# Patient Record
Sex: MALE | Race: WHITE | Employment: OTHER | URBAN - METROPOLITAN AREA
[De-identification: names, ages, dates, MRNs, and addresses within clinical notes are randomized per-mention and may not be internally consistent; named-entity substitution may affect disease eponyms.]

---

## 2017-05-16 ENCOUNTER — OFFICE VISIT (OUTPATIENT)
Dept: FAMILY MEDICINE | Facility: CLINIC | Age: 72
End: 2017-05-16
Payer: COMMERCIAL

## 2017-05-16 ENCOUNTER — RADIANT APPOINTMENT (OUTPATIENT)
Dept: GENERAL RADIOLOGY | Facility: CLINIC | Age: 72
End: 2017-05-16
Attending: INTERNAL MEDICINE
Payer: COMMERCIAL

## 2017-05-16 VITALS
DIASTOLIC BLOOD PRESSURE: 74 MMHG | OXYGEN SATURATION: 97 % | TEMPERATURE: 98.9 F | SYSTOLIC BLOOD PRESSURE: 122 MMHG | WEIGHT: 179 LBS | BODY MASS INDEX: 26.51 KG/M2 | HEIGHT: 69 IN | HEART RATE: 103 BPM

## 2017-05-16 DIAGNOSIS — J98.4 CALCIFIED GRANULOMA OF LUNG: ICD-10-CM

## 2017-05-16 DIAGNOSIS — Z23 NEED FOR PROPHYLACTIC VACCINATION AGAINST STREPTOCOCCUS PNEUMONIAE (PNEUMOCOCCUS): ICD-10-CM

## 2017-05-16 DIAGNOSIS — J32.0 LEFT MAXILLARY SINUSITIS: ICD-10-CM

## 2017-05-16 DIAGNOSIS — E78.5 HYPERLIPIDEMIA LDL GOAL <100: ICD-10-CM

## 2017-05-16 DIAGNOSIS — R91.8 PULMONARY NODULES: ICD-10-CM

## 2017-05-16 DIAGNOSIS — R05.3 PERSISTENT COUGH: ICD-10-CM

## 2017-05-16 DIAGNOSIS — J01.10 ACUTE FRONTAL SINUSITIS, UNSPECIFIED: Primary | ICD-10-CM

## 2017-05-16 PROBLEM — G47.33 OBSTRUCTIVE SLEEP APNEA: Status: ACTIVE | Noted: 2017-05-16

## 2017-05-16 PROBLEM — E11.9 TYPE 2 DIABETES MELLITUS WITHOUT COMPLICATION (H): Status: ACTIVE | Noted: 2017-05-16

## 2017-05-16 PROBLEM — E66.3 OVERWEIGHT (BMI 25.0-29.9): Status: ACTIVE | Noted: 2017-05-16

## 2017-05-16 LAB
ALBUMIN SERPL-MCNC: 3.9 G/DL (ref 3.4–5)
ALP SERPL-CCNC: 97 U/L (ref 40–150)
ALT SERPL W P-5'-P-CCNC: 33 U/L (ref 0–70)
ANION GAP SERPL CALCULATED.3IONS-SCNC: 12 MMOL/L (ref 3–14)
AST SERPL W P-5'-P-CCNC: 25 U/L (ref 0–45)
BILIRUB SERPL-MCNC: 0.6 MG/DL (ref 0.2–1.3)
BUN SERPL-MCNC: 17 MG/DL (ref 7–30)
CALCIUM SERPL-MCNC: 9.3 MG/DL (ref 8.5–10.1)
CHLORIDE SERPL-SCNC: 97 MMOL/L (ref 94–109)
CO2 SERPL-SCNC: 26 MMOL/L (ref 20–32)
CREAT SERPL-MCNC: 0.95 MG/DL (ref 0.66–1.25)
CREAT UR-MCNC: 162 MG/DL
DIFFERENTIAL METHOD BLD: NORMAL
ERYTHROCYTE [DISTWIDTH] IN BLOOD BY AUTOMATED COUNT: 12.3 % (ref 10–15)
GFR SERPL CREATININE-BSD FRML MDRD: 79 ML/MIN/1.7M2
GLUCOSE SERPL-MCNC: 325 MG/DL (ref 70–99)
HBA1C MFR BLD: 13.4 % (ref 4.3–6)
HCT VFR BLD AUTO: 48.2 % (ref 40–53)
HGB BLD-MCNC: 16.6 G/DL (ref 13.3–17.7)
LYMPHOCYTES # BLD AUTO: 1.5 10E9/L (ref 0.8–5.3)
LYMPHOCYTES NFR BLD AUTO: 16 %
MCH RBC QN AUTO: 31.3 PG (ref 26.5–33)
MCHC RBC AUTO-ENTMCNC: 34.4 G/DL (ref 31.5–36.5)
MCV RBC AUTO: 91 FL (ref 78–100)
MICROALBUMIN UR-MCNC: 1730 MG/L
MICROALBUMIN/CREAT UR: 1067.9 MG/G CR (ref 0–17)
MONOCYTES # BLD AUTO: 1.2 10E9/L (ref 0–1.3)
MONOCYTES NFR BLD AUTO: 13 %
NEUTROPHILS # BLD AUTO: 6.9 10E9/L (ref 1.6–8.3)
NEUTROPHILS NFR BLD AUTO: 71 %
PLATELET # BLD AUTO: 178 10E9/L (ref 150–450)
POTASSIUM SERPL-SCNC: 4.2 MMOL/L (ref 3.4–5.3)
PROT SERPL-MCNC: 8 G/DL (ref 6.8–8.8)
RBC # BLD AUTO: 5.31 10E12/L (ref 4.4–5.9)
SODIUM SERPL-SCNC: 135 MMOL/L (ref 133–144)
VARIANT LYMPHS BLD QL SMEAR: PRESENT
WBC # BLD AUTO: 9.6 10E9/L (ref 4–11)

## 2017-05-16 PROCEDURE — G0009 ADMIN PNEUMOCOCCAL VACCINE: HCPCS | Performed by: INTERNAL MEDICINE

## 2017-05-16 PROCEDURE — 82043 UR ALBUMIN QUANTITATIVE: CPT | Performed by: INTERNAL MEDICINE

## 2017-05-16 PROCEDURE — 84681 ASSAY OF C-PEPTIDE: CPT | Performed by: INTERNAL MEDICINE

## 2017-05-16 PROCEDURE — 85025 COMPLETE CBC W/AUTO DIFF WBC: CPT | Performed by: INTERNAL MEDICINE

## 2017-05-16 PROCEDURE — 70220 X-RAY EXAM OF SINUSES: CPT

## 2017-05-16 PROCEDURE — 80061 LIPID PANEL: CPT | Performed by: INTERNAL MEDICINE

## 2017-05-16 PROCEDURE — 90670 PCV13 VACCINE IM: CPT | Performed by: INTERNAL MEDICINE

## 2017-05-16 PROCEDURE — 83036 HEMOGLOBIN GLYCOSYLATED A1C: CPT | Performed by: INTERNAL MEDICINE

## 2017-05-16 PROCEDURE — 71020 XR CHEST 2 VW: CPT

## 2017-05-16 PROCEDURE — 86480 TB TEST CELL IMMUN MEASURE: CPT | Performed by: INTERNAL MEDICINE

## 2017-05-16 PROCEDURE — 99204 OFFICE O/P NEW MOD 45 MIN: CPT | Mod: 25 | Performed by: INTERNAL MEDICINE

## 2017-05-16 PROCEDURE — 36415 COLL VENOUS BLD VENIPUNCTURE: CPT | Performed by: INTERNAL MEDICINE

## 2017-05-16 PROCEDURE — 80053 COMPREHEN METABOLIC PANEL: CPT | Performed by: INTERNAL MEDICINE

## 2017-05-16 RX ORDER — METHYLPREDNISOLONE 4 MG
TABLET, DOSE PACK ORAL
Qty: 21 TABLET | Refills: 1 | Status: SHIPPED | OUTPATIENT
Start: 2017-05-16 | End: 2018-05-16

## 2017-05-16 RX ORDER — AZITHROMYCIN 250 MG/1
TABLET, FILM COATED ORAL
Qty: 6 TABLET | Refills: 1 | Status: SHIPPED | OUTPATIENT
Start: 2017-05-16 | End: 2017-06-19

## 2017-05-16 RX ORDER — GLIMEPIRIDE 4 MG/1
4 TABLET ORAL
Qty: 90 TABLET | Refills: 1 | Status: SHIPPED | OUTPATIENT
Start: 2017-05-16 | End: 2017-05-17

## 2017-05-16 NOTE — MR AVS SNAPSHOT
After Visit Summary   5/16/2017    John Ayala    MRN: [id number]           Patient Information     Date Of Birth          1945        Visit Information        Provider Department      5/16/2017 3:00 PM Vineet Franco MD [facility]        Today's Diagnoses     Acute bronchitis, unspecified organism    -  1    Controlled type 2 diabetes mellitus without complication, without long-term current use of insulin (H)        Calcified granuloma of lung (H)        Need for prophylactic vaccination against Streptococcus pneumoniae (pneumococcus)        Acute frontal sinusitis, unspecified        Left maxillary sinusitis          Care Instructions    This summary includes the important diagnoses, test, medications and other important parts of your medical history.  Below are a few good we sites you can use to learn more about these.     Www.[employer].Nexalogy : Up to date and easily searchable information on multiple topics.  Www.[employer].Nexalogy/Pharmacy/c_539084.asp : [facility] $4.99 medications  Www.Rkylin.gov : medication info, interactive tutorials, watch real surgeries online  Www.familydoctor.org : good info from the Academy of Family Physicians  Www.mayoPavlov Mediainic.com : good info from the [facility]  Www.cdc.gov : public health info, travel advisories, epidemics (H1N1)  Www.aap.org : children's health info, normal development, vaccinations  Www.health.[facility].[place].us : [place] dept of heatl, public health issues in [place], N1N1    Based on your medical history and these are the current health maintenance or preventive care services that you are due for (some may have been done at this visit:)  Health Maintenance Due   Topic Date Due     TETANUS IMMUNIZATION (SYSTEM ASSIGNED)  01/26/1965     ADVANCE DIRECTIVE PLANNING Q5 YRS (NO INBASKET)  01/26/1965     HEPATITIS C SCREENING  01/26/1965     LIPID SCREEN Q5 YR MALE (SYSTEM ASSIGNED)  01/26/1982     COLON CANCER SCREEN (SYSTEM  ASSIGNED)  01/26/1997     FALL RISK ASSESSMENT  01/26/2012     PNEUMOCOCCAL (1 of 2 - PCV13) 01/26/2012     AORTIC ANEURYSM SCREENING (SYSTEM ASSIGNED)  01/26/2012     =================================================================================  Normal Values   Blood pressure  <140/90 for most adults    <130/80 for some chronic diseases (ask your care team about yours)    BMI (body mass index)  18.5-25 kg/m2 (based on height and weight)     Thank you for visiting [facility]    Normal or non-critical lab and imaging results will be communicated to you by MyChart, letter or phone within 7 days.  If you do not hear from us within 10 days, please call the clinic. If you have a critical or abnormal lab result, we will notify you by phone as soon as possible.     If you have any questions regarding your visit please contact:     Team Comfort:   Clinic Hours Telephone Number   Dr. Vijay Lyod   [facility]hawa   7am-5pm  Monday - Friday [phone]  [facility]WS   Pharmacy 8am-8pm Monday-Thursday      8am-6pm Friday  9am-5pm Saturday-Sunday [phone]   Urgent Care 11am-8pm Monday-Friday        9am-5pm Saturday-Sunday [phone]     After hours, weekend or if you need to make an appointment with your primary provider please call [phone].   After Hours nurse advise: call [place] [facility]: [phone]    Medication Refills:  Call your pharmacy and they will forward the refill to us. Please allow 3 business days for your refills to be completed.    Use NewVoiceMedia (secure email communication and access to your chart) to send your primary care provider a message or make an appointment. Ask someone on your Team how to sign up for NewVoiceMedia. To log on to Kanga or for more information in SportsCstr please visit the website at [email or web address].  As of October 8, 2013, all password changes, disabled accounts, or ID changes in  "Pathgathert/MyHealth will be done by our Access Services Department.   If you need help with your account or password, call: [phone]. Clinic staff no longer has the ability to change passwords.     PATIENT INSTRUCTIONS:    1) To schedule CT of chest at [facility], please call [phone]        Follow-ups after your visit        Future tests that were ordered for you today     Open Future Orders        Priority Expected Expires Ordered    CT Chest w Contrast Routine  2018            Who to contact     If you have questions or need follow up information about today's clinic visit or your schedule please contact [facility] directly at [phone].  Normal or non-critical lab and imaging results will be communicated to you by MyChart, letter or phone within 4 business days after the clinic has received the results. If you do not hear from us within 7 days, please contact the clinic through MyChart or phone. If you have a critical or abnormal lab result, we will notify you by phone as soon as possible.  Submit refill requests through Differential or call your pharmacy and they will forward the refill request to us. Please allow 3 business days for your refill to be completed.          Additional Information About Your Visit        Zura!hart Information     Differential lets you send messages to your doctor, view your test results, renew your prescriptions, schedule appointments and more. To sign up, go to [email or web address] . Click on \"Log in\" on the left side of the screen, which will take you to the Welcome page. Then click on \"Sign up Now\" on the right side of the page.     You will be asked to enter the access code listed below, as well as some personal information. Please follow the directions to create your username and password.     Your access code is: [email or web address]  Expires: 2017  4:24 PM     Your access code will  in 90 days. If you need help or a new code, " "please call your [facility] clinic or [phone].        Care EveryWhere ID     This is your Care EveryWhere ID. This could be used by other organizations to access your [facility] medical records  [id number]        Your Vitals Were     Pulse Temperature Height Pulse Oximetry BMI (Body Mass Index)       103 98.9  F (37.2  C) (Oral) 5' 9\" (1.753 m) 97% 26.43 kg/m2        Blood Pressure from Last 3 Encounters:   05/16/17 122/74    Weight from Last 3 Encounters:   05/16/17 179 lb (81.2 kg)              We Performed the Following     Albumin Random Urine Quantitative     CBC with platelets and differential     Comprehensive metabolic panel (BMP + Alb, Alk Phos, ALT, AST, Total. Bili, TP)     Hemoglobin A1c     M Tuberculosis by Quantiferon     PNEUMOCOCCAL CONJ VACCINE 13 VALENT IM     XR Chest 2 Views     XR Sinus Complete G/E 3 Views          Today's Medication Changes          These changes are accurate as of: 5/16/17  4:24 PM.  If you have any questions, ask your nurse or doctor.               Start taking these medicines.        Dose/Directions    azithromycin 250 MG tablet   Commonly known as:  ZITHROMAX   Used for:  Acute bronchitis, unspecified organism, Acute frontal sinusitis, unspecified   Started by:  Vineet Franco MD        Two tablets first day, then one tablet daily for six days.   Quantity:  6 tablet   Refills:  1       methylPREDNISolone 4 MG tablet   Commonly known as:  MEDROL DOSEPAK   Used for:  Acute bronchitis, unspecified organism, Acute frontal sinusitis, unspecified   Started by:  Vineet Franco MD        Follow package instructions   Quantity:  21 tablet   Refills:  1            Where to get your medicines      [place] medications [place] [facility] [place] [address]  [address], [facility] [address]     Phone:  [phone]     azithromycin 250 MG tablet    methylPREDNISolone 4 MG tablet                Primary Care Provider    " None Specified       No primary provider on file.        Thank you!     Thank you for choosing [facility]  for your care. Our goal is always to provide you with excellent care. Hearing back from our patients is one way we can continue to improve our services. Please take a few minutes to complete the written survey that you may receive in the mail after your visit with us. Thank you!             Your Updated Medication List - Protect others around you: Learn how to safely use, store and throw away your medicines at www.disposemymeds.org.          This list is accurate as of: 5/16/17  4:24 PM.  Always use your most recent med list.                   Brand Name Dispense Instructions for use    aspirin 81 MG tablet      Take by mouth daily       azithromycin 250 MG tablet    ZITHROMAX    6 tablet    Two tablets first day, then one tablet daily for six days.       methylPREDNISolone 4 MG tablet    MEDROL DOSEPAK    21 tablet    Follow package instructions

## 2017-05-16 NOTE — PATIENT INSTRUCTIONS
This summary includes the important diagnoses, test, medications and other important parts of your medical history.  Below are a few good we sites you can use to learn more about these.     Www.[employer].org : Up to date and easily searchable information on multiple topics.  Www.[employer].org/Pharmacy/c_539084.asp : [facility] $4.99 medications  Www.medlineplus.gov : medication info, interactive tutorials, watch real surgeries online  Www.familydoctor.org : good info from the Academy of Family Physicians  Www.Everlaterinic.com : good info from the [facility]  Www.cdc.gov : public health info, travel advisories, epidemics (H1N1)  Www.aap.org : children's health info, normal development, vaccinations  Www.health.[facility].[place].us : [place] dept of heat, public health issues in [place], N1N1    Based on your medical history and these are the current health maintenance or preventive care services that you are due for (some may have been done at this visit:)  Health Maintenance Due   Topic Date Due     TETANUS IMMUNIZATION (SYSTEM ASSIGNED)  01/26/1965     ADVANCE DIRECTIVE PLANNING Q5 YRS (NO INBASKET)  01/26/1965     HEPATITIS C SCREENING  01/26/1965     LIPID SCREEN Q5 YR MALE (SYSTEM ASSIGNED)  01/26/1982     COLON CANCER SCREEN (SYSTEM ASSIGNED)  01/26/1997     FALL RISK ASSESSMENT  01/26/2012     PNEUMOCOCCAL (1 of 2 - PCV13) 01/26/2012     AORTIC ANEURYSM SCREENING (SYSTEM ASSIGNED)  01/26/2012     =================================================================================  Normal Values   Blood pressure  <140/90 for most adults    <130/80 for some chronic diseases (ask your care team about yours)    BMI (body mass index)  18.5-25 kg/m2 (based on height and weight)     Thank you for visiting [facility]    Normal or non-critical lab and imaging results will be communicated to you by MyChart, letter or phone within 7 days.  If you do not hear from us within 10 days, please call the clinic. If you  have a critical or abnormal lab result, we will notify you by phone as soon as possible.     If you have any questions regarding your visit please contact:     Team Comfort:   Clinic Hours Telephone Number   Dr. Vijay Clement  [facility]   7am-5pm  Monday - Friday [phone]  [facility]WS   Pharmacy 8am-8pm Monday-Thursday      8am-6pm Friday  9am-5pm Saturday-Sunday [phone]   Urgent Care 11am-8pm Monday-Friday        9am-5pm Saturday-Sunday [phone]     After hours, weekend or if you need to make an appointment with your primary provider please call [phone].   After Hours nurse advise: call [place] [facility]: [phone]    Medication Refills:  Call your pharmacy and they will forward the refill to us. Please allow 3 business days for your refills to be completed.    Use Massive Damage (secure email communication and access to your chart) to send your primary care provider a message or make an appointment. Ask someone on your Team how to sign up for Massive Damage. To log on to Yabbedoo or for more information in Bioscan please visit the website at [email or web address].  As of October 8, 2013, all password changes, disabled accounts, or ID changes in Massive Damage/MyHealth will be done by our Access Services Department.   If you need help with your account or password, call: [phone]. Clinic staff no longer has the ability to change passwords.     PATIENT INSTRUCTIONS:    1) To schedule CT of chest at [facility], please call [phone]

## 2017-05-16 NOTE — NURSING NOTE
"Chief Complaint   Patient presents with     other     lung concern     Cough       Initial /74 (BP Location: Left arm, Patient Position: Chair, Cuff Size: Adult Regular)  Pulse 103  Temp 98.9  F (37.2  C) (Oral)  Ht 5' 9\" (1.753 m)  Wt 179 lb (81.2 kg)  SpO2 97%  BMI 26.43 kg/m2 Estimated body mass index is 26.43 kg/(m^2) as calculated from the following:    Height as of this encounter: 5' 9\" (1.753 m).    Weight as of this encounter: 179 lb (81.2 kg).  Medication Reconciliation: complete     Torito Brown MA      "

## 2017-05-16 NOTE — PROGRESS NOTES
SUBJECTIVE:                                                    John Ayala is a 72 year old male who presents to clinic today as a new patient with the following issues    Acute Illness   Acute illness concerns: cough and lung concern  Onset: 1 week    Fever: no    Chills/Sweats: no    Headache (location?): no    Sinus Pressure:no    Conjunctivitis:  no    Ear Pain: no    Rhinorrhea: YES    Congestion: YES    Sore Throat: no     Cough: YES, yellow-tinged, but now clear.    Wheeze: YES    Decreased Appetite: YES    Nausea: no    Vomiting: no    Diarrhea:  YES    Dysuria/Freq.: no    Fatigue/Achiness: YES    Sick/Strep Exposure: no     Therapies Tried and outcome: none. Triggered by inhalation of dusts due to a defective CPAP filter. The patient is mainly worried by possible undiagnosed  lung condition since his CPAP filter became defective.      Diabetes Follow-up      Patient is checking blood sugars: not at all    Diabetic concerns: weight loss     Symptoms of hypoglycemia (low blood sugar): none     Paresthesias (numbness or burning in feet) or sores: No     Date of last diabetic eye exam: Due       Problem list and histories reviewed & adjusted, as indicated.  Additional history: as documented    BP Readings from Last 3 Encounters:   05/16/17 122/74    Wt Readings from Last 3 Encounters:   05/16/17 179 lb (81.2 kg)                      ROS:  C: NEGATIVE for fever, chills, change in weight  I: NEGATIVE for worrisome rashes, moles or lesions  E: NEGATIVE for vision changes or irritation  ENT/MOUTH: As above.  RESP:NEGATIVE for hemoptysis, pleurisy and wheezing  CV: NEGATIVE for chest pain, palpitations or peripheral edema  GI: NEGATIVE for nausea, abdominal pain, heartburn, or change in bowel habits  : NEGATIVE for frequency, dysuria, or hematuria  M: NEGATIVE for significant arthralgias or myalgia  N: NEGATIVE for weakness, dizziness or paresthesias  E: NEGATIVE for temperature intolerance, skin/hair  "changes  H: NEGATIVE for bleeding problems  P: NEGATIVE for changes in mood or affect    OBJECTIVE:                                                    /74 (BP Location: Left arm, Patient Position: Chair, Cuff Size: Adult Regular)  Pulse 103  Temp 98.9  F (37.2  C) (Oral)  Ht 5' 9\" (1.753 m)  Wt 179 lb (81.2 kg)  SpO2 97%  BMI 26.43 kg/m2  Body mass index is 26.43 kg/(m^2).  GENERAL: healthy, alert and no distress  EYES: Eyes grossly normal to inspection  HENT: normal cephalic/atraumatic, nasal mucosa edematous  and sinuses: not tender  NECK: no adenopathy  RESP: Harsh breath sounds bilaterally.  CV: regular rate and rhythm, normal S1 S2, no S3 or S4, no murmur, click or rub, no peripheral edema and peripheral pulses strong  MS: no gross musculoskeletal defects noted, no edema  SKIN: no suspicious lesions or rashes  NEURO: Normal strength and tone, mentation intact and speech normal  PSYCH: mentation appears normal, affect normal/bright    Diagnostic Test Results:  Results for orders placed or performed in visit on 05/16/17 (from the past 24 hour(s))   CBC with platelets and differential   Result Value Ref Range    WBC 9.6 4.0 - 11.0 10e9/L    RBC Count 5.31 4.4 - 5.9 10e12/L    Hemoglobin 16.6 13.3 - 17.7 g/dL    Hematocrit 48.2 40.0 - 53.0 %    MCV 91 78 - 100 fl    MCH 31.3 26.5 - 33.0 pg    MCHC 34.4 31.5 - 36.5 g/dL    RDW 12.3 10.0 - 15.0 %    Platelet Count 178 150 - 450 10e9/L    % Neutrophils 71.0 %    % Lymphocytes 16.0 %    % Monocytes 13.0 %    Absolute Neutrophil 6.9 1.6 - 8.3 10e9/L    Absolute Lymphocytes 1.5 0.8 - 5.3 10e9/L    Absolute Monocytes 1.2 0.0 - 1.3 10e9/L    Reactive Lymphs Present     Diff Method Automated Method    XR Chest 2 Views    Narrative    XR CHEST 2 VW   5/16/2017 3:06 PM     HISTORY: Unspecified acute lower respiratory infection    COMPARISON: None.      Impression    IMPRESSION: There are calcified right hilar lymph nodes. There is a  calcified granuloma in the " right upper lobe. There is a 11 mm nodule  in the lingula. This could represent a granuloma. Suggest chest CT for  further evaluation.    Lungs are otherwise clear without evidence for acute pneumonia.    YANDEL LUDWIG MD   Hemoglobin A1c   Result Value Ref Range    Hemoglobin A1C 13.4 (H) 4.3 - 6.0 %   Comprehensive metabolic panel (BMP + Alb, Alk Phos, ALT, AST, Total. Bili, TP)   Result Value Ref Range    Sodium 135 133 - 144 mmol/L    Potassium 4.2 3.4 - 5.3 mmol/L    Chloride 97 94 - 109 mmol/L    Carbon Dioxide 26 20 - 32 mmol/L    Anion Gap 12 3 - 14 mmol/L    Glucose 325 (H) 70 - 99 mg/dL    Urea Nitrogen 17 7 - 30 mg/dL    Creatinine 0.95 0.66 - 1.25 mg/dL    GFR Estimate 79 >60 mL/min/1.7m2    GFR Estimate If Black >90   GFR Calc   >60 mL/min/1.7m2    Calcium 9.3 8.5 - 10.1 mg/dL    Bilirubin Total 0.6 0.2 - 1.3 mg/dL    Albumin 3.9 3.4 - 5.0 g/dL    Protein Total 8.0 6.8 - 8.8 g/dL    Alkaline Phosphatase 97 40 - 150 U/L    ALT 33 0 - 70 U/L    AST 25 0 - 45 U/L          ASSESSMENT/PLAN:                                                        ICD-10-CM    1. Acute frontal sinusitis, unspecified J01.10 XR Sinus Complete G/E 3 Views     azithromycin (ZITHROMAX) 250 MG tablet     methylPREDNISolone (MEDROL DOSEPAK) 4 MG tablet   2. Left maxillary sinusitis J32.0 azithromycin (ZITHROMAX) 250 MG tablet     methylPREDNISolone (MEDROL DOSEPAK) 4 MG tablet   3. Persistent cough R05 XR Chest 2 Views     CBC with platelets and differential   4. Uncontrolled type 2 diabetes mellitus without complication, without long-term current use of insulin (H) E11.65 aspirin 81 MG tablet     Hemoglobin A1c     Comprehensive metabolic panel (BMP + Alb, Alk Phos, ALT, AST, Total. Bili, TP)     Albumin Random Urine Quantitative     DIABETES EDUCATOR REFERRAL     metFORMIN (GLUCOPHAGE) 1000 MG tablet     glimepiride (AMARYL) 4 MG tablet     ACE/ARB NOT PRESCRIBED, INTENTIONAL,     STATIN NOT PRESCRIBED,  INTENTIONAL,     C-peptide     insulin glargine (LANTUS) 100 UNIT/ML injection     insulin pen needle 31G X 6 MM     blood glucose monitoring (NO BRAND SPECIFIED) test strip     blood glucose (NO BRAND SPECIFIED) lancets standard     blood glucose (NO BRAND SPECIFIED) lancets standard   5. Calcified granuloma of lung (H) J84.10 XR Chest 2 Views     M Tuberculosis by Quantiferon     CT Chest w Contrast   6. Need for prophylactic vaccination against Streptococcus pneumoniae (pneumococcus) Z23 PNEUMOCOCCAL CONJ VACCINE 13 VALENT IM     CANCELED: PNEUMOCOCCAL VACCINE,ADULT,SQ OR IM   7. Hyperlipidemia LDL goal <100 E78.5 Lipid Profile with reflex to direct LDL       Follow up with Provider - 3 months for repeat HbA1c. Follow-up as needed if upper respiratory infections do not improve.     Vineet Franco MD  [facility]

## 2017-05-16 NOTE — NURSING NOTE
Screening Questionnaire for Adult Immunization    Are you sick today?   No   Do you have allergies to medications, food, a vaccine component or latex?   No   Have you ever had a serious reaction after receiving a vaccination?   No   Do you have a long-term health problem with heart disease, lung disease, asthma, kidney disease, metabolic disease (e.g. diabetes), anemia, or other blood disorder?   No   Do you have cancer, leukemia, HIV/AIDS, or any other immune system problem?   No   In the past 3 months, have you taken medications that affect  your immune system, such as prednisone, other steroids, or anticancer drugs; drugs for the treatment of rheumatoid arthritis, Crohn s disease, or psoriasis; or have you had radiation treatments?   No   Have you had a seizure, or a brain or other nervous system problem?   No   During the past year, have you received a transfusion of blood or blood     products, or been given immune (gamma) globulin or antiviral drug?   No   For women: Are you pregnant or is there a chance you could become        pregnant during the next month?   No   Have you received any vaccinations in the past 4 weeks?   No     Immunization questionnaire answers were all negative.      MNVFC doesn't apply on this patient     Screening performed by Torito Brown on 5/16/2017 at 4:39 PM.

## 2017-05-17 ENCOUNTER — TELEPHONE (OUTPATIENT)
Dept: FAMILY MEDICINE | Facility: CLINIC | Age: 72
End: 2017-05-17

## 2017-05-17 LAB
CHOLEST SERPL-MCNC: 202 MG/DL
HDLC SERPL-MCNC: 48 MG/DL
LDLC SERPL CALC-MCNC: 133 MG/DL
NONHDLC SERPL-MCNC: 154 MG/DL
TRIGL SERPL-MCNC: 106 MG/DL

## 2017-05-17 NOTE — TELEPHONE ENCOUNTER
Returned call to patient - he wanted to clarify the names and doses of medications ordered so he can check with pricing and his insurance company for coverage.    He will call back with pharmacy selection - see below note in red.

## 2017-05-17 NOTE — TELEPHONE ENCOUNTER
Notes Recorded by Vineet Franco MD on 5/16/2017 at 7:08 PM  Patient has uncontrolled diabetes, with hemoglobin A1c is abnormal at 13.4%. Goal is less than 8%.   Recommend starting the following medications:  1) Metformin 1000 mg twice a day.  2) Glimepiride 4 mg every morning.  3) Lantus 15 units every morning.  Start taking both oral medications for diabetes first. Recommend appointment with diabetic educator prior to starting Lantus.  Repeat hemoglobin A1c in 3 months. Rx for Metformin and Glimepiride sent to [facility] pharmacy. Do not use Lantus yet until he sees a diabetic educator.    (Call patient)

## 2017-05-17 NOTE — TELEPHONE ENCOUNTER
This writer attempted to contact John on 05/17/17    Reason for call results and left message to return call.    When patient calls back, please contact clinic RN team. If no one available, document that pt called and route to care team. routine priority.        Rolando Coronado, RN

## 2017-05-17 NOTE — TELEPHONE ENCOUNTER
Patient was informed of test results and recommendations from provider.   Patient verbalized understanding of all information given.    He would like the Rx's resent to another pharmacy but is not sure yet which ones as he is researching a few.  Pt transferred to diabetic ed scheduling line.    When patient calls back, please document which pharmacy he would like Metformin, Lantus and Glimepiride resent to. Route to Team Comfort.        Rolando Coronado RN

## 2017-05-18 LAB
C PEPTIDE SERPL-MCNC: 1.6 NG/ML (ref 0.9–6.9)
M TB TUBERC IFN-G BLD QL: NEGATIVE
M TB TUBERC IFN-G/MITOGEN IGNF BLD: 0.05 IU/ML

## 2017-05-18 NOTE — TELEPHONE ENCOUNTER
Reason for Call:  Other returning call    Detailed comments: Please call patient regarding medications. He does not know what pharmacy he is using, wants someone to call and clarify the correct spelling and dosage of all medications so he can check the price.     Phone Number Patient can be reached at: Home number on file [phone] (home)    Best Time: Any    Can we leave a detailed message on this number? YES    Call taken on 5/18/2017 at 1:45 PM by Magaly Joel

## 2017-05-19 NOTE — TELEPHONE ENCOUNTER
Returned call to patient to get status update - patient states she needs to get some final prices from local pharmacies and is planning to call us back this afternoon to advise where to resend the medications listed below.    When patient calls back, please document which pharmacy he would like Metformin, Lantus and Glimepiride resent to. Route to Team Comfort.     Rolando Coronado RN

## 2017-05-22 RX ORDER — GLIMEPIRIDE 4 MG/1
4 TABLET ORAL
Qty: 90 TABLET | Refills: 1 | Status: SHIPPED | OUTPATIENT
Start: 2017-05-22 | End: 2017-06-21

## 2017-05-24 ENCOUNTER — ALLIED HEALTH/NURSE VISIT (OUTPATIENT)
Dept: EDUCATION SERVICES | Facility: CLINIC | Age: 72
End: 2017-05-24
Payer: COMMERCIAL

## 2017-05-24 VITALS — WEIGHT: 174.2 LBS | BODY MASS INDEX: 25.72 KG/M2

## 2017-05-24 PROCEDURE — G0108 DIAB MANAGE TRN  PER INDIV: HCPCS

## 2017-05-24 PROCEDURE — 99207 ZZC NO CHARGE LOS: CPT

## 2017-05-24 NOTE — PROGRESS NOTES
Diabetes Self Management Training: Insulin Start    John Ayala presents today for initiation of insulin related to Type 2 diabetes.  He is accompanied by self.  Patient's diabetes management related comments/concerns:  To review blood sugars and medications. Patient's emotional response to diabetes: expresses readiness to learn. Patient would like this visit to be focused around the following diabetes-related behaviors and goals: to learn how to use Lantus.     ASSESSMENT:  Patient Problem List and Family Medical History reviewed for relevant medical history, current medical status, and diabetes risk factors.    Current Diabetes Management per Patient:  Taking diabetes medications?   yes:     Diabetes Medication(s)     Biguanides Sig    metFORMIN (GLUCOPHAGE) 1000 MG tablet Take 1 tablet (1,000 mg) by mouth 2 times daily (with meals)    Insulin Sig    insulin glargine (LANTUS SOLOSTAR) 100 UNIT/ML injection Inject 15 Units Subcutaneous At Bedtime    insulin glargine (LANTUS) 100 UNIT/ML injection Inject 15 Units Subcutaneous At Bedtime    Sulfonylureas Sig    glimepiride (AMARYL) 4 MG tablet Take 1 tablet (4 mg) by mouth every morning (before breakfast)        Ran out of oral medications for 9-10 months ago and was taking extras from his  sister, but then these ran out.  He reports previously being on both Metformin and amaryl.     *Abbreviated insulin dose documentation key: Insulin Trade Name (ojsvqybrx-[id number]-bedtime) - i.e. Humalog 5-5-5-0 (Humalog 5 units at breakfast, 5 units at lunch, and 5 units at dinner).    Past Diabetes Education: Yes    Patient glucose self monitoring as follows: rarely.   BG meter: Freestyle Lite meter  (says it is not covered & encouraged to check with pharmacy which brands are covered).  BG results:  Checked three times on  195-300+, but with  strips.   Was not checking when he was not taking medications.     BG values are: Not in goal  Patient's most  "recent   Lab Results   Component Value Date    A1C 13.4 05/16/2017    is not meeting goal of <8.0    Nutrition:  Patient currently eats 2 meals per day    Not hungry until 2pm  Lunch 2-3pm   Dinner at  7pm     Physical Activity:    Not assessed this visit    Diabetes Risk Factors:  age over 45 years    Diabetes Complications:  Acute Complications: At risk for hypoglycemia? yes  Symptoms of low blood sugar? shaking  Frequency of hypoglycemia: not anytime recently.  Has happened in his lifetime.   Patient carries a carbohydrate source with them regularly: No, but will start now.   Type of carbohydrate: glucose tablets    Vitals:  Wt 174 lb 3.2 oz (79 kg)  BMI 25.72 kg/m2  Estimated body mass index is 25.72 kg/(m^2) as calculated from the following:    Height as of 5/16/17: 5' 9\" (1.753 m).    Weight as of this encounter: 174 lb 3.2 oz (79 kg).   Last 3 BP:   BP Readings from Last 3 Encounters:   05/16/17 122/74     History   Smoking Status     Former Smoker     Types: Cigarettes     Start date: 1/1/1997   Smokeless Tobacco     Not on file     Labs:  Lab Results   Component Value Date    A1C 13.4 05/16/2017     Lab Results   Component Value Date     05/16/2017     Lab Results   Component Value Date     05/16/2017     HDL Cholesterol   Date Value Ref Range Status   05/16/2017 48 >39 mg/dL Final   ]  GFR Estimate   Date Value Ref Range Status   05/16/2017 79 >60 mL/min/1.7m2 Final     Comment:     Non  GFR Calc     GFR Estimate If Black   Date Value Ref Range Status   05/16/2017 >90   GFR Calc   >60 mL/min/1.7m2 Final     Lab Results   Component Value Date    CR 0.95 05/16/2017     No results found for: MICROALBUMIN    Socio/Economic History:    Support system: not assessed    Health Beliefs and Attitudes:   Patient Activation Measure Survey Score:  CAROLINA Score (Last Two) 5/16/2017   CAROLINA Raw Score 30   Activation Score 56   CAROLINA Level 3     Stage of Change: PREPARATION (Decided " to change - considering how)    Diabetes knowledge and skills assessment:   Patient is knowledgeable in diabetes management concepts related to: Monitoring, Taking Medication and Problem Solving  Patient needs further education on the following diabetes management concepts: Healthy Eating, Being Active, Reducing Risks and Healthy Coping  Barriers to Learning Assessment: No Barriers identified  Based on learning assessment above, most appropriate setting for further diabetes education would be: Individual setting.    INTERVENTION:  Patient desires to start over and get his blood sugars under control.  He will  the insulin and oral medications this week.  Called pharmacy, but they did not have any of patient's insurance information so were unable to tell if Lantus is the preferred brand.  Plan is for patient to bring in his insurance information and  the oral medications and begin these (if Lantus is preferred he will pick this up too and begin).  If another basal insulin is preferred or cheaper, patient is to call in to the diabetes ed triage line so that a new order can be requested from the MD for a different basal insulin.     Reviewed hypoglycemia in detail and how to treat it.  Discussed BG being 100mg/dL or higher when driving and before bed.  Patient is going to buy glucose tabs and keep these with at all times.  He will also make himself eat a breakfast so that he does not have a 19  Hour gap in between dinner and breakfast.  Encouraged patient to also eat a snack at night to minimize risk for hypoglycemia.   If numbers come into target and patient feels like he is eating too much, discussed how this means we can likely decrease the insulin.      Patient is to call in immediately if he has any low blood sugars.  Gave information for the [facility] so that he can get a hold of a triage nurse or urgent care as well as the DM ed triage line.   Otherwise instructed to send in a picture of  his BG log on Tuesday 5/30 and again on Friday 6/2.  Plan is to make a follow up appointment in a few weeks to review additional AADE 7 topics.   Instructed to check BGs 3-4 times daily.      Insulin administration technique taught using a Pen for Lantus - 15 units at bedtime per MD orders  (0.2units/kg). Patient verbalized understanding and was able to perform an accurate return demonstration of administration technique.     Education provided today on:  AADE Self-Care Behaviors:  Healthy Eating: consistency in amount, composition, and timing of food intake  Monitoring: log and interpret results, individual blood glucose targets and frequency of monitoring  Taking Medication: action of prescribed medication, drawing up, administering and storing injectable diabetes medications, proper site selection and rotation for injections, side effects of prescribed medications and when to take medications  Problem Solving: low blood glucose - causes, signs/symptoms, treatment and prevention and carrying a carbohydrate source at all times    Opportunities for ongoing education and support in diabetes-self management were discussed.  Pt verbalized understanding of concepts discussed and recommendations provided today.       Education Materials Provided:  BG Log Sheet and how to treat a low blood sugar   and Vehicle Services: Just the Facts - Medical Conditions and your 's License  [place] Department [employer] Public [employer]: Insulin-Treated Diabetes Mellitus Report    PLAN:  See Patient Instructions for co-developed, patient-stated behavior change goals.  AVS printed and provided to patient today.  Patient to inform the [place] Department [employer] Public [employer] of insulin use.    FOLLOW-UP:  Follow-up appointment via e-mail on 5/30  (E-mail consent form signed: [email or web address])  Education topics to cover at the next diabetes education visit(s): review carb counting, activity, risk reducation  Chart routed to referring  provider.    Ongoing plan for education and support: Follow-up with primary care provider    Mignon Ron RD, JONI   Diabetes Education    Time Spent: 90 minutes  Encounter Type: Individual    Any diabetes medication dose changes were made via the CDE Protocol and Collaborative Practice Agreement with the patient's primary care provider. A copy of this encounter was shared with the provider.

## 2017-05-24 NOTE — PATIENT INSTRUCTIONS
"  1.  Ask your pharmacy what glucometer is preferred   2.  The pharmacy doesn't have your insurance information so they aren't able to see how much is covered.   Ask if  Basaglar (or any other basal insulins) are less expensive than Lantus or what is preferred.     3.  Add in breakfast that has carbohydrate in it.  When you eat at 2pm & 7pm only you go 19 hours without any food, which could increase your risk for lows.   4.  glucose tabs at [facility] to keep with you at all times     Taking Insulin:    1. Take Lantus - 15 units at bedtime.     - Do a 2 unit \"prime\" before each injection, be sure a stream of insulin comes out of the needle before you give your injection. After you inject, hold the needle under the skin to the count of 10 to be sure all of the insulin goes in.     - Rotate injection sites, keeping at least 1 inch apart from last injection site and 2 inches away from belly button or surgical scars.    2. Pen - Use a new pen needle for each injection. Always remove pen needle from the insulin pen after use and do not store insulin pens with the needle on the pen.     3. Store insulin you are not using in the refrigerator (do not freeze). Take new insulin out of the refrigerator a few hours prior to use to bring to room temperature.     4. Once opened Lantus can be kept at room temperature for 28 days after opened.. Do not use the opened insulin after this time has passed, even if there is still medicine inside.     5. Always carry your blood sugar meter and a sugar source like glucose tablets with you in case of a low blood sugar. Treat a low blood sugar (less than 70) with 15 grams of carbohydrate (1 carb choice). Wait 15 minutes, recheck blood sugar. If blood sugar is still below 70, repeat the treatment.    6. Wear Medical ID or carry a wallet card stating you have Diabetes.    7. Call your doctor or diabetes educator if you begin having low blood sugars or if you have questions or concerns. "     8. Complete and mail in the form to inform the [place] Department [employer] Public [employer] that you have started taking insulin.    9. E-mail in an update on 17.  If you have a low blood sugar update right away.  Take a picture of your blood sugar log and attach to the email.   Put your full name and  on the e-mail so we can find you in the computer.       [place] [facility] for the [facility]:  For Your Diabetes Education or Nutrition Appointments Call:  [phone]   For Nutrition RelateQuestions Call:   Phone: [phone]  E-mail: [email or web address]  Fax: [phone]   If you need a medication refill please contact your pharmacy. Please allow 3 business days for your refills to be completed.

## 2017-05-24 NOTE — Clinical Note
Hi Dr. Franco,   (just an FYI) John came in for his DM ed appointment today.  He will begin the medications when he picks them up today.   Plan is for him to e-mail a picture of his BG log week and again in the weeks after to see how it is going.  We will set up more appointments for additional ed.  Thank you for the referral!  Albertina Ron RD, LD  Diabetes Education

## 2017-05-24 NOTE — MR AVS SNAPSHOT
"              After Visit Summary   5/24/2017    John Ayala    MRN: [id number]           Patient Information     Date Of Birth          1945        Visit Information        Provider Department      5/24/2017 11:30 AM  DIABETIC ED RESOURCE [facility] Instructions      1.  Ask your pharmacy what glucometer is preferred   2.  The pharmacy doesn't have your insurance information so they aren't able to see how much is covered.   Ask if  Basaglar (or any other basal insulins) are less expensive than Lantus or what is preferred.     3.  Add in breakfast that has carbohydrate in it.  When you eat at 2pm & 7pm only you go 19 hours without any food, which could increase your risk for lows.   4.  glucose tabs at [facility] to keep with you at all times     Taking Insulin:    1. Take Lantus - 15 units at bedtime.     - Do a 2 unit \"prime\" before each injection, be sure a stream of insulin comes out of the needle before you give your injection. After you inject, hold the needle under the skin to the count of 10 to be sure all of the insulin goes in.     - Rotate injection sites, keeping at least 1 inch apart from last injection site and 2 inches away from belly button or surgical scars.    2. Pen - Use a new pen needle for each injection. Always remove pen needle from the insulin pen after use and do not store insulin pens with the needle on the pen.     3. Store insulin you are not using in the refrigerator (do not freeze). Take new insulin out of the refrigerator a few hours prior to use to bring to room temperature.     4. Once opened Lantus can be kept at room temperature for 28 days after opened.. Do not use the opened insulin after this time has passed, even if there is still medicine inside.     5. Always carry your blood sugar meter and a sugar source like glucose tablets with you in case of a low blood sugar. Treat a low blood sugar (less than 70) with 15 grams of " carbohydrate (1 carb choice). Wait 15 minutes, recheck blood sugar. If blood sugar is still below 70, repeat the treatment.    6. Wear Medical ID or carry a wallet card stating you have Diabetes.    7. Call your doctor or diabetes educator if you begin having low blood sugars or if you have questions or concerns.     8. Complete and mail in the form to inform the [place] Department [employer] Public [place] that you have started taking insulin.    9. E-mail in an update on 17.  If you have a low blood sugar update right away.  Take a picture of your blood sugar log and attach to the email.   Put your full name and  on the e-mail so we can find you in the computer.       [place] [facility] for the [facility]:  For Your Diabetes Education or Nutrition Appointments Call:  [phone]   For Nutrition RelateQuestions Call:   Phone: [phone]  E-mail: [email or web address]  Fax: [phone]   If you need a medication refill please contact your pharmacy. Please allow 3 business days for your refills to be completed.                     Follow-ups after your visit        Your next 10 appointments already scheduled     May 25, 2017  1:15 PM CDT   CT CHEST W CONTRAST with MGCT1   [facility] ([facility])    [id number] [place] [id number] [phone]           Please bring any scans or X-rays taken at other hospitals, if similar tests were done. Also bring a list of your medicines, including vitamins, minerals and over-the-counter drugs. It is safest to leave personal items at home.  Be sure to tell your doctor:   If you have any allergies.   If there s any chance you are pregnant.   If you are breastfeeding.   If you have any special needs.  You will have contrast for this exam. To prepare:   Do not eat or drink for 2 hours before your exam. If you need to take medicine, you may take it with small sips of water. (We  may ask you to take liquid medicine as well.)   The day before your exam, drink extra fluids at least six 8-ounce glasses (unless your doctor tells you to restrict your fluids).  Patients over 70 or patients with diabetes or kidney problems:   If you haven t had a blood test (creatinine test) within the last 30 days, go to your clinic or Diagnostic Imaging Department for this test.  If you have diabetes:   If your kidney function is normal, continue taking your metformin (Avandamet, Glucophage, Glucovance, Metaglip) on the day of your exam.   If your kidney function is abnormal, wait 48 hours before restarting this medicine.  Please wear loose clothing, such as a sweat suit or jogging clothes. Avoid snaps, zippers and other metal. We may ask you to undress and put on a hospital gown.  If you have any questions, please call the Imaging Department where you will have your exam.            Jun 05, 2017  2:00 PM CDT   Diabetic Education with [facility] DIABETIC ED RESOURCE   [facility] ([facility])    [address] [place] [id number] [phone]              Who to contact     If you have questions or need follow up information about today's clinic visit or your schedule please contact [facility] directly at [phone].  Normal or non-critical lab and imaging results will be communicated to you by [employer], letter or phone within 4 business days after the clinic has received the results. If you do not hear from us within 7 days, please contact the clinic through [employer] or phone. If you have a critical or abnormal lab result, we will notify you by phone as soon as possible.  Submit refill requests through [employer] or call your pharmacy and they will forward the refill request to us. Please allow 3 business days for your refill to be completed.          Additional Information About Your Visit        [employer] Information     [employer] lets you send  "messages to your doctor, view your test results, renew your prescriptions, schedule appointments and more. To sign up, go to [email or web address] . Click on \"Log in\" on the left side of the screen, which will take you to the Welcome page. Then click on \"Sign up Now\" on the right side of the page.     You will be asked to enter the access code listed below, as well as some personal information. Please follow the directions to create your username and password.     Your access code is: [email or web address]  Expires: 2017  4:24 PM     Your access code will  in 90 days. If you need help or a new code, please call your [facility] clinic or [phone].        Care EveryWhere ID     This is your Care EveryWhere ID. This could be used by other organizations to access your [facility] medical records  [id number]        Your Vitals Were     BMI (Body Mass Index)                   25.72 kg/m2            Blood Pressure from Last 3 Encounters:   17 122/74    Weight from Last 3 Encounters:   17 174 lb 3.2 oz (79 kg)   17 179 lb (81.2 kg)              Today, you had the following     No orders found for display       Primary Care Provider    None Specified       No primary provider on file.        Thank you!     Thank you for choosing [facility]  for your care. Our goal is always to provide you with excellent care. Hearing back from our patients is one way we can continue to improve our services. Please take a few minutes to complete the written survey that you may receive in the mail after your visit with us. Thank you!             Your Updated Medication List - Protect others around you: Learn how to safely use, store and throw away your medicines at www.disposemymeds.org.          This list is accurate as of: 17 12:40 PM.  Always use your most recent med list.                   Brand Name Dispense Instructions for use    ACE/ARB NOT PRESCRIBED (INTENTIONAL)      Please choose " reason not prescribed, below       aspirin 81 MG tablet      Take by mouth daily       azithromycin 250 MG tablet    ZITHROMAX    6 tablet    Two tablets first day, then one tablet daily for six days.       * blood glucose lancets standard    no brand specified    1 Box    Use to test blood sugar two times daily or as directed.       * blood glucose lancets standard    no brand specified    1 Box    Use to test blood sugar two times daily or as directed.       blood glucose monitoring test strip    no brand specified    100 strip    Use to test blood sugars two times daily or as directed       glimepiride 4 MG tablet    AMARYL    90 tablet    Take 1 tablet (4 mg) by mouth every morning (before breakfast)       * insulin glargine 100 UNIT/ML injection    LANTUS    6 mL    Inject 15 Units Subcutaneous At Bedtime       * insulin glargine 100 UNIT/ML injection    LANTUS SOLOSTAR    6 mL    Inject 15 Units Subcutaneous At Bedtime       insulin pen needle 31G X 6 MM     100 each    Use once daily with Lantus.       metFORMIN 1000 MG tablet    GLUCOPHAGE    180 tablet    Take 1 tablet (1,000 mg) by mouth 2 times daily (with meals)       methylPREDNISolone 4 MG tablet    MEDROL DOSEPAK    21 tablet    Follow package instructions       STATIN NOT PRESCRIBED (INTENTIONAL)      Please choose reason not prescribed, below       * Notice:  This list has 4 medication(s) that are the same as other medications prescribed for you. Read the directions carefully, and ask your doctor or other care provider to review them with you.

## 2017-05-25 ENCOUNTER — RADIANT APPOINTMENT (OUTPATIENT)
Dept: CT IMAGING | Facility: CLINIC | Age: 72
End: 2017-05-25
Attending: INTERNAL MEDICINE
Payer: COMMERCIAL

## 2017-05-25 ENCOUNTER — TELEPHONE (OUTPATIENT)
Dept: FAMILY MEDICINE | Facility: CLINIC | Age: 72
End: 2017-05-25

## 2017-05-25 DIAGNOSIS — J98.4 CALCIFIED GRANULOMA OF LUNG: ICD-10-CM

## 2017-05-25 LAB — RADIOLOGIST FLAGS: NORMAL

## 2017-05-25 PROCEDURE — 71260 CT THORAX DX C+: CPT | Performed by: RADIOLOGY

## 2017-05-25 RX ORDER — INSULIN GLARGINE 100 [IU]/ML
15 INJECTION, SOLUTION SUBCUTANEOUS AT BEDTIME
Qty: 9 ML | Refills: 5 | Status: SHIPPED | OUTPATIENT
Start: 2017-05-25 | End: 2017-08-04

## 2017-05-25 RX ORDER — IOPAMIDOL 755 MG/ML
85 INJECTION, SOLUTION INTRAVASCULAR ONCE
Status: COMPLETED | OUTPATIENT
Start: 2017-05-25 | End: 2017-05-25

## 2017-05-25 RX ADMIN — IOPAMIDOL 85 ML: 755 INJECTION, SOLUTION INTRAVASCULAR at 13:45

## 2017-05-25 NOTE — TELEPHONE ENCOUNTER
Faxed message from [employer]:    Patient would like to get a cheaper alternative than insulin glargine (LANTUS SOLOSTAR) 100 UNIT/ML injection.    Pharmacy is stating that BASAGLAR is a little cheaper.    Please send new Rx if appropriate.      [facility]

## 2017-05-29 PROBLEM — R91.8 PULMONARY NODULES: Status: ACTIVE | Noted: 2017-05-29

## 2017-05-30 ENCOUNTER — TELEPHONE (OUTPATIENT)
Dept: FAMILY MEDICINE | Facility: CLINIC | Age: 72
End: 2017-05-30

## 2017-05-30 NOTE — TELEPHONE ENCOUNTER
1) His weight loss is from his uncontrolled diabetes mellitus.   2) In his case, improving his diabetes mellitus will also improve his fatty liver.   3) Since his liver enzymes remain normal, no risk of liver cirrhosis (fatty liver is second most common indication of liver transplantation if liver enzymes, ALT and AST levels, are 8-10x above normal range).   4) Recommend monitoring his liver enzymes every 3 months.  5) Information about fatty liver will be mailed to the patient.  6) Consult hepatology only if his liver enzymes are abnormal.

## 2017-05-30 NOTE — TELEPHONE ENCOUNTER
Notes Recorded by Vineet Franco MD on 5/29/2017 at 3:39 PM  CT of chest shows the following findings:  1) Multiple lung nodules, largest measuring 1.5 x 1.0 cm at left side of lung. Repeat CT of chest in 3 months to monitor nodules. If left unmonitored, nodules may lead to malignancy.  2) Infection at both lower lobes, consistent with his persistent cough (if not resolved, use another round of Azithromycin).  3) Fatty liver (indicidentally found). Fatty liver is due to uncontrolled diabetes and obesity (improving both will lead to resolution of fatty liver). Despite fatty liver, no liver damage yet since hepatic panel during visit is normal.    (Call patient)      This writer attempted to contact John on 05/30/17    Reason for call Results and left message to return call.    When patient calls back, please contact clinic RN team. If no one available, document that pt called and route to care team. routine priority.        Yahaira Márquez RN

## 2017-05-30 NOTE — TELEPHONE ENCOUNTER
Patient notified of the message below.   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  Patient states that there might have been exposure to asbestos.    Routing to provider.   Yahaira Márquez RN

## 2017-05-30 NOTE — TELEPHONE ENCOUNTER
Noted. But remind patient that his CT of chest didn't show mesothelioma, which is associated with asbestos exposure.

## 2017-05-30 NOTE — TELEPHONE ENCOUNTER
Patient called back and the results below were discussed. The patient wants provider to know that the cough is clearing up. It is no longer continuous. He would like more explanation on the fatty liver since he has lost 30 lbs in the last 8-10 months do to decreased appetite.     Cyndie Gomez RN, [facility]

## 2017-06-01 ENCOUNTER — TELEPHONE (OUTPATIENT)
Dept: EDUCATION SERVICES | Facility: CLINIC | Age: 72
End: 2017-06-01

## 2017-06-01 NOTE — TELEPHONE ENCOUNTER
Called out to John Ayala 10:10 AM 6/1/2017 to review blood sugars. Voicemail left requesting return phone call to update diabetes education, [phone] or requested an email update as patient signed the consent to communicate this way as well.    Mignon Ron RD, LD  Diabetes Education

## 2017-06-01 NOTE — TELEPHONE ENCOUNTER
Sea called back and states he is doing well.  Blood sugar is down.  Activity is increased.  He has added breakfast to his regimen.  Marika Magaña RN,CDE

## 2017-06-12 ENCOUNTER — TRANSFERRED RECORDS (OUTPATIENT)
Dept: HEALTH INFORMATION MANAGEMENT | Facility: CLINIC | Age: 72
End: 2017-06-12

## 2017-06-12 LAB — HEMOCCULT STL QL IA: NEGATIVE

## 2017-06-15 ENCOUNTER — VIRTUAL VISIT (OUTPATIENT)
Dept: EDUCATION SERVICES | Facility: CLINIC | Age: 72
End: 2017-06-15

## 2017-06-15 ENCOUNTER — TELEPHONE (OUTPATIENT)
Dept: EDUCATION SERVICES | Facility: CLINIC | Age: 72
End: 2017-06-15

## 2017-06-15 NOTE — TELEPHONE ENCOUNTER
Diabetes Follow-up    Subjective/Objective:    John Ayala sent in blood glucose log for review. Last date of communication was: Called out to John Ayala 11:12 AM 6/15/2017 to review blood sugars.  Is checking BG 3 /day on average.  FBG in the morning have been around 115-130mg/dL.  He will take a picture of his BG log and e-mail in a picture next week.  Is working more exercise into his routine at the [facility].  No questions or concerns.  He reports everything is going very well.     Diabetes is being managed with Lifestyle (diet/activity)  Taking diabetes medications?   yes:     Diabetes Medication(s)     Biguanides Sig    metFORMIN (GLUCOPHAGE) 1000 MG tablet Take 1 tablet (1,000 mg) by mouth 2 times daily (with meals)    Insulin Sig    insulin glargine (BASAGLAR KWIKPEN) 100 UNIT/ML injection Inject 15 Units Subcutaneous At Bedtime    insulin glargine (LANTUS) 100 UNIT/ML injection Inject 15 Units Subcutaneous At Bedtime    Sulfonylureas Sig    glimepiride (AMARYL) 4 MG tablet Take 1 tablet (4 mg) by mouth every morning (before breakfast)        Plan/Response:  Patient to take a picture of his BG log and e-mail it in next week.     Mignon Ron RD, LD   Diabetes Education

## 2017-06-15 NOTE — PROGRESS NOTES
Please see telephone encounter dated 06/15/2017 for details.    Mignon Ron RD, LD   Diabetes Education

## 2017-06-15 NOTE — MR AVS SNAPSHOT
"              After Visit Summary   6/15/2017    John Ayala    MRN: [id number]           Patient Information     Date Of Birth          1945        Visit Information        Provider Department      6/15/2017 9:30 AM  DIABETIC ED RESOURCE [facility]        Today's Diagnoses     Uncontrolled type 2 diabetes mellitus without complication, without long-term current use of insulin (H)    -  1       Follow-ups after your visit        Who to contact     If you have questions or need follow up information about today's clinic visit or your schedule please contact [facility] directly at [phone].  Normal or non-critical lab and imaging results will be communicated to you by MashMe[employer]TVhart, letter or phone within 4 business days after the clinic has received the results. If you do not hear from us within 7 days, please contact the clinic through MashMe[employer]TVhart or phone. If you have a critical or abnormal lab result, we will notify you by phone as soon as possible.  Submit refill requests through [employer] or call your pharmacy and they will forward the refill request to us. Please allow 3 business days for your refill to be completed.          Additional Information About Your Visit        MyChart Information     [employer] lets you send messages to your doctor, view your test results, renew your prescriptions, schedule appointments and more. To sign up, go to [email or web address] . Click on \"Log in\" on the left side of the screen, which will take you to the Welcome page. Then click on \"Sign up Now\" on the right side of the page.     You will be asked to enter the access code listed below, as well as some personal information. Please follow the directions to create your username and password.     Your access code is: [email or web address]  Expires: 2017  4:24 PM     Your access code will  in 90 days. If you need help or a new code, please call your [facility] or [phone].      "   Care EveryWhere ID     This is your Care EveryWhere ID. This could be used by other organizations to access your [place] medical records  [id number]         Blood Pressure from Last 3 Encounters:   05/16/17 122/74    Weight from Last 3 Encounters:   05/24/17 174 lb 3.2 oz (79 kg)   05/16/17 179 lb (81.2 kg)              Today, you had the following     No orders found for display       Primary Care Provider Office Phone #    [facility] [phone]       No address on file        Thank you!     Thank you for choosing [facility]  for your care. Our goal is always to provide you with excellent care. Hearing back from our patients is one way we can continue to improve our services. Please take a few minutes to complete the written survey that you may receive in the mail after your visit with us. Thank you!             Your Updated Medication List - Protect others around you: Learn how to safely use, store and throw away your medicines at www.disposemymeds.org.          This list is accurate as of: 6/15/17 11:20 AM.  Always use your most recent med list.                   Brand Name Dispense Instructions for use    ACE/ARB NOT PRESCRIBED (INTENTIONAL)      Please choose reason not prescribed, below       aspirin 81 MG tablet      Take by mouth daily       azithromycin 250 MG tablet    ZITHROMAX    6 tablet    Two tablets first day, then one tablet daily for six days.       * blood glucose lancets standard    no brand specified    1 Box    Use to test blood sugar two times daily or as directed.       * blood glucose lancets standard    no brand specified    1 Box    Use to test blood sugar two times daily or as directed.       blood glucose monitoring test strip    no brand specified    100 strip    Use to test blood sugars two times daily or as directed       glimepiride 4 MG tablet    AMARYL    90 tablet    Take 1 tablet (4 mg) by mouth every morning (before breakfast)       *  insulin glargine 100 UNIT/ML injection    LANTUS    6 mL    Inject 15 Units Subcutaneous At Bedtime       * insulin glargine 100 UNIT/ML injection     9 mL    Inject 15 Units Subcutaneous At Bedtime       insulin pen needle 31G X 6 MM     100 each    Use once daily with Lantus.       metFORMIN 1000 MG tablet    GLUCOPHAGE    180 tablet    Take 1 tablet (1,000 mg) by mouth 2 times daily (with meals)       methylPREDNISolone 4 MG tablet    MEDROL DOSEPAK    21 tablet    Follow package instructions       STATIN NOT PRESCRIBED (INTENTIONAL)      Please choose reason not prescribed, below       * Notice:  This list has 4 medication(s) that are the same as other medications prescribed for you. Read the directions carefully, and ask your doctor or other care provider to review them with you.

## 2017-06-19 ENCOUNTER — TELEPHONE (OUTPATIENT)
Dept: FAMILY MEDICINE | Facility: CLINIC | Age: 72
End: 2017-06-19

## 2017-06-19 ENCOUNTER — TELEPHONE (OUTPATIENT)
Dept: EDUCATION SERVICES | Facility: CLINIC | Age: 72
End: 2017-06-19

## 2017-06-19 ENCOUNTER — VIRTUAL VISIT (OUTPATIENT)
Dept: EDUCATION SERVICES | Facility: CLINIC | Age: 72
End: 2017-06-19

## 2017-06-19 DIAGNOSIS — J01.10 ACUTE FRONTAL SINUSITIS, UNSPECIFIED: ICD-10-CM

## 2017-06-19 DIAGNOSIS — J32.0 LEFT MAXILLARY SINUSITIS: ICD-10-CM

## 2017-06-19 RX ORDER — AZITHROMYCIN 250 MG/1
TABLET, FILM COATED ORAL
Qty: 11 TABLET | Refills: 1 | Status: SHIPPED | OUTPATIENT
Start: 2017-06-19 | End: 2017-06-28

## 2017-06-19 NOTE — TELEPHONE ENCOUNTER
Diabetes Follow-up    Subjective/Objective:    John MAO Ayala sent in blood glucose log for review. Last date of communication was: 06/15/2017.    Diabetes is being managed with Lifestyle (diet/activity)  Taking diabetes medications?   yes:     Diabetes Medication(s)     Biguanides Sig    metFORMIN (GLUCOPHAGE) 1000 MG tablet Take 1 tablet (1,000 mg) by mouth 2 times daily (with meals)    Insulin Sig    insulin glargine (BASAGLAR KWIKPEN) 100 UNIT/ML injection Inject 15 Units Subcutaneous At Bedtime    insulin glargine (LANTUS) 100 UNIT/ML injection Inject 15 Units Subcutaneous At Bedtime    Sulfonylureas Sig    glimepiride (AMARYL) 4 MG tablet Take 1 tablet (4 mg) by mouth every morning (before breakfast)          BG/Food Log:       Assessment (2 weeks - since 6/3)    Fasting blood glucose: 93% in target (13/14).  After dinner glucose: 91% in target (10/11)  Bedtime glucose: 100% in target.    Plan/Response:  Blood sugars significantly improved; will route to MD.  Will request that patient send in an update again in 3-4 weeks to see if there are any changes.      E-mail to patient:   Alonso Lopez,   Thanks for the updated blood sugars and keeping such a detailed log.  It is very helpful.  Your numbers look excellent! The majority of your numbers are back in target to bring your A1c back down to an 8 or lower.   Keep up the great work.  I will send your log on to Dr. Franco as well.   Which insulin ended up being the preferred brand (Lantus or Basaglar?).    Do you think you could e-mail in numbers again in 3-4 weeks?       Please call or e-mail at anytime if you have questions.  If you ever have a blood sugar below 70mg/dL, please update us right away.   Thanks,   Albertina     Any diabetes medication dose changes were made via the CDE Protocol and Collaborative Practice Agreement with the patient's primary care provider. A copy of this encounter was shared with the provider.

## 2017-06-19 NOTE — PROGRESS NOTES
Please see telephone encounter dated 06/19/2017 for details.    Mignon Ron RD, LD   Diabetes Education

## 2017-06-19 NOTE — MR AVS SNAPSHOT
"              After Visit Summary   2017    John Ayala    MRN: [id number]           Patient Information     Date Of Birth          1945        Visit Information        Provider Department      2017 9:30 AM [place] DIABETES ED RESOURCE [facility]        Today's Diagnoses     Uncontrolled type 2 diabetes mellitus without complication, without long-term current use of insulin (H)    -  1       Follow-ups after your visit        Who to contact     If you have questions or need follow up information about today's clinic visit or your schedule please contact [facility] directly at [phone].  Normal or non-critical lab and imaging results will be communicated to you by CoworkingONhart, letter or phone within 4 business days after the clinic has received the results. If you do not hear from us within 7 days, please contact the clinic through CoworkingONhart or phone. If you have a critical or abnormal lab result, we will notify you by phone as soon as possible.  Submit refill requests through [employer] or call your pharmacy and they will forward the refill request to us. Please allow 3 business days for your refill to be completed.          Additional Information About Your Visit        MyChart Information     [employer] lets you send messages to your doctor, view your test results, renew your prescriptions, schedule appointments and more. To sign up, go to [email or web address] . Click on \"Log in\" on the left side of the screen, which will take you to the Welcome page. Then click on \"Sign up Now\" on the right side of the page.     You will be asked to enter the access code listed below, as well as some personal information. Please follow the directions to create your username and password.     Your access code is: [email or web address]  Expires: 2017  4:24 PM     Your access code will  in 90 days. If you need help or a new code, please call your [facility] or [phone].        Care " EveryWhere ID     This is your Care EveryWhere ID. This could be used by other organizations to access your [place] medical records  [id number]         Blood Pressure from Last 3 Encounters:   05/16/17 122/74    Weight from Last 3 Encounters:   05/24/17 79 kg (174 lb 3.2 oz)   05/16/17 81.2 kg (179 lb)              Today, you had the following     No orders found for display       Primary Care Provider Office Phone #    [facility] [phone]       No address on file        Thank you!     Thank you for choosing [facility]  for your care. Our goal is always to provide you with excellent care. Hearing back from our patients is one way we can continue to improve our services. Please take a few minutes to complete the written survey that you may receive in the mail after your visit with us. Thank you!             Your Updated Medication List - Protect others around you: Learn how to safely use, store and throw away your medicines at www.disposemymeds.org.          This list is accurate as of: 6/19/17 12:05 PM.  Always use your most recent med list.                   Brand Name Dispense Instructions for use    ACE/ARB NOT PRESCRIBED (INTENTIONAL)      Please choose reason not prescribed, below       aspirin 81 MG tablet      Take by mouth daily       azithromycin 250 MG tablet    ZITHROMAX    6 tablet    Two tablets first day, then one tablet daily for six days.       * blood glucose lancets standard    no brand specified    1 Box    Use to test blood sugar two times daily or as directed.       * blood glucose lancets standard    no brand specified    1 Box    Use to test blood sugar two times daily or as directed.       blood glucose monitoring test strip    no brand specified    100 strip    Use to test blood sugars two times daily or as directed       glimepiride 4 MG tablet    AMARYL    90 tablet    Take 1 tablet (4 mg) by mouth every morning (before breakfast)       * insulin  glargine 100 UNIT/ML injection    LANTUS    6 mL    Inject 15 Units Subcutaneous At Bedtime       * insulin glargine 100 UNIT/ML injection     9 mL    Inject 15 Units Subcutaneous At Bedtime       insulin pen needle 31G X 6 MM     100 each    Use once daily with Lantus.       metFORMIN 1000 MG tablet    GLUCOPHAGE    180 tablet    Take 1 tablet (1,000 mg) by mouth 2 times daily (with meals)       methylPREDNISolone 4 MG tablet    MEDROL DOSEPAK    21 tablet    Follow package instructions       STATIN NOT PRESCRIBED (INTENTIONAL)      Please choose reason not prescribed, below       * Notice:  This list has 4 medication(s) that are the same as other medications prescribed for you. Read the directions carefully, and ask your doctor or other care provider to review them with you.

## 2017-06-19 NOTE — TELEPHONE ENCOUNTER
E-mail from patient:   Albertina, I ended up with the Basaglar, least expensive. Thanks for the encouragement. I does help. Will send in numbers when I fill the next log. Have a good week.  Sea Ayala    Educator will remove lantus from prescription list.      Mignon Ron RD, LD   Diabetes Education

## 2017-06-19 NOTE — TELEPHONE ENCOUNTER
Reason for Call:  Medication or medication refill:    Do you use a [facility]?  Name of the pharmacy and phone number for the current request:  [facility] [address] [place] [address]    Name of the medication requested: Antibiotic    Other request: patient was taking antibiotic that thinks he did not take it long enough because the cough is back and thinks he still has the infection. He is also asking to take it for a longer time than the last time.    Can we leave a detailed message on this number? YES    Phone number patient can be reached at: Home number on file [phone] (home)    Best Time: any    Call taken on 6/19/2017 at 9:44 AM by Elke oHang

## 2017-06-19 NOTE — TELEPHONE ENCOUNTER
The patient was prescribed the following medication for sinus infection    azithromycin (ZITHROMAX) 250 MG tablet 6 tablet 1 5/16/2017  --   Sig: Two tablets first day, then one tablet daily for six days.     methylPREDNISolone (MEDROL DOSEPAK) 4 MG tablet 21 tablet 1 5/16/2017  --   Sig: Follow package instructions     Patient states that he has been coughing up a lot of phlegm.  Patient states that symptoms started to improve on antibiotics.  Patient thinks that if he just took an antibiotic longer he would have recovered.  After completing the antibiotics symptoms started to return.  Patient denies any sinus pressure, runny nose, or fever.      Patient was unaware of refills on medication.  RN called pharmacy and refills are still available.  Patient is wondering if he needs a longer antibiotic.    Routing to provider. Please advise if patient should  refills or if a new antibiotic is recommended.    Yahaira Márquez RN

## 2017-06-20 NOTE — TELEPHONE ENCOUNTER
Patient contacted and informed of the below per provider documentation. Patient verbalizes understanding.     Khadijah Jimenez RN

## 2017-06-21 ENCOUNTER — TELEPHONE (OUTPATIENT)
Dept: EDUCATION SERVICES | Facility: CLINIC | Age: 72
End: 2017-06-21

## 2017-06-21 ENCOUNTER — VIRTUAL VISIT (OUTPATIENT)
Dept: EDUCATION SERVICES | Facility: CLINIC | Age: 72
End: 2017-06-21

## 2017-06-21 RX ORDER — GLIMEPIRIDE 2 MG/1
2 TABLET ORAL
Qty: 90 TABLET | Refills: 1 | Status: SHIPPED | OUTPATIENT
Start: 2017-06-21 | End: 2017-06-28

## 2017-06-21 NOTE — TELEPHONE ENCOUNTER
Alonso Franco    Please note if you approve of this plan or indicate an alternate plan.   Most of John' blood sugars are in target now and he started running low last evening (65mg/dL)     Decrease basaglar from 15 untis to 13 units  And/or would you prefer to decrease or discontinue the Glimepiride?  (he is on 4mg daily).        Thanks!  Albertina Ron RD, LD   Diabetes Education

## 2017-06-21 NOTE — TELEPHONE ENCOUNTER
Diabetes Follow-up    Subjective/Objective:    John Ayala sent in blood glucose log for review. Last date of communication was: 06/19/2017.    E-mail from patient:   Albertina,  Last night I got a reading of 68. I took a second reading to confirm and got a reading of 65. A took a snack (normally don't) and got into range at 107 before going to sleep. You asked me to report if I got below 70. Thanks,     Sea Ayala    Plan/Response:  See BG results from telephone encounter 6/19/17.  Patient is taking 15 units of Basaglar once daily.   Is also on Glimeperide 4mg once daily.  Recommend decreasing insulin by 2 units (13%) due to hypoglycemia tonight for safety.   Consider decreasing or discontinuing Glimepiride and will route to provider for approval.    Patient to adjust Basaglar tonight for safety.     E-mail back to patient:   Alonso Osei,   Thanks you for updating right away.   Do you remember anything different about yesterday?  Did you have a lighter lunch or were you more active during the day?     For now, to reduce the chance of low blood sugars please decrease your Basaglar by 2 units (from 15 down to 13).    I have a call out to Dr. Franco, but I probably won t hear back today.   I will update you if he wants any other changes.      Please update again if you have any further low blood sugars.    Please send in another picture of your blood sugar log next week as well if you can.   Have a great night!   Thanks,   Albertina Ron RD, LD   Diabetes Education        Any diabetes medication dose changes were made via the CDE Protocol and Collaborative Practice Agreement with the patient's primary care provider. A copy of this encounter was shared with the provider.

## 2017-06-21 NOTE — PROGRESS NOTES
Please see telephone encounter dated 06/21/2017 for details.    Mignon Ron RD, LD   Diabetes Education

## 2017-06-21 NOTE — MR AVS SNAPSHOT
"              After Visit Summary   2017    John Ayala    MRN: [id number]           Patient Information     Date Of Birth          1945        Visit Information        Provider Department      2017 8:30 AM BK DIABETIC ED RESOURCE [facility]        Today's Diagnoses     Uncontrolled type 2 diabetes mellitus without complication, without long-term current use of insulin (H)    -  1       Follow-ups after your visit        Who to contact     If you have questions or need follow up information about today's clinic visit or your schedule please contact [facility] directly at [phone].  Normal or non-critical lab and imaging results will be communicated to you by [employer], letter or phone within 4 business days after the clinic has received the results. If you do not hear from us within 7 days, please contact the clinic through [employer] or phone. If you have a critical or abnormal lab result, we will notify you by phone as soon as possible.  Submit refill requests through [employer] or call your pharmacy and they will forward the refill request to us. Please allow 3 business days for your refill to be completed.          Additional Information About Your Visit        MyChart Information     [employer] lets you send messages to your doctor, view your test results, renew your prescriptions, schedule appointments and more. To sign up, go to [email or web address] . Click on \"Log in\" on the left side of the screen, which will take you to the Welcome page. Then click on \"Sign up Now\" on the right side of the page.     You will be asked to enter the access code listed below, as well as some personal information. Please follow the directions to create your username and password.     Your access code is: [email or web address]  Expires: 2017  4:24 PM     Your access code will  in 90 days. If you need help or a new code, please call your [facility] or " [phone].        Care EveryWhere ID     This is your Care EveryWhere ID. This could be used by other organizations to access your [facility] medical records  [id number]         Blood Pressure from Last 3 Encounters:   05/16/17 122/74    Weight from Last 3 Encounters:   05/24/17 174 lb 3.2 oz (79 kg)   05/16/17 179 lb (81.2 kg)              Today, you had the following     No orders found for display       Primary Care Provider Office Phone #    [facility] [phone]       No address on file        Equal Access to Services     [facility]: Hadii aad ku hadasho Soomaali, waaxda luqadaha, qaybta kaalmada adeegyada, fernando woodall . So [facility] [phone].    ATENCIÓN: Si habla español, tiene a choi disposición servicios gratuitos de asistencia lingüística. Llame al [phone].    We comply with applicable federal civil rights laws and [place] laws. We do not discriminate on the basis of race, color, national origin, age, disability sex, sexual orientation or gender identity.            Thank you!     Thank you for choosing [facility]  for your care. Our goal is always to provide you with excellent care. Hearing back from our patients is one way we can continue to improve our services. Please take a few minutes to complete the written survey that you may receive in the mail after your visit with us. Thank you!             Your Updated Medication List - Protect others around you: Learn how to safely use, store and throw away your medicines at www.disposemymeds.org.          This list is accurate as of: 6/21/17 12:04 PM.  Always use your most recent med list.                   Brand Name Dispense Instructions for use Diagnosis    ACE/ARB NOT PRESCRIBED (INTENTIONAL)      Please choose reason not prescribed, below    Uncontrolled type 2 diabetes mellitus without complication, without long-term current use of insulin (H)       aspirin 81 MG tablet       Take by mouth daily    Uncontrolled type 2 diabetes mellitus without complication, without long-term current use of insulin (H)       azithromycin 250 MG tablet    ZITHROMAX    11 tablet    Two tablets first day, then one tablet daily for nine days.    Acute frontal sinusitis, unspecified, Left maxillary sinusitis       * blood glucose lancets standard    no brand specified    1 Box    Use to test blood sugar two times daily or as directed.    Uncontrolled type 2 diabetes mellitus without complication, without long-term current use of insulin (H)       * blood glucose lancets standard    no brand specified    1 Box    Use to test blood sugar two times daily or as directed.    Uncontrolled type 2 diabetes mellitus without complication, without long-term current use of insulin (H)       blood glucose monitoring test strip    no brand specified    100 strip    Use to test blood sugars two times daily or as directed    Uncontrolled type 2 diabetes mellitus without complication, without long-term current use of insulin (H)       glimepiride 4 MG tablet    AMARYL    90 tablet    Take 1 tablet (4 mg) by mouth every morning (before breakfast)    Uncontrolled type 2 diabetes mellitus without complication, without long-term current use of insulin (H)       insulin glargine 100 UNIT/ML injection     9 mL    Inject 15 Units Subcutaneous At Bedtime    Uncontrolled type 2 diabetes mellitus without complication, without long-term current use of insulin (H)       insulin pen needle 31G X 6 MM     100 each    Use once daily with Lantus.    Uncontrolled type 2 diabetes mellitus without complication, without long-term current use of insulin (H)       metFORMIN 1000 MG tablet    GLUCOPHAGE    180 tablet    Take 1 tablet (1,000 mg) by mouth 2 times daily (with meals)    Uncontrolled type 2 diabetes mellitus without complication, without long-term current use of insulin (H)       methylPREDNISolone 4 MG tablet    MEDROL DOSEPAK    21  tablet    Follow package instructions    Acute frontal sinusitis, unspecified, Left maxillary sinusitis       STATIN NOT PRESCRIBED (INTENTIONAL)      Please choose reason not prescribed, below    Uncontrolled type 2 diabetes mellitus without complication, without long-term current use of insulin (H)       * Notice:  This list has 2 medication(s) that are the same as other medications prescribed for you. Read the directions carefully, and ask your doctor or other care provider to review them with you.

## 2017-06-21 NOTE — TELEPHONE ENCOUNTER
E-mail to patient:   Alonso Osei,   You can disregard my first email.   Dr. Franco replied - he said to keep the Basaglar at 15 units and reduce the Glimepiride to 2mg once daily (down from 4mg once daily).   This medication can also cause low blood sugars and we may be able to eventually get rid of it.   You can cut the pill in half to use up the rest of the supply and I will update your next prescription to the smaller dose.    Please update again in a week with a picture of your blood sugars or at any time with questions!   Thank you,   Albertina

## 2017-06-28 ENCOUNTER — VIRTUAL VISIT (OUTPATIENT)
Dept: EDUCATION SERVICES | Facility: CLINIC | Age: 72
End: 2017-06-28

## 2017-06-28 ENCOUNTER — TELEPHONE (OUTPATIENT)
Dept: EDUCATION SERVICES | Facility: CLINIC | Age: 72
End: 2017-06-28

## 2017-06-28 ENCOUNTER — TELEPHONE (OUTPATIENT)
Dept: FAMILY MEDICINE | Facility: CLINIC | Age: 72
End: 2017-06-28

## 2017-06-28 DIAGNOSIS — J01.11 ACUTE RECURRENT FRONTAL SINUSITIS: Primary | ICD-10-CM

## 2017-06-28 DIAGNOSIS — J32.1 FRONTAL SINUSITIS, UNSPECIFIED CHRONICITY: Primary | ICD-10-CM

## 2017-06-28 RX ORDER — AZITHROMYCIN 500 MG/1
500 TABLET, FILM COATED ORAL DAILY
Qty: 3 TABLET | Refills: 5 | Status: SHIPPED | OUTPATIENT
Start: 2017-06-28 | End: 2018-05-16

## 2017-06-28 NOTE — TELEPHONE ENCOUNTER
Patient prescribed the following medication for a sinus infection:  azithromycin (ZITHROMAX) 250 MG tablet 11 tablet 1 6/19/2017  No   Sig: Two tablets first day, then one tablet daily for nine days.         RN called patient and informed him that there was a refill attached to the Zithromax.  Patient states that symptoms are improving.  Cough is starting to reduce.  Patient doesn't want to have to pay for 11 tablets.  Patient is hoping the doctor could send a new script for 3 days.    Routing to provider. Please advise.  Yahaira Márquez RN

## 2017-06-28 NOTE — MR AVS SNAPSHOT
"              After Visit Summary   2017    John Ayala    MRN: [id number]           Patient Information     Date Of Birth          1945        Visit Information        Provider Department      2017 12:30 PM BK DIABETIC ED RESOURCE [facility]        Today's Diagnoses     Uncontrolled type 2 diabetes mellitus without complication, without long-term current use of insulin (H)    -  1       Follow-ups after your visit        Who to contact     If you have questions or need follow up information about today's clinic visit or your schedule please contact [facility] directly at [phone].  Normal or non-critical lab and imaging results will be communicated to you by [employer], letter or phone within 4 business days after the clinic has received the results. If you do not hear from us within 7 days, please contact the clinic through [employer] or phone. If you have a critical or abnormal lab result, we will notify you by phone as soon as possible.  Submit refill requests through [employer] or call your pharmacy and they will forward the refill request to us. Please allow 3 business days for your refill to be completed.          Additional Information About Your Visit        MyChart Information     [employer] lets you send messages to your doctor, view your test results, renew your prescriptions, schedule appointments and more. To sign up, go to [email or web address] . Click on \"Log in\" on the left side of the screen, which will take you to the Welcome page. Then click on \"Sign up Now\" on the right side of the page.     You will be asked to enter the access code listed below, as well as some personal information. Please follow the directions to create your username and password.     Your access code is: [email or web address]  Expires: 2017  4:24 PM     Your access code will  in 90 days. If you need help or a new code, please call your [facility] or " [phone].        Care EveryWhere ID     This is your Care EveryWhere ID. This could be used by other organizations to access your [facility] medical records  [id number]         Blood Pressure from Last 3 Encounters:   05/16/17 122/74    Weight from Last 3 Encounters:   05/24/17 174 lb 3.2 oz (79 kg)   05/16/17 179 lb (81.2 kg)              Today, you had the following     No orders found for display         Today's Medication Changes          These changes are accurate as of: 6/28/17  1:46 PM.  If you have any questions, ask your nurse or doctor.               These medicines have changed or have updated prescriptions.        Dose/Directions    azithromycin 500 MG tablet   Commonly known as:  ZITHROMAX   This may have changed:    - medication strength  - how much to take  - how to take this  - when to take this  - additional instructions   Used for:  Acute recurrent frontal sinusitis   Changed by:  Vineet Franco MD        Dose:  500 mg   Take 1 tablet (500 mg) by mouth daily   Quantity:  3 tablet   Refills:  5            Where to get your medicines      These medications were sent to [facility] [address] [place] [id number]     Phone:  [phone]     azithromycin 500 MG tablet                Primary Care Provider Office Phone #    [facility] [phone]       No address on file        Equal Access to Services     [facility]: Abi Pratt, waaxda luqadaha, qaybta kaalmada adeegatiya, fernando diehl. So [facility] [phone].    ATENCIÓN: Si habla español, tiene a choi disposición servicios gratuitos de asistencia lingüística. Llame al [phone].    We comply with applicable federal civil rights laws and [place] laws. We do not discriminate on the basis of race, color, national origin, age, disability sex, sexual orientation or gender identity.            Thank you!      Thank you for choosing [facility]  for your care. Our goal is always to provide you with excellent care. Hearing back from our patients is one way we can continue to improve our services. Please take a few minutes to complete the written survey that you may receive in the mail after your visit with us. Thank you!             Your Updated Medication List - Protect others around you: Learn how to safely use, store and throw away your medicines at www.disposemymeds.org.          This list is accurate as of: 6/28/17  1:46 PM.  Always use your most recent med list.                   Brand Name Dispense Instructions for use Diagnosis    ACE/ARB NOT PRESCRIBED (INTENTIONAL)      Please choose reason not prescribed, below    Uncontrolled type 2 diabetes mellitus without complication, without long-term current use of insulin (H)       aspirin 81 MG tablet      Take by mouth daily    Uncontrolled type 2 diabetes mellitus without complication, without long-term current use of insulin (H)       azithromycin 500 MG tablet    ZITHROMAX    3 tablet    Take 1 tablet (500 mg) by mouth daily    Acute recurrent frontal sinusitis       * blood glucose lancets standard    no brand specified    1 Box    Use to test blood sugar two times daily or as directed.    Uncontrolled type 2 diabetes mellitus without complication, without long-term current use of insulin (H)       * blood glucose lancets standard    no brand specified    1 Box    Use to test blood sugar two times daily or as directed.    Uncontrolled type 2 diabetes mellitus without complication, without long-term current use of insulin (H)       blood glucose monitoring test strip    no brand specified    100 strip    Use to test blood sugars two times daily or as directed    Uncontrolled type 2 diabetes mellitus without complication, without long-term current use of insulin (H)       glimepiride 2 MG tablet    AMARYL    90 tablet    Take 1 tablet (2 mg) by  mouth every morning (before breakfast)    Uncontrolled type 2 diabetes mellitus without complication, without long-term current use of insulin (H)       insulin glargine 100 UNIT/ML injection     9 mL    Inject 15 Units Subcutaneous At Bedtime    Uncontrolled type 2 diabetes mellitus without complication, without long-term current use of insulin (H)       insulin pen needle 31G X 6 MM     100 each    Use once daily with Lantus.    Uncontrolled type 2 diabetes mellitus without complication, without long-term current use of insulin (H)       metFORMIN 1000 MG tablet    GLUCOPHAGE    180 tablet    Take 1 tablet (1,000 mg) by mouth 2 times daily (with meals)    Uncontrolled type 2 diabetes mellitus without complication, without long-term current use of insulin (H)       methylPREDNISolone 4 MG tablet    MEDROL DOSEPAK    21 tablet    Follow package instructions    Acute frontal sinusitis, unspecified, Left maxillary sinusitis       STATIN NOT PRESCRIBED (INTENTIONAL)      Please choose reason not prescribed, below    Uncontrolled type 2 diabetes mellitus without complication, without long-term current use of insulin (H)       * Notice:  This list has 2 medication(s) that are the same as other medications prescribed for you. Read the directions carefully, and ask your doctor or other care provider to review them with you.

## 2017-06-28 NOTE — TELEPHONE ENCOUNTER
Reason for Call:  Other prescription    Detailed comments: can you extend antibiotic Erthromycin for more time it is just starting to resolve but want more time  Sending High Priority message    Phone Number Patient can be reached at: Home number on file [phone] (home)    Best Time: any    Can we leave a detailed message on this number? YES    Call taken on 6/28/2017 at 11:39 AM by Krissy Hassan

## 2017-06-28 NOTE — TELEPHONE ENCOUNTER
Hi John Claire is doing well with checking his blood sugar.  Majority are in goal (see below) and even at 74mg/dL before lunch, so I was thinking possibly discontinue the 2mg of Glimeperide or cut it down to 1mg?     Thanks!   Albertina Ron RD, LD   Diabetes Education

## 2017-06-28 NOTE — PROGRESS NOTES
Please see telephone encounter dated 06/28/2017 for details.    Mignon Ron RD, LD   Diabetes Education

## 2017-06-28 NOTE — TELEPHONE ENCOUNTER
Diabetes Follow-up    Subjective/Objective:  John Ayala sent in blood glucose log for review. Last date of communication was: 06/21/2017.  Diabetes is being managed with Lifestyle (diet/activity)  Taking diabetes medications?   yes:     Diabetes Medication(s)     Biguanides Sig    metFORMIN (GLUCOPHAGE) 1000 MG tablet Take 1 tablet (1,000 mg) by mouth 2 times daily (with meals)    Insulin Sig    insulin glargine (BASAGLAR KWIKPEN) 100 UNIT/ML injection Inject 15 Units Subcutaneous At Bedtime    Sulfonylureas Sig    glimepiride (AMARYL) 2 MG tablet Take 1 tablet (2 mg) by mouth every morning (before breakfast)        BG/Food Log:  E-mail from patient:    Albertina, This is what you requested.  Thanks,      Assessment: since 6/22 with last med change  Fasting blood glucose: 83% in target (5/6)   Before lunch glucose: 100% in target (2/2)  After lunch glucose: 100% in target (2/2)   Before dinner glucose: 100% in target (2/2)  After dinner glucose: 100% in target (1/1)   Bedtime glucose: 75% in target (3/4)     Plan/Response:  Consider dropping out Glimepiride or decreasing to 1mg; will rout e to provider for approval.     E-mail sent to patient  1:48pm  Alonso Lopez,   Yes that is perfect.  Keep up the great work; your blood sugars are % in target!!  I have a call out to Dr. Franco to see if we should decrease/adjust medications more.    I will email you back when I hear back.    Have a great night and thank you,   Albertina      E-mail sent to patient  2:50pm  Dr. Joan Leavitt replied right away.   Please discontinue the 2mg of glimepiride and we can see where your blood sugars go.  They are looking great!    Please send an updated picture of the log again at the end of next week (~7/6) if you can.     Thanks and have a great week.   Albertina Ron RD, LD   Diabetes Education    Any diabetes medication dose changes were made via the CDE Protocol and Collaborative Practice Agreement with the  patient's primary care provider. A copy of this encounter was shared with the provider.

## 2017-07-07 ENCOUNTER — VIRTUAL VISIT (OUTPATIENT)
Dept: EDUCATION SERVICES | Facility: CLINIC | Age: 72
End: 2017-07-07

## 2017-07-07 ENCOUNTER — TELEPHONE (OUTPATIENT)
Dept: EDUCATION SERVICES | Facility: CLINIC | Age: 72
End: 2017-07-07

## 2017-07-07 NOTE — TELEPHONE ENCOUNTER
Diabetes Follow-up    Subjective/Objective:    John Ayala sent in blood glucose log for review. Last date of communication was: 06/28/2017.    Diabetes is being managed with Lifestyle (diet/activity)  Taking diabetes medications?   yes:     Diabetes Medication(s)     Biguanides Sig    metFORMIN (GLUCOPHAGE) 1000 MG tablet Take 1 tablet (1,000 mg) by mouth 2 times daily (with meals)    Insulin Sig    insulin glargine (BASAGLAR KWIKPEN) 100 UNIT/ML injection Inject 15 Units Subcutaneous At Bedtime        BGFood Log:       Assessment:  (a1c8)  Fasting blood glucose: 80% in target.  After breakfast glucose: 100% in target.  Before lunch glucose: 50% in target (0/1)   After lunch glucose: 100% in target.  Before dinner glucose: 50% in target.  After dinner glucose: 50% in target.  Bedtime glucose: 0% in target.    Plan/Response:  Could consider splitting dose of Basaglar, but will try further titration first.  Overall blood sugars are close to target.  Recommend to increase by 2 units and will route to MD for approval.     Mignon Ron RD, LD   Diabetes Education      Any diabetes medication dose changes were made via the CDE Protocol and Collaborative Practice Agreement with the patient's primary care provider. A copy of this encounter was shared with the provider.

## 2017-07-07 NOTE — TELEPHONE ENCOUNTER
Hi John Claire' blood sugars are getting close to goal.  He fully dropped out the Glimepiride. Please note if you approve of this plan or indicate an alternate plan.   Increase Basaglar by 2 units from 15 to 17 units daily.     Thanks!  Albertina Ron RD, LD   Diabetes Education

## 2017-07-07 NOTE — MR AVS SNAPSHOT
"              After Visit Summary   2017    John Ayala    MRN: [id number]           Patient Information     Date Of Birth          1945        Visit Information        Provider Department      2017 10:30 AM [place] DI[place] ED RESOURCE [facility]        Today's Diagnoses     Uncontrolled type 2 diabetes mellitus without complication, without long-term current use of insulin (H)    -  1       Follow-ups after your visit        Who to contact     If you have questions or need follow up information about today's clinic visit or your schedule please contact [facility] directly at [phone].  Normal or non-critical lab and imaging results will be communicated to you by [employer], letter or phone within 4 business days after the clinic has received the results. If you do not hear from us within 7 days, please contact the clinic through [employer]ush[employer] or phone. If you have a critical or abnormal lab result, we will notify you by phone as soon as possible.  Submit refill requests through [employer] or call your pharmacy and they will forward the refill request to us. Please allow 3 business days for your refill to be completed.          Additional Information About Your Visit        MyChart Information     [employer] lets you send messages to your doctor, view your test results, renew your prescriptions, schedule appointments and more. To sign up, go to [email or web address] . Click on \"Log in\" on the left side of the screen, which will take you to the Welcome page. Then click on \"Sign up Now\" on the right side of the page.     You will be asked to enter the access code listed below, as well as some personal information. Please follow the directions to create your username and password.     Your access code is: [email or web address]  Expires: 2017  4:24 PM     Your access code will  in 90 days. If you need help or a new code, please call your [facility] or [phone].        Care " EveryWhere ID     This is your Care EveryWhere ID. This could be used by other organizations to access your [facility] medical records  [id number]         Blood Pressure from Last 3 Encounters:   05/16/17 122/74    Weight from Last 3 Encounters:   05/24/17 79 kg (174 lb 3.2 oz)   05/16/17 81.2 kg (179 lb)              Today, you had the following     No orders found for display       Primary Care Provider Office Phone #    [facility] [phone]       No address on file        Equal Access to Services     BEAR CORONEL : Hadii aad ku hadasho Soomaali, waaxda luqadaha, qaybta kaalmada adeegyada, waxay idiin hayaan adeeg donnie woodall . So [facility] [phone].    ATENCIÓN: Si habla español, tiene a choi disposición servicios gratuitos de asistencia lingüística. Llame al [phone].    We comply with applicable federal civil rights laws and [place] laws. We do not discriminate on the basis of race, color, national origin, age, disability sex, sexual orientation or gender identity.            Thank you!     Thank you for choosing [facility]  for your care. Our goal is always to provide you with excellent care. Hearing back from our patients is one way we can continue to improve our services. Please take a few minutes to complete the written survey that you may receive in the mail after your visit with us. Thank you!             Your Updated Medication List - Protect others around you: Learn how to safely use, store and throw away your medicines at www.disposemymeds.org.          This list is accurate as of: 7/7/17  2:14 PM.  Always use your most recent med list.                   Brand Name Dispense Instructions for use Diagnosis    ACE/ARB NOT PRESCRIBED (INTENTIONAL)      Please choose reason not prescribed, below    Uncontrolled type 2 diabetes mellitus without complication, without long-term current use of insulin (H)       aspirin 81 MG tablet      Take by mouth daily     Uncontrolled type 2 diabetes mellitus without complication, without long-term current use of insulin (H)       azithromycin 500 MG tablet    ZITHROMAX    3 tablet    Take 1 tablet (500 mg) by mouth daily    Acute recurrent frontal sinusitis       * blood glucose lancets standard    no brand specified    1 Box    Use to test blood sugar two times daily or as directed.    Uncontrolled type 2 diabetes mellitus without complication, without long-term current use of insulin (H)       * blood glucose lancets standard    no brand specified    1 Box    Use to test blood sugar two times daily or as directed.    Uncontrolled type 2 diabetes mellitus without complication, without long-term current use of insulin (H)       blood glucose monitoring test strip    no brand specified    100 strip    Use to test blood sugars two times daily or as directed    Uncontrolled type 2 diabetes mellitus without complication, without long-term current use of insulin (H)       insulin glargine 100 UNIT/ML injection     9 mL    Inject 15 Units Subcutaneous At Bedtime    Uncontrolled type 2 diabetes mellitus without complication, without long-term current use of insulin (H)       insulin pen needle 31G X 6 MM     100 each    Use once daily with Lantus.    Uncontrolled type 2 diabetes mellitus without complication, without long-term current use of insulin (H)       metFORMIN 1000 MG tablet    GLUCOPHAGE    180 tablet    Take 1 tablet (1,000 mg) by mouth 2 times daily (with meals)    Uncontrolled type 2 diabetes mellitus without complication, without long-term current use of insulin (H)       methylPREDNISolone 4 MG tablet    MEDROL DOSEPAK    21 tablet    Follow package instructions    Acute frontal sinusitis, unspecified, Left maxillary sinusitis       STATIN NOT PRESCRIBED (INTENTIONAL)      Please choose reason not prescribed, below    Uncontrolled type 2 diabetes mellitus without complication, without long-term current use of insulin (H)        * Notice:  This list has 2 medication(s) that are the same as other medications prescribed for you. Read the directions carefully, and ask your doctor or other care provider to review them with you.

## 2017-07-13 NOTE — TELEPHONE ENCOUNTER
E-mail sent to patient:   Alonso Lopez,     I hope you are having a great week.  I heard back from Dr. Franco today.  Please increase your Basaglar by 2 units (from15 to 17 units).   Keep up the great work, your numbers are looking good!   Call or email anytime with questions.  Please send in your numbers again in about a week.       Thanks,     Albertina Ron RD, LD   Diabetes Education

## 2017-08-04 ENCOUNTER — ALLIED HEALTH/NURSE VISIT (OUTPATIENT)
Dept: EDUCATION SERVICES | Facility: CLINIC | Age: 72
End: 2017-08-04

## 2017-08-04 ENCOUNTER — TELEPHONE (OUTPATIENT)
Dept: EDUCATION SERVICES | Facility: CLINIC | Age: 72
End: 2017-08-04

## 2017-08-04 RX ORDER — INSULIN GLARGINE 100 [IU]/ML
19 INJECTION, SOLUTION SUBCUTANEOUS AT BEDTIME
Qty: 9 ML | Refills: 5 | Status: SHIPPED | OUTPATIENT
Start: 2017-08-04 | End: 2018-08-21

## 2017-08-04 NOTE — PROGRESS NOTES
Please see telephone encounter dated 08/04/2017 for details.    Mignon Ron RD, LD   Diabetes Education

## 2017-08-04 NOTE — TELEPHONE ENCOUNTER
Diabetes Follow-up    Subjective/Objective:    John Ayala sent in blood glucose log for review. Last date of communication was: 07/07/2017.    Diabetes is being managed with Lifestyle (diet/activity)  Taking diabetes medications?   yes:     Diabetes Medication(s)     Biguanides Sig    metFORMIN (GLUCOPHAGE) 1000 MG tablet Take 1 tablet (1,000 mg) by mouth 2 times daily (with meals)    Insulin Sig    insulin glargine (BASAGLAR KWIKPEN) 100 UNIT/ML injection Inject 15 Units Subcutaneous At Bedtime      Basaglar 0-0-0-17    BG/Food Log:         Assessment:    Fasting blood glucose: 41% in target (7/17 in target)   Before lunch glucose: 100% in target.  After lunch glucose: 100% in target.  Bedtime glucose: 66% in target.    Plan/Response:  Recommend to increase Basaglar by 2 units from 17 to 19 units to target fasting blood sugar levels.  Will request that patient make an in office follow up to review diet and variability in BGs in the evening.       E-mail to patient:   Alonso Lopez,     You are doing a great job!    Exercise will make an impact, but just do the best you can.   Sometimes we have off weeks and that is natural.   Please increase your Basaglar by 2 units from 17 to 19 units.      I think it might be beneficial to review meals again at a follow up appointment in the office?  Are there any days that would work for you?     Thanks,   Albertina Ron RD, LD   Diabetes Education      Any diabetes medication dose changes were made via the CDE Protocol and Collaborative Practice Agreement with the patient's primary care provider. A copy of this encounter was shared with the provider.

## 2017-08-04 NOTE — TELEPHONE ENCOUNTER
I agree with plan to increase basal insulin by 2 units to 19 units to improve fasting/pre-meal blood sugars. He would benefit from seeing CDE to review diet as he has variability in BGs.     Grisel Ledezma RD, LD, CDE

## 2017-08-04 NOTE — MR AVS SNAPSHOT
"              After Visit Summary   2017    John Ayala    MRN: [id number]           Patient Information     Date Of Birth          1945        Visit Information        Provider Department      2017 9:15 AM [place] DI[place] ED RESOURCE [facility]        Today's Diagnoses     Uncontrolled type 2 diabetes mellitus without complication, without long-term current use of insulin (H)    -  1       Follow-ups after your visit        Who to contact     If you have questions or need follow up information about today's clinic visit or your schedule please contact [facility] directly at [phone].  Normal or non-critical lab and imaging results will be communicated to you by [employer], letter or phone within 4 business days after the clinic has received the results. If you do not hear from us within 7 days, please contact the clinic through [employer] or phone. If you have a critical or abnormal lab result, we will notify you by phone as soon as possible.  Submit refill requests through [employer] or call your pharmacy and they will forward the refill request to us. Please allow 3 business days for your refill to be completed.          Additional Information About Your Visit        MyChart Information     [employer] lets you send messages to your doctor, view your test results, renew your prescriptions, schedule appointments and more. To sign up, go to [email or web address] . Click on \"Log in\" on the left side of the screen, which will take you to the Welcome page. Then click on \"Sign up Now\" on the right side of the page.     You will be asked to enter the access code listed below, as well as some personal information. Please follow the directions to create your username and password.     Your access code is: [email or web address]  Expires: 2017  4:24 PM     Your access code will  in 90 days. If you need help or a new code, please call your [facility] or [phone].        Care " EveryWhere ID     This is your Care EveryWhere ID. This could be used by other organizations to access your [place] medical records  [id number]         Blood Pressure from Last 3 Encounters:   05/16/17 122/74    Weight from Last 3 Encounters:   05/24/17 79 kg (174 lb 3.2 oz)   05/16/17 81.2 kg (179 lb)              Today, you had the following     No orders found for display         Today's Medication Changes          These changes are accurate as of: 8/4/17  4:14 PM.  If you have any questions, ask your nurse or doctor.               These medicines have changed or have updated prescriptions.        Dose/Directions    insulin glargine 100 UNIT/ML injection   This may have changed:  how much to take   Used for:  Uncontrolled type 2 diabetes mellitus without complication, without long-term current use of insulin (H)   Changed by:  Mignon Ron RD        Dose:  19 Units   Inject 19 Units Subcutaneous At Bedtime   Quantity:  9 mL   Refills:  5            Where to get your medicines      These medications were sent to [facility] [address] [place] [address]     Phone:  [phone]     insulin glargine 100 UNIT/ML injection                Primary Care Provider Office Phone #    [facility] [phone]       No address on file        Equal Access to Services     BEAR CORONEL AH: Abi chirinoso Sojessali, waaxda luqadaha, qaybta kaalmada adeegyada, waxay leena haystephanie diehl. So [facility] [phone].    ATENCIÓN: Si habla español, tiene a choi disposición servicios gratuitos de asistencia lingüística. Llame al [phone].    We comply with applicable federal civil rights laws and [place] laws. We do not discriminate on the basis of race, color, national origin, age, disability sex, sexual orientation or gender identity.            Thank you!     Thank you for choosing [facility]  for your  care. Our goal is always to provide you with excellent care. Hearing back from our patients is one way we can continue to improve our services. Please take a few minutes to complete the written survey that you may receive in the mail after your visit with us. Thank you!             Your Updated Medication List - Protect others around you: Learn how to safely use, store and throw away your medicines at www.disposemymeds.org.          This list is accurate as of: 8/4/17  4:14 PM.  Always use your most recent med list.                   Brand Name Dispense Instructions for use Diagnosis    ACE/ARB NOT PRESCRIBED (INTENTIONAL)      Please choose reason not prescribed, below    Uncontrolled type 2 diabetes mellitus without complication, without long-term current use of insulin (H)       aspirin 81 MG tablet      Take by mouth daily    Uncontrolled type 2 diabetes mellitus without complication, without long-term current use of insulin (H)       azithromycin 500 MG tablet    ZITHROMAX    3 tablet    Take 1 tablet (500 mg) by mouth daily    Acute recurrent frontal sinusitis       * blood glucose lancets standard    no brand specified    1 Box    Use to test blood sugar two times daily or as directed.    Uncontrolled type 2 diabetes mellitus without complication, without long-term current use of insulin (H)       * blood glucose lancets standard    no brand specified    1 Box    Use to test blood sugar two times daily or as directed.    Uncontrolled type 2 diabetes mellitus without complication, without long-term current use of insulin (H)       blood glucose monitoring test strip    no brand specified    100 strip    Use to test blood sugars two times daily or as directed    Uncontrolled type 2 diabetes mellitus without complication, without long-term current use of insulin (H)       insulin glargine 100 UNIT/ML injection     9 mL    Inject 19 Units Subcutaneous At Bedtime    Uncontrolled type 2 diabetes mellitus without  complication, without long-term current use of insulin (H)       insulin pen needle 31G X 6 MM     100 each    Use once daily with Lantus.    Uncontrolled type 2 diabetes mellitus without complication, without long-term current use of insulin (H)       metFORMIN 1000 MG tablet    GLUCOPHAGE    180 tablet    Take 1 tablet (1,000 mg) by mouth 2 times daily (with meals)    Uncontrolled type 2 diabetes mellitus without complication, without long-term current use of insulin (H)       methylPREDNISolone 4 MG tablet    MEDROL DOSEPAK    21 tablet    Follow package instructions    Acute frontal sinusitis, unspecified, Left maxillary sinusitis       STATIN NOT PRESCRIBED (INTENTIONAL)      Please choose reason not prescribed, below    Uncontrolled type 2 diabetes mellitus without complication, without long-term current use of insulin (H)       * Notice:  This list has 2 medication(s) that are the same as other medications prescribed for you. Read the directions carefully, and ask your doctor or other care provider to review them with you.

## 2017-08-31 ENCOUNTER — RADIANT APPOINTMENT (OUTPATIENT)
Dept: GENERAL RADIOLOGY | Facility: CLINIC | Age: 72
End: 2017-08-31
Attending: INTERNAL MEDICINE
Payer: COMMERCIAL

## 2017-08-31 ENCOUNTER — OFFICE VISIT (OUTPATIENT)
Dept: FAMILY MEDICINE | Facility: CLINIC | Age: 72
End: 2017-08-31
Payer: COMMERCIAL

## 2017-08-31 VITALS
DIASTOLIC BLOOD PRESSURE: 90 MMHG | HEART RATE: 106 BPM | WEIGHT: 186.4 LBS | TEMPERATURE: 97.8 F | BODY MASS INDEX: 27.53 KG/M2 | OXYGEN SATURATION: 100 % | SYSTOLIC BLOOD PRESSURE: 136 MMHG

## 2017-08-31 DIAGNOSIS — R09.1 PLEURISY: ICD-10-CM

## 2017-08-31 DIAGNOSIS — R09.89 CHEST CONGESTION: ICD-10-CM

## 2017-08-31 DIAGNOSIS — R10.33 PERIUMBILICAL PAIN: ICD-10-CM

## 2017-08-31 DIAGNOSIS — J20.9 ACUTE BRONCHITIS, UNSPECIFIED ORGANISM: Primary | ICD-10-CM

## 2017-08-31 DIAGNOSIS — M54.16 LEFT LUMBAR RADICULOPATHY: ICD-10-CM

## 2017-08-31 DIAGNOSIS — R09.82 POSTNASAL DISCHARGE: ICD-10-CM

## 2017-08-31 DIAGNOSIS — E08.9 DIABETES MELLITUS DUE TO UNDERLYING CONDITION, CONTROLLED, WITHOUT COMPLICATION, WITHOUT LONG-TERM CURRENT USE OF INSULIN (H): ICD-10-CM

## 2017-08-31 DIAGNOSIS — J01.81 OTHER ACUTE RECURRENT SINUSITIS: ICD-10-CM

## 2017-08-31 DIAGNOSIS — R91.8 PULMONARY NODULES: ICD-10-CM

## 2017-08-31 PROBLEM — K76.0 FATTY LIVER DISEASE, NONALCOHOLIC: Status: ACTIVE | Noted: 2017-08-31

## 2017-08-31 LAB
ALBUMIN SERPL-MCNC: 4.5 G/DL (ref 3.4–5)
ALP SERPL-CCNC: 77 U/L (ref 40–150)
ALT SERPL W P-5'-P-CCNC: 25 U/L (ref 0–70)
ANION GAP SERPL CALCULATED.3IONS-SCNC: 9 MMOL/L (ref 3–14)
AST SERPL W P-5'-P-CCNC: 26 U/L (ref 0–45)
BASOPHILS # BLD AUTO: 0 10E9/L (ref 0–0.2)
BASOPHILS NFR BLD AUTO: 0.1 %
BILIRUB SERPL-MCNC: 0.6 MG/DL (ref 0.2–1.3)
BUN SERPL-MCNC: 11 MG/DL (ref 7–30)
CALCIUM SERPL-MCNC: 9.5 MG/DL (ref 8.5–10.1)
CHLORIDE SERPL-SCNC: 104 MMOL/L (ref 94–109)
CO2 SERPL-SCNC: 26 MMOL/L (ref 20–32)
CREAT SERPL-MCNC: 0.85 MG/DL (ref 0.66–1.25)
DIFFERENTIAL METHOD BLD: NORMAL
EOSINOPHIL # BLD AUTO: 0.2 10E9/L (ref 0–0.7)
EOSINOPHIL NFR BLD AUTO: 1.9 %
ERYTHROCYTE [DISTWIDTH] IN BLOOD BY AUTOMATED COUNT: 13.1 % (ref 10–15)
GFR SERPL CREATININE-BSD FRML MDRD: 89 ML/MIN/1.7M2
GLUCOSE SERPL-MCNC: 143 MG/DL (ref 70–99)
HBA1C MFR BLD: 6.3 % (ref 4.3–6)
HCT VFR BLD AUTO: 49.9 % (ref 40–53)
HGB BLD-MCNC: 17.3 G/DL (ref 13.3–17.7)
LIPASE SERPL-CCNC: 119 U/L (ref 73–393)
LYMPHOCYTES # BLD AUTO: 1.5 10E9/L (ref 0.8–5.3)
LYMPHOCYTES NFR BLD AUTO: 19.5 %
MCH RBC QN AUTO: 32.2 PG (ref 26.5–33)
MCHC RBC AUTO-ENTMCNC: 34.7 G/DL (ref 31.5–36.5)
MCV RBC AUTO: 93 FL (ref 78–100)
MONOCYTES # BLD AUTO: 0.6 10E9/L (ref 0–1.3)
MONOCYTES NFR BLD AUTO: 7.4 %
NEUTROPHILS # BLD AUTO: 5.5 10E9/L (ref 1.6–8.3)
NEUTROPHILS NFR BLD AUTO: 71.1 %
PLATELET # BLD AUTO: 169 10E9/L (ref 150–450)
POTASSIUM SERPL-SCNC: 5.4 MMOL/L (ref 3.4–5.3)
PROT SERPL-MCNC: 8.3 G/DL (ref 6.8–8.8)
RBC # BLD AUTO: 5.37 10E12/L (ref 4.4–5.9)
SODIUM SERPL-SCNC: 139 MMOL/L (ref 133–144)
WBC # BLD AUTO: 7.8 10E9/L (ref 4–11)

## 2017-08-31 PROCEDURE — 71020 XR CHEST 2 VW: CPT

## 2017-08-31 PROCEDURE — 74010 XR KUB: CPT | Mod: 52

## 2017-08-31 PROCEDURE — 80053 COMPREHEN METABOLIC PANEL: CPT | Performed by: INTERNAL MEDICINE

## 2017-08-31 PROCEDURE — 36415 COLL VENOUS BLD VENIPUNCTURE: CPT | Performed by: INTERNAL MEDICINE

## 2017-08-31 PROCEDURE — 99214 OFFICE O/P EST MOD 30 MIN: CPT | Performed by: INTERNAL MEDICINE

## 2017-08-31 PROCEDURE — 85025 COMPLETE CBC W/AUTO DIFF WBC: CPT | Performed by: INTERNAL MEDICINE

## 2017-08-31 PROCEDURE — 83036 HEMOGLOBIN GLYCOSYLATED A1C: CPT | Performed by: INTERNAL MEDICINE

## 2017-08-31 PROCEDURE — 83690 ASSAY OF LIPASE: CPT | Performed by: INTERNAL MEDICINE

## 2017-08-31 PROCEDURE — 70220 X-RAY EXAM OF SINUSES: CPT

## 2017-08-31 PROCEDURE — 72100 X-RAY EXAM L-S SPINE 2/3 VWS: CPT

## 2017-08-31 RX ORDER — GABAPENTIN 300 MG/1
CAPSULE ORAL
Qty: 90 CAPSULE | Refills: 1 | Status: SHIPPED | OUTPATIENT
Start: 2017-08-31 | End: 2017-09-19

## 2017-08-31 ASSESSMENT — PAIN SCALES - GENERAL: PAINLEVEL: EXTREME PAIN (8)

## 2017-08-31 NOTE — PATIENT INSTRUCTIONS
Based on your medical history and these are the current health maintenance or preventive care services that you are due for (some may have been done at this visit)  Health Maintenance Due   Topic Date Due     FOOT EXAM Q1 YEAR  01/26/1946     EYE EXAM Q1 YEAR  01/26/1946     TSH W/ FREE T4 REFLEX Q2 YEAR  01/26/1946     TETANUS IMMUNIZATION (SYSTEM ASSIGNED)  01/26/1965     HEPATITIS C SCREENING  01/26/1965     COLON CANCER SCREEN (SYSTEM ASSIGNED)  01/26/1997     ADVANCE DIRECTIVE PLANNING Q5 YRS  01/26/2002     AORTIC ANEURYSM SCREENING (SYSTEM ASSIGNED)  01/26/2012         At [facility], we strive to deliver an exceptional experience to you, every time we see you.    If you receive a survey in the mail, please send us back your thoughts. We really do value your feedback.    Your care team's suggested websites for health information:  Www.[employer] : Up to date and easily searchable information on multiple topics.  Www.[employer].gov : medication info, interactive tutorials, watch real surgeries online  Www.familydoctor.org : good info from the Academy of Family Physicians  Www.cdc.gov : public health info, travel advisories, epidemics (H1N1)  Www.aap.org : children's health info, normal development, vaccinations  Www.health.[facility].[place].us : [place] dept of health, public health issues in [place], N1N1    How to contact your care team:   Zach Mohan/Won [phone]         Pharmacy [phone]    Dr. Montes, Mily Randhawa PA-C, Dr. Reese, Elinor Oneil APRN CNP, Maryjo King PA-C, Dr. Grace, and YOMAIRA Canada CNP    Team RNs: Cyndie & Angie      Clinic hours  M-Th 7 am-7 pm   Fri 7 am-5 pm.   Urgent care M-F 11 am-9 pm,   Sat/Sun 9 am-5 pm.  Pharmacy M-Th 8 am-8 pm Fri 8 am-6 pm  Sat/Sun 9 am-5 pm.     All password changes, disabled accounts, or ID changes in [employer]/MyHealth will be done by our Access Services Department.    If you need help with your account or password,  call: [phone]. Clinic staff no longer has the ability to change passwords.

## 2017-08-31 NOTE — MR AVS SNAPSHOT
After Visit Summary   8/31/2017    John Ayala    MRN: [id number]           Patient Information     Date Of Birth          1945        Visit Information        Provider Department      8/31/2017 9:20 AM Vocal, Vineet Márquez MD [facility]        Today's Diagnoses     Acute bronchitis, unspecified organism    -  1    Other acute recurrent sinusitis        Left lumbar radiculopathy        Uncontrolled type 2 diabetes mellitus without complication, without long-term current use of insulin (H)        Pleurisy        Chest congestion        Postnasal discharge        Periumbilical pain          Care Instructions    Based on your medical history and these are the current health maintenance or preventive care services that you are due for (some may have been done at this visit)  Health Maintenance Due   Topic Date Due     FOOT EXAM Q1 YEAR  01/26/1946     EYE EXAM Q1 YEAR  01/26/1946     TSH W/ FREE T4 REFLEX Q2 YEAR  01/26/1946     TETANUS IMMUNIZATION (SYSTEM ASSIGNED)  01/26/1965     HEPATITIS C SCREENING  01/26/1965     COLON CANCER SCREEN (SYSTEM ASSIGNED)  01/26/1997     ADVANCE DIRECTIVE PLANNING Q5 YRS  01/26/2002     AORTIC ANEURYSM SCREENING (SYSTEM ASSIGNED)  01/26/2012         At [facility], we strive to deliver an exceptional experience to you, every time we see you.    If you receive a survey in the mail, please send us back your thoughts. We really do value your feedback.    Your care team's suggested websites for health information:  Www.Panizon.org : Up to date and easily searchable information on multiple topics.  Www.medlineplus.gov : medication info, interactive tutorials, watch real surgeries online  Www.familydoctor.org : good info from the Academy of Family Physicians  Www.cdc.gov : public health info, travel advisories, epidemics (H1N1)  Www.aap.org : children's health info, normal development, vaccinations  Www.health.state.[place]  : [place] dept of health, public health issues in [place], N1N1    How to contact your care team:   Team [facility] [phone]         Pharmacy [phone]    Dr. Montes, Mily Randhawa PA-C, Dr. Reese, Elinor BURNETTE CNP, Maryjo King PA-C, Dr. Grace, and YOMAIRA Canada CNP    Team RNs: Cyndie & Angie      Clinic hours  M-Th 7 am-7 pm   Fri 7 am-5 pm.   Urgent care M-F 11 am-9 pm,   Sat/Sun 9 am-5 pm.  Pharmacy M-Th 8 am-8 pm Fri 8 am-6 pm  Sat/Sun 9 am-5 pm.     All password changes, disabled accounts, or ID changes in Banter!/MyHealth will be done by our Access Services Department.    If you need help with your account or password, call: [phone]. Clinic staff no longer has the ability to change passwords.             Follow-ups after your visit        Future tests that were ordered for you today     Open Future Orders        Priority Expected Expires Ordered    MR Lumbar Spine w/o Contrast Routine  8/31/2018 8/31/2017            Who to contact     If you have questions or need follow up information about today's clinic visit or your schedule please contact [facility] directly at [phone].  Normal or non-critical lab and imaging results will be communicated to you by Vir-Sechart, letter or phone within 4 business days after the clinic has received the results. If you do not hear from us within 7 days, please contact the clinic through P[employer]UR [employer] or phone. If you have a critical or abnormal lab result, we will notify you by phone as soon as possible.  Submit refill requests through Banter! or call your pharmacy and they will forward the refill request to us. Please allow 3 business days for your refill to be completed.          Additional Information About Your Visit        Vir-SecharGuidekick Information     Banter! lets you send messages to your doctor, view your test results, renew your prescriptions, schedule appointments and more. To sign up, go to [email or web address] .  "Click on \"Log in\" on the left side of the screen, which will take you to the Welcome page. Then click on \"Sign up Now\" on the right side of the page.     You will be asked to enter the access code listed below, as well as some personal information. Please follow the directions to create your username and password.     Your access code is: [email or web address]  Expires: 2017 11:52 AM     Your access code will  in 90 days. If you need help or a new code, please call your [place] clinic or [phone].        Care EveryWhere ID     This is your Care EveryWhere ID. This could be used by other organizations to access your [place] medical records  [id number]        Your Vitals Were     Pulse Temperature Pulse Oximetry BMI (Body Mass Index)          106 97.8  F (36.6  C) (Oral) 100% 27.53 kg/m2         Blood Pressure from Last 3 Encounters:   17 136/90   17 122/74    Weight from Last 3 Encounters:   17 186 lb 6.4 oz (84.6 kg)   17 174 lb 3.2 oz (79 kg)   17 179 lb (81.2 kg)              We Performed the Following     CBC with platelets and differential     Comprehensive metabolic panel (BMP + Alb, Alk Phos, ALT, AST, Total. Bili, TP)     Hemoglobin A1c     Lipase     XR Chest 2 Views     XR Lumbar Spine 2/3 Views     XR Sinus Complete G/E 3 Views          Today's Medication Changes          These changes are accurate as of: 17 11:52 AM.  If you have any questions, ask your nurse or doctor.               Start taking these medicines.        Dose/Directions    amoxicillin-clavulanate 875-125 MG per tablet   Commonly known as:  AUGMENTIN   Used for:  Other acute recurrent sinusitis, Acute bronchitis, unspecified organism   Started by:  Vineet Franco MD        Dose:  1 tablet   Take 1 tablet by mouth 2 times daily   Quantity:  20 tablet   Refills:  3       gabapentin 300 MG capsule   Commonly known as:  NEURONTIN   Used for:  Left lumbar radiculopathy   Started by:  Joan " Vineet Márquez MD        Take 1 capsule every morning as scheduled Take additional two capsules the rest of the daytime as needed. Max capsules: 3 /day.   Quantity:  90 capsule   Refills:  1            Where to get your medicines      These medications were sent to [facility] [address] [place] [address] [address] [facility]  [address] [facility] [address]     Phone:  [phone]     amoxicillin-clavulanate 875-125 MG per tablet    gabapentin 300 MG capsule                Primary Care Provider Office Phone #    [facility] [phone]       No address on file        Equal Access to Services     [facility] : Hadii aad ku hadasho Soomaali, waaxda luqadaha, qaybta kaalmada adeegyada, fernando woodall . So [facility] [phone].    ATENCIÓN: Si habla español, tiene a choi disposición servicios gratuitos de asistencia lingüística. Llame al [phone].    We comply with applicable federal civil rights laws and [place] laws. We do not discriminate on the basis of race, color, national origin, age, disability sex, sexual orientation or gender identity.            Thank you!     Thank you for choosing [facility]  for your care. Our goal is always to provide you with excellent care. Hearing back from our patients is one way we can continue to improve our services. Please take a few minutes to complete the written survey that you may receive in the mail after your visit with us. Thank you!             Your Updated Medication List - Protect others around you: Learn how to safely use, store and throw away your medicines at www.disposemymeds.org.          This list is accurate as of: 8/31/17 11:52 AM.  Always use your most recent med list.                   Brand Name Dispense Instructions for use Diagnosis    ACE/ARB NOT PRESCRIBED (INTENTIONAL)      Please choose reason not prescribed, below    Uncontrolled type 2 diabetes mellitus without  complication, without long-term current use of insulin (H)       amoxicillin-clavulanate 875-125 MG per tablet    AUGMENTIN    20 tablet    Take 1 tablet by mouth 2 times daily    Other acute recurrent sinusitis, Acute bronchitis, unspecified organism       aspirin 81 MG tablet      Take by mouth daily    Uncontrolled type 2 diabetes mellitus without complication, without long-term current use of insulin (H)       azithromycin 500 MG tablet    ZITHROMAX    3 tablet    Take 1 tablet (500 mg) by mouth daily    Acute recurrent frontal sinusitis       * blood glucose lancets standard    no brand specified    1 Box    Use to test blood sugar two times daily or as directed.    Uncontrolled type 2 diabetes mellitus without complication, without long-term current use of insulin (H)       * blood glucose lancets standard    no brand specified    1 Box    Use to test blood sugar two times daily or as directed.    Uncontrolled type 2 diabetes mellitus without complication, without long-term current use of insulin (H)       blood glucose monitoring test strip    no brand specified    100 strip    Use to test blood sugars two times daily or as directed    Uncontrolled type 2 diabetes mellitus without complication, without long-term current use of insulin (H)       gabapentin 300 MG capsule    NEURONTIN    90 capsule    Take 1 capsule every morning as scheduled Take additional two capsules the rest of the daytime as needed. Max capsules: 3 /day.    Left lumbar radiculopathy       insulin glargine 100 UNIT/ML injection     9 mL    Inject 19 Units Subcutaneous At Bedtime    Uncontrolled type 2 diabetes mellitus without complication, without long-term current use of insulin (H)       insulin pen needle 31G X 6 MM     100 each    Use once daily with Lantus.    Uncontrolled type 2 diabetes mellitus without complication, without long-term current use of insulin (H)       metFORMIN 1000 MG tablet    GLUCOPHAGE    180 tablet    Take 1  tablet (1,000 mg) by mouth 2 times daily (with meals)    Uncontrolled type 2 diabetes mellitus without complication, without long-term current use of insulin (H)       methylPREDNISolone 4 MG tablet    MEDROL DOSEPAK    21 tablet    Follow package instructions    Acute frontal sinusitis, unspecified, Left maxillary sinusitis       STATIN NOT PRESCRIBED (INTENTIONAL)      Please choose reason not prescribed, below    Uncontrolled type 2 diabetes mellitus without complication, without long-term current use of insulin (H)       * Notice:  This list has 2 medication(s) that are the same as other medications prescribed for you. Read the directions carefully, and ask your doctor or other care provider to review them with you.

## 2017-08-31 NOTE — NURSING NOTE
"Chief Complaint   Patient presents with     Abdominal Pain     Stomach Ache     Sinus Problem     Lung     left       Initial /89 (BP Location: Left arm, Patient Position: Sitting, Cuff Size: Adult Regular)  Pulse 106  Temp 97.8  F (36.6  C) (Oral)  Wt 186 lb 6.4 oz (84.6 kg)  SpO2 100%  BMI 27.53 kg/m2 Estimated body mass index is 27.53 kg/(m^2) as calculated from the following:    Height as of 5/16/17: 5' 9\" (1.753 m).    Weight as of this encounter: 186 lb 6.4 oz (84.6 kg).  Medication Reconciliation: complete   Don TORRE      "

## 2017-08-31 NOTE — PROGRESS NOTES
SUBJECTIVE:   John Ayala is a 72 year old male who presents to clinic today for the following health issues:      Pleurisy    Duration: 1 month    Description (location/character/radiation): left-sided pleuritic pain    Intensity:  severe    Accompanying signs and symptoms:Constantly clearing up phlegm. Denies any hemoptysis, fever or chills.     History (similar episodes/previous evaluation): CT of chest last 5/23/17 shows possible aspiration/infection of both lower lobes.    Precipitating factor: Most likely due to recurrent sinusitis.    Therapies tried and outcome: Azithromycin (took 17 days with modest relief but symptoms recurred).       Abdominal pain    Duration: 4 days    Description (location/character/radiation): Stomach       Associated flank pain: None    Intensity:  moderate, severe    Accompanying signs and symptoms:        Fever/Chills: no        Gas/Bloating: YES       Nausea/vomitting: no        Diarrhea: no        Dysuria or Hematuria: no     History (previous similar pain/trauma/previous testing): YES (history of pancreatitis).    Precipitating or alleviating factors:       Pain worse with eating/BM/urination: Yes       Pain relieved by BM: no     Therapies tried and outcome: None    LMP:  not applicable        Sciatica    Duration: acute    Description (location/character/radiation): Sharp radicular pain to left hip, worse when standing (cannot last more than two minutes). Relieved by sitting.    Intensity:  Moderate    Accompanying signs and symptoms: Denies any motor weakness, loss of urinary/bowel control or saddle anesthesia.    History (similar episodes/previous evaluation): NO    Precipitating or alleviating factors: Overuse    Therapies tried and outcome: None.     Diabetes follow-up  Patient is checking blood sugars: twice daily.    Blood sugar testing frequency justification: Risk of hypoglycemia with medication(s)  Results are as follows:130s, infrequently more than  200    Diabetic concerns: None     Symptoms of hypoglycemia (low blood sugar): none     Paresthesias (numbness or burning in feet) or sores: No     Date of last diabetic eye exam: Due       Amount of exercise or physical activity: None    Problems taking medications regularly: No    Medication side effects: none    Diet: diabetic    Patient Active Problem List   Diagnosis     Obstructive sleep apnea     Uncontrolled type 2 diabetes mellitus without complication, without long-term current use of insulin (H)     Overweight (BMI 25.0-29.9)     Calcified granuloma of lung (H)     Pulmonary nodules     Fatty liver disease, nonalcoholic     No past surgical history on file.    Social History   Substance Use Topics     Smoking status: Former Smoker     Types: Cigarettes     Start date: 1/1/1997     Smokeless tobacco: Not on file     Alcohol use Yes      Comment: socially     No family history on file.      Allergies   Allergen Reactions     Tylenol [Acetaminophen] Shortness Of Breath, Hives and Swelling     Recent Labs   Lab Test  08/31/17   1114  05/16/17   1610  05/16/17   1454   A1C  6.3*  13.4*   --    LDL   --    --   133*   HDL   --    --   48   TRIG   --    --   106   ALT  25  33   --    CR  0.85  0.95   --    GFRESTIMATED  89  79   --    GFRESTBLACK  >90  >90  African American GFR Calc     --    POTASSIUM  5.4*  4.2   --       BP Readings from Last 3 Encounters:   08/31/17 136/90   05/16/17 122/74    Wt Readings from Last 3 Encounters:   08/31/17 186 lb 6.4 oz (84.6 kg)   05/24/17 174 lb 3.2 oz (79 kg)   05/16/17 179 lb (81.2 kg)                  ROS:  C: NEGATIVE for fever, chills, change in weight  I: NEGATIVE for worrisome rashes, moles or lesions  E: NEGATIVE for vision changes or irritation  E/M: NEGATIVE for ear, mouth and throat problems  RESP:NEGATIVE for hemoptysis, pleurisy and wheezing  CV: NEGATIVE for chest pain, palpitations or peripheral edema  GI: NEGATIVE for nausea, abdominal pain, heartburn, or  change in bowel habits  : NEGATIVE for frequency, dysuria, or hematuria  M: NEGATIVE for significant arthralgias or myalgia  N: NEGATIVE for weakness, dizziness or paresthesias  E: NEGATIVE for temperature intolerance, skin/hair changes  H: NEGATIVE for bleeding problems  P: NEGATIVE for changes in mood or affect    OBJECTIVE:     /90  Pulse 106  Temp 97.8  F (36.6  C) (Oral)  Wt 186 lb 6.4 oz (84.6 kg)  SpO2 100%  BMI 27.53 kg/m2  Body mass index is 27.53 kg/(m^2).  GENERAL: healthy, alert and no distress  EYES: Eyes grossly normal to inspection and conjunctivae and sclerae normal  HENT: normal cephalic/atraumatic and oral mucous membranes moist  NECK: no adenopathy and thyroid normal to palpation  RESP: lungs clear to auscultation - no rales, rhonchi or wheezes  CV: regular rate and rhythm, normal S1 S2, no S3 or S4, no murmur, click or rub, no peripheral edema and peripheral pulses strong  ABDOMEN: soft, nontender, no hepatosplenomegaly, no masses and bowel sounds normal  MS: no gross musculoskeletal defects noted, no edema  SKIN: no suspicious lesions or rashes  NEURO: Normal strength and tone, mentation intact and speech normal  PSYCH: mentation appears normal, affect normal/bright    Diagnostic Test Results:  Results for orders placed or performed in visit on 08/31/17   XR Sinus Complete G/E 3 Views    Narrative    XR SINUS COMPLETE G/E 3 VW  8/31/2017 11:37 AM    HISTORY:  Postnasal drip    COMPARISON:  5/16/2017      Impression    IMPRESSION:  Negative.     VICKY YEN MD   XR Chest 2 Views    Narrative    XR CHEST 2 VW  8/31/2017 11:36 AM    HISTORY:  Pleurisy, Other specified symptoms and signs involving the  circulatory and respiratory systems    COMPARISON:  None.      Impression    IMPRESSION:  Calcified right hilar lymph nodes. Stable nodule at the  left lung base. Lungs otherwise clear. No acute process identified.  Degenerative changes of the spine.     VICKY YEN MD   XR Lumbar  Spine 2/3 Views    Narrative    XR LUMBAR SPINE 2-3 VIEWS  8/31/2017 11:37 AM    HISTORY:  Radiculopathy, lumbar region    COMPARISON:  None.      Impression    IMPRESSION:  Multilevel degenerative changes, predominantly seen is  anterior endplate osteophytes. At least moderate disc space height  loss at L5-S1. Grade 1 anterolisthesis of L5 on S1. Posterior  alignment otherwise satisfactory. Atherosclerotic vascular  calcification.    VICKY YEN MD   Hemoglobin A1c   Result Value Ref Range    Hemoglobin A1C 6.3 (H) 4.3 - 6.0 %   Comprehensive metabolic panel (BMP + Alb, Alk Phos, ALT, AST, Total. Bili, TP)   Result Value Ref Range    Sodium 139 133 - 144 mmol/L    Potassium 5.4 (H) 3.4 - 5.3 mmol/L    Chloride 104 94 - 109 mmol/L    Carbon Dioxide 26 20 - 32 mmol/L    Anion Gap 9 3 - 14 mmol/L    Glucose 143 (H) 70 - 99 mg/dL    Urea Nitrogen 11 7 - 30 mg/dL    Creatinine 0.85 0.66 - 1.25 mg/dL    GFR Estimate 89 >60 mL/min/1.7m2    GFR Estimate If Black >90 >60 mL/min/1.7m2    Calcium 9.5 8.5 - 10.1 mg/dL    Bilirubin Total 0.6 0.2 - 1.3 mg/dL    Albumin 4.5 3.4 - 5.0 g/dL    Protein Total 8.3 6.8 - 8.8 g/dL    Alkaline Phosphatase 77 40 - 150 U/L    ALT 25 0 - 70 U/L    AST 26 0 - 45 U/L   CBC with platelets and differential   Result Value Ref Range    WBC 7.8 4.0 - 11.0 10e9/L    RBC Count 5.37 4.4 - 5.9 10e12/L    Hemoglobin 17.3 13.3 - 17.7 g/dL    Hematocrit 49.9 40.0 - 53.0 %    MCV 93 78 - 100 fl    MCH 32.2 26.5 - 33.0 pg    MCHC 34.7 31.5 - 36.5 g/dL    RDW 13.1 10.0 - 15.0 %    Platelet Count 169 150 - 450 10e9/L    Diff Method Automated Method     % Neutrophils 71.1 %    % Lymphocytes 19.5 %    % Monocytes 7.4 %    % Eosinophils 1.9 %    % Basophils 0.1 %    Absolute Neutrophil 5.5 1.6 - 8.3 10e9/L    Absolute Lymphocytes 1.5 0.8 - 5.3 10e9/L    Absolute Monocytes 0.6 0.0 - 1.3 10e9/L    Absolute Eosinophils 0.2 0.0 - 0.7 10e9/L    Absolute Basophils 0.0 0.0 - 0.2 10e9/L   Lipase   Result Value Ref  Range    Lipase 119 73 - 393 U/L       ASSESSMENT/PLAN:     (J20.9) Acute bronchitis, unspecified organism  (primary encounter diagnosis)  Comment: No evidence of pneumonia.  Plan: XR Chest 2 Views, amoxicillin-clavulanate         (AUGMENTIN) 875-125 MG per tablet            (J01.81) Other acute recurrent sinusitis  Comment: Cause of postnasal drainage.  Plan: XR Sinus Complete G/E 3 Views,         amoxicillin-clavulanate (AUGMENTIN) 875-125 MG         per tablet          (M54.16) Left lumbar radiculopathy  Comment: X-ray lumbar spine shows significant findings. No alarming symptoms such as motor weakness or saddle anesthesia.  Plan: XR Lumbar Spine 2/3 Views, gabapentin         (NEURONTIN) 300 MG capsule, MR Lumbar Spine w/o        Contrast            (E08.9) Controlled type 2 diabetes mellitus without complication, without long-term current use of insulin (H)  Comment: Well-controlled with regimen of Lantus and Metformin. Repeat A1c in 6 months.  Plan: Hemoglobin A1c, Comprehensive metabolic panel         (BMP + Alb, Alk Phos, ALT, AST, Total. Bili,         TP), CBC with platelets and differential          (R09.89) Chest congestion  Comment: Secondary to bronchitis. No evidence of pneumonia.  Plan: XR Chest 2 Views            (R09.82) Postnasal discharge  Comment: Secondary to acute sinusitis.  Plan: XR Sinus Complete G/E 3 Views            (R10.33) Periumbilical pain  Comment: No evidence of bowel obstruction.  Plan: XR KUB, Comprehensive metabolic panel (BMP +         Alb, Alk Phos, ALT, AST, Total. Bili, TP), CBC         with platelets and differential, Lipase            Follow-up visit if condition worsens.      Vineet Franco MD  [facility]

## 2017-09-01 ENCOUNTER — TELEPHONE (OUTPATIENT)
Dept: FAMILY MEDICINE | Facility: CLINIC | Age: 72
End: 2017-09-01

## 2017-09-01 NOTE — TELEPHONE ENCOUNTER
Reason for Call:  Other prescription    Detailed comments: Pt calling for he would like to know if he can continue taking his Centrum Silver vitamins while taking Gabapentin. He also would like to let Dr. Franco know that he  has scheduled his CT and MRI on 09/12/17.      Phone Number Patient can be reached at: Home number on file [phone] (home)    Best Time: anytime    Can we leave a detailed message on this number? YES    Call taken on 9/1/2017 at 1:44 PM by Vinicius Feliciano

## 2017-09-01 NOTE — TELEPHONE ENCOUNTER
Yes, continue Centrum silver since in my opinion these supplements may also augment his Gabapentin.

## 2017-09-12 ENCOUNTER — RADIANT APPOINTMENT (OUTPATIENT)
Dept: MRI IMAGING | Facility: CLINIC | Age: 72
End: 2017-09-12
Attending: INTERNAL MEDICINE
Payer: COMMERCIAL

## 2017-09-12 ENCOUNTER — RADIANT APPOINTMENT (OUTPATIENT)
Dept: CT IMAGING | Facility: CLINIC | Age: 72
End: 2017-09-12
Attending: INTERNAL MEDICINE
Payer: COMMERCIAL

## 2017-09-12 DIAGNOSIS — R91.8 PULMONARY NODULES: ICD-10-CM

## 2017-09-12 DIAGNOSIS — M54.16 LEFT LUMBAR RADICULOPATHY: ICD-10-CM

## 2017-09-12 PROCEDURE — 72148 MRI LUMBAR SPINE W/O DYE: CPT | Performed by: RADIOLOGY

## 2017-09-12 PROCEDURE — 71260 CT THORAX DX C+: CPT | Performed by: RADIOLOGY

## 2017-09-12 RX ORDER — IOPAMIDOL 755 MG/ML
91 INJECTION, SOLUTION INTRAVASCULAR ONCE
Status: COMPLETED | OUTPATIENT
Start: 2017-09-12 | End: 2017-09-12

## 2017-09-12 RX ADMIN — IOPAMIDOL 91 ML: 755 INJECTION, SOLUTION INTRAVASCULAR at 16:26

## 2017-09-15 ENCOUNTER — TELEPHONE (OUTPATIENT)
Dept: FAMILY MEDICINE | Facility: CLINIC | Age: 72
End: 2017-09-15

## 2017-09-15 DIAGNOSIS — M54.16 LUMBAR BACK PAIN WITH RADICULOPATHY AFFECTING LEFT LOWER EXTREMITY: Primary | ICD-10-CM

## 2017-09-15 NOTE — TELEPHONE ENCOUNTER
"Notes Recorded by Vineet Franco MD on 9/15/2017 at 4:34 PM  MRI shows the followin) Left disc extrusion at 5th lumbar vertebrae causing nerve impingement on left 5th nerve root. This causes his left-sided radicular pain.  2) Bilateral pars defect at L5 (\"fracture\") causing impingement on right S1 nerve root.    Plan:  1) Take Gabapentin 300 mg TID as scheduled. Consider Tramadol as needed  2) Consider epidural injection to left side of 5th lumbar vertebrae. Let us know so I can send order to [facility]    Patient was informed of test results and recommendations from provider. Patient verbalized understanding of all information given. Will discuss more on Tuesday with you.  Would like to try Tramadol for pain  Routing to provider to review and advise  Yahaira Caicedo RN.     "

## 2017-09-15 NOTE — TELEPHONE ENCOUNTER
Notes Recorded by Vineet Franco MD on 9/15/2017 at 4:38 PM  CT of chest shows multiple pulmonary nodules without any increased in size from May 2017 test.  However, since largest nodule is 1.5 cm (left side of lung), recommend referral to [facility] Nodule clinic at the [facility]. He will be notified (otherwise call [phone])  Other findings show no evidence of pneumonia.    Patient was informed of test results and recommendations from provider. Patient verbalized understanding of all information given. Has an appointment on Tuesday and will discuss further.  Yahaira Caicedo RN.

## 2017-09-18 DIAGNOSIS — M54.16 LUMBAR BACK PAIN WITH RADICULOPATHY AFFECTING LEFT LOWER EXTREMITY: ICD-10-CM

## 2017-09-18 RX ORDER — TRAMADOL HYDROCHLORIDE 50 MG/1
50-100 TABLET ORAL EVERY 8 HOURS PRN
Qty: 20 TABLET | Refills: 2 | Status: SHIPPED | OUTPATIENT
Start: 2017-09-18 | End: 2017-09-18

## 2017-09-18 RX ORDER — TRAMADOL HYDROCHLORIDE 50 MG/1
50-100 TABLET ORAL EVERY 8 HOURS PRN
Qty: 60 TABLET | Refills: 2 | Status: SHIPPED | OUTPATIENT
Start: 2017-09-18 | End: 2017-11-28

## 2017-09-18 NOTE — TELEPHONE ENCOUNTER
Written rx faxed to the pharmacy, Pharmacy will contact pt when medication is ready for pickup.  Ho Roque,  [employer] [facility]

## 2017-09-19 ENCOUNTER — OFFICE VISIT (OUTPATIENT)
Dept: FAMILY MEDICINE | Facility: CLINIC | Age: 72
End: 2017-09-19
Payer: COMMERCIAL

## 2017-09-19 VITALS
BODY MASS INDEX: 27.55 KG/M2 | SYSTOLIC BLOOD PRESSURE: 129 MMHG | HEART RATE: 100 BPM | HEIGHT: 69 IN | OXYGEN SATURATION: 96 % | DIASTOLIC BLOOD PRESSURE: 83 MMHG | WEIGHT: 186 LBS | TEMPERATURE: 99.8 F

## 2017-09-19 DIAGNOSIS — M54.16 LUMBAR BACK PAIN WITH RADICULOPATHY AFFECTING LEFT LOWER EXTREMITY: Primary | ICD-10-CM

## 2017-09-19 DIAGNOSIS — R91.8 PULMONARY NODULES: ICD-10-CM

## 2017-09-19 DIAGNOSIS — I70.0 ATHEROSCLEROSIS OF ABDOMINAL AORTA (H): ICD-10-CM

## 2017-09-19 PROCEDURE — 99214 OFFICE O/P EST MOD 30 MIN: CPT | Performed by: INTERNAL MEDICINE

## 2017-09-19 ASSESSMENT — PAIN SCALES - GENERAL: PAINLEVEL: SEVERE PAIN (6)

## 2017-09-19 NOTE — PROGRESS NOTES
"  SUBJECTIVE:   John Ayala is a 72 year old male who presents to clinic today for the following health issues:      Back pain follow-up      Duration: last week of August 2017    Description (location/character/radiation): MRI of lumbar spine shows disc extrusion to both right and left side of L5-S1, causing bilateral nerve impingement. Mr. Ayala still complains of low back pain radiating to left hip and to lateral aspect of left thigh, but not past the left knee.    Intensity:  moderate    Accompanying signs and symptoms: Denies any motor weakness or loss of bowel/urinary control.    History: Previous complaints of right lumbar radiculopathy.    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 Standing worsens pain. Pain is also worse when we wakes up int he morning. Sitting and laying supine improves pain.    Therapies tried and outcome: Purchased a device called \"Lo-Audra Trax\", but not tried yet. Gabapentin (not effective).     Pulmonary nodule      Description: Diagnosed incidentally during CT of chest last 9/12/2017    Intensity:  Measures 1.5 x 1 cm in the left lingula and 4 mm nodule in the left lower lobe.    Accompanying signs and symptoms: Denies any anorexia or hemoptysis but he did complained of left-sided pleuritic pain, hence he was treated empirically with Augmentin (started prior to obtaining CT of chest).    History (similar episodes/previous evaluation): 5/25/17 CT of chest. Initial CT of chest 15 years ago in [facility]. This initial CT of chest was triggered by an abnormal chest x-ray.    Precipitating or alleviating factors: Previous tobacco use, quit last 1998.         Problem list and histories reviewed & adjusted, as indicated.  Additional history: as documented    Patient Active Problem List   Diagnosis     Obstructive sleep apnea     Uncontrolled type 2 diabetes mellitus without complication, without " long-term current use of insulin (H)     Overweight (BMI 25.0-29.9)     Calcified granuloma of lung (H)     Pulmonary nodules     Fatty liver disease, nonalcoholic     History reviewed. No pertinent surgical history.    Social History   Substance Use Topics     Smoking status: Former Smoker     Types: Cigarettes     Start date: 1/1/1997     Smokeless tobacco: Never Used     Alcohol use Yes      Comment: socially     History reviewed. No pertinent family history.      Allergies   Allergen Reactions     Tylenol [Acetaminophen] Shortness Of Breath, Hives and Swelling     Recent Labs   Lab Test  08/31/17   1114  05/16/17   1610  05/16/17   1454   A1C  6.3*  13.4*   --    LDL   --    --   133*   HDL   --    --   48   TRIG   --    --   106   ALT  25  33   --    CR  0.85  0.95   --    GFRESTIMATED  89  79   --    GFRESTBLACK  >90  >90  African American GFR Calc     --    POTASSIUM  5.4*  4.2   --       BP Readings from Last 3 Encounters:   09/19/17 129/83   08/31/17 136/90   05/16/17 122/74    Wt Readings from Last 3 Encounters:   09/19/17 186 lb (84.4 kg)   08/31/17 186 lb 6.4 oz (84.6 kg)   05/24/17 174 lb 3.2 oz (79 kg)                  ROS:  C: NEGATIVE for fever, chills, change in weight  I: NEGATIVE for worrisome rashes, moles or lesions  E: NEGATIVE for vision changes or irritation  E/M: NEGATIVE for ear, mouth and throat problems  R: NEGATIVE for significant cough or SOB but continues to clear up phlegm not improving despite 20 days of Augmentin. POSITIVE for pleurisy that that appears to be improved with Augmentin. improved.  CV: NEGATIVE for chest pain, palpitations or peripheral edema  GI: NEGATIVE for nausea, abdominal pain, heartburn, or change in bowel habits  : NEGATIVE for frequency, dysuria, or hematuria  M: NEGATIVE for significant arthralgias or myalgia  N: NEGATIVE for weakness, dizziness or paresthesias  E: NEGATIVE for temperature intolerance, skin/hair changes  H: NEGATIVE for bleeding problems  P:  "NEGATIVE for changes in mood or affect    OBJECTIVE:     /83 (BP Location: Left arm, Patient Position: Chair, Cuff Size: Adult Regular)  Pulse 100  Temp 99.8  F (37.7  C) (Oral)  Ht 5' 9\" (1.753 m)  Wt 186 lb (84.4 kg)  SpO2 96%  BMI 27.47 kg/m2  Body mass index is 27.47 kg/(m^2).  GENERAL: healthy, alert and no distress  EYES: Eyes grossly normal to inspection, PERRL and conjunctivae and sclerae normal  HENT: ear canals and TM's normal, nose and mouth without ulcers or lesions  NECK: no adenopathy, no asymmetry, masses, or scars and thyroid normal to palpation  RESP: lungs clear to auscultation - no rales, rhonchi or wheezes  CV: regular rate and rhythm, normal S1 S2, no S3 or S4, no murmur, click or rub, no peripheral edema and peripheral pulses strong  ABDOMEN: soft, nontender, no hepatosplenomegaly, no masses and bowel sounds normal  MS: no gross musculoskeletal defects noted, no edema  SKIN: no suspicious lesions or rashes  NEURO: Normal strength and tone, mentation intact and speech normal  PSYCH: mentation appears normal, affect normal/bright  BACK:  Tenderness on palpation of bilateral lumbar area with positive left straight leg test.    Diagnostic Test Results:  Results for orders placed or performed in visit on 09/12/17   CT Chest w Contrast    Narrative    EXAMINATION: CT CHEST W CONTRAST, 9/12/2017 4:32 PM    COMPARISON: 5/25/2017    HISTORY: Other nonspecific abnormal finding of lung field    TECHNIQUE: CT imaging obtained through the chest with intravenous  contrast. Coronal and axial MIP reformatted images obtained.    Dose DLP: 219 mGy*cm    FINDINGS:  Heart and mediastinum. Calcified mediastinal lymph node.  Heart size is normal. No pericardial effusion. Limited view the  abdomen is unremarkable. No acute bony abnormalities.    LUNGS: Stable centrilobular nodules in the right upper lobe  peripherally (7/122). Pulmonary nodule in the lingula measures 1.5 x 1  cm, stable from prior study " (2/197). Stable 4 mm pulmonary nodule in  the left lower lobe (7/197). No new pulmonary nodules.      Impression    IMPRESSION:   Stable pulmonary nodules, the largest is 1.5 cm in the lingula. No  significant change from 5/25/2017, with stable remainder of additional  pulmonary nodules. Given the size, PET/CT may also be considered, as  malignancy is not excluded, or recommend additional short-term  follow-up CT in 3 months.    JUANJO MYERS MD     MR LUMBAR SPINE W/O CONTRAST 9/12/2017 4:19 PM     History: Radiculopathy, lumbar region     Comparison: None     Technique: Sagittal T1-weighted, sagittal T2-weighted, sagittal STIR,  sagittal diffusion-weighted, axial T2-weighted, and axial gradient  echo images of the lumbar spine were obtained without the  administration of intravenous contrast.     Findings: There are 5 lumbar-type vertebrae assumed for the purposes  of this dictation.  The tip of the conus medullaris is at L1. There is  mild anterolisthesis of L5 on S1.  There is mild to moderate disc  height narrowing L4-L5 and L5-S1.  Within the lumbar vertebrae, bone  marrow signal is normal.       On a level by level basis:     L1-2: Disc bulge. Small annular fissure anteriorly. No significant  neural foraminal or spinal canal narrowing.     L2-3: Mild disc bulge. Minimal facet degenerative changes. Anterior  left disc protrusion. Mild bilateral neural foraminal narrowing. No  significant spinal canal narrowing.     L3-4: Disc bulge with anterior left paramedian disc protrusion. Mild  to moderate bilateral neural foraminal narrowing. No significant  spinal canal narrowing.     L4-5: Disc bulge, asymmetric left with left central to subarticular  disc extrusion with inferior migration of disc material, which  displaces and likely impinges upon the descending left L5 nerve root.  Additionally, mild facet degenerative changes cause moderate spinal  canal narrowing. Moderate to severe bilateral neural  foraminal  narrowing,  left greater than right.     L5-S1: Bilateral L5 pars interarticularis defects with mild grade 1  anterolisthesis. In addition, there is a right subarticular disc  extrusion with posterior displacement and likely impingement upon the  descending right S1 nerve root. Right greater than left moderate  neural foraminal narrowing. No significant spinal canal narrowing.     Multiple cysts in both kidneys. The remainder of the visualized  paraspinal soft tissues are within normal limits.         Impression:   1. Left central to subarticular disc extrusion with slight inferior  migration leading to effacement of the left lateral recess and likely  impingement upon the descending left L5 nerve root.  2. Bilateral pars interarticularis defects at L5 with grade grade 1  anterolisthesis and a right subarticular disc extrusion with likely  impingement upon the descending right S1 nerve root.     I have personally reviewed the examination and initial interpretation  and I agree with the findings.     EDWIN FERRIS MD  ASSESSMENT/PLAN:       (M54.17) Lumbar back pain with radiculopathy affecting left lower extremity  (primary encounter diagnosis)  Comment: results of lumbar spine MRI discussed with the patient. He cannot tolerate Gabapentin, hence switched to Tramadol for short-term relief. Definite treatment include transforminal epidural injection to L4-L5 (left side) since his symptoms are compatible with MRI finding of nerve impingement on descending left L5 nerve root (moderate-severe neural foraminal stenosis, L > R). Referral sent to Pain Management, patient agreeable to plan.   Plan: [facility] ([place]) REFERRAL            (R91.8) Pulmonary nodules  Comment: Results discussed with patient. Due to history of cigarette smoking, patient referred to Lung Nodule Program.      (I70.0) Atherosclerosis of abdominal aorta (H)  Comment: KUB incidentally shows significant abdominal aortic  atherosclerosis, increasing risk of abdominal aortic aneurysm.  Plan: [place] Aorta Medicare AAA Screening            Follow-up visit if condition worsens.      Vineet Franco MD  [facility]

## 2017-09-19 NOTE — PATIENT INSTRUCTIONS
================================================================================  Normal Values   Blood pressure  <140/90 for most adults    <130/80 for some chronic diseases (ask your care team about yours)    BMI (body mass index)  18.5-25 kg/m2 (based on height and weight)     Thank you for visiting [facility]    Normal or non-critical lab and imaging results will be communicated to you by MyChart, letter or phone within 7 days.  If you do not hear from us within 10 days, please call the clinic. If you have a critical or abnormal lab result, we will notify you by phone as soon as possible.     If you have any questions regarding your visit please contact:     Team Comfort:   Clinic Hours Telephone Number   Dr. Vijay Franco 7am-5pm  Monday - Friday [phone]  Ana Syed RN   Pharmacy 8:00am-8pm Monday-Friday    9am-5pm Saturday-Sunday [phone]   Urgent Care 11am-9pm Monday-Friday        9am-5pm Saturday-Sunday [phone]     After hours, weekend or if you need to make an appointment with your primary provider please call [phone].   After Hours nurse advise: call [place] [facility]: [phone]    Medication Refills:  Call your pharmacy and they will forward the refill to us. Please allow 3 business days for your refills to be completed.      PATIENT INSTRUCTIONS:    1) Reduce Gabapentin to 300 mg twice a day x 2 days, then 300 mg once a day at bedtime x 2 days, then 300 mg every other day x 2 days,then 300 mg every 3 days x 2 doses, and then stop Gabapentin.

## 2017-09-19 NOTE — NURSING NOTE
"Chief Complaint   Patient presents with     Results       Initial /83 (BP Location: Left arm, Patient Position: Chair, Cuff Size: Adult Regular)  Pulse 100  Temp 99.8  F (37.7  C) (Oral)  Ht 5' 9\" (1.753 m)  Wt 186 lb (84.4 kg)  SpO2 96%  BMI 27.47 kg/m2 Estimated body mass index is 27.47 kg/(m^2) as calculated from the following:    Height as of this encounter: 5' 9\" (1.753 m).    Weight as of this encounter: 186 lb (84.4 kg).  Medication Reconciliation: complete   CECILIA Carrera MA      "

## 2017-09-19 NOTE — MR AVS SNAPSHOT
After Visit Summary   9/19/2017    John Ayala    MRN: [id number]           Patient Information     Date Of Birth          1945        Visit Information        Provider Department      9/19/2017 5:20 PM Vineet Franco MD [facility]        Today's Diagnoses     Lumbar back pain with radiculopathy affecting left lower extremity    -  1    Pulmonary nodules        Atherosclerosis of abdominal aorta (H)          Care Instructions        ================================================================================  Normal Values   Blood pressure  <140/90 for most adults    <130/80 for some chronic diseases (ask your care team about yours)    BMI (body mass index)  18.5-25 kg/m2 (based on height and weight)     Thank you for visiting [facility]    Normal or non-critical lab and imaging results will be communicated to you by MyChart, letter or phone within 7 days.  If you do not hear from us within 10 days, please call the clinic. If you have a critical or abnormal lab result, we will notify you by phone as soon as possible.     If you have any questions regarding your visit please contact:     Team Comfort:   Clinic Hours Telephone Number   Dr. Vijay Franco 7am-5pm  Monday - Friday [phone]  Ana Syed RN   Pharmacy 8:00am-8pm Monday-Friday    9am-5pm Saturday-Sunday [phone]   Urgent Care 11am-9pm Monday-Friday        9am-5pm Saturday-Sunday [phone]     After hours, weekend or if you need to make an appointment with your primary provider please call [phone].   After Hours nurse advise: call [place] [facility]: [phone]    Medication Refills:  Call your pharmacy and they will forward the refill to us. Please allow 3 business days for your refills to be completed.      PATIENT INSTRUCTIONS:    1) Reduce Gabapentin to 300 mg twice a day  x 2 days, then 300 mg once a day at bedtime x 2 days, then 300 mg every other day x 2 days,then 300 mg every 3 days x 2 doses, and then stop Gabapentin.            Follow-ups after your visit        Additional Services     PAIN MANAGEMENT CENTER ([place]) REFERRAL       Your provider has referred you to the [facility].    Reason for Referral: Procedure or injection only - patient will be contacted within 24 hrs to schedule: Epidural Steroid (transforaminal approach): Lumbar    Please complete the following questions:    What is your diagnosis for the patient's pain? Left lumbar radiculopathy, affecting L5-S1 dermatomal level.    Do you have any specific questions for the pain specialist? No    Are there any red flags that may impact the assessment or management of the patient? None    **ANY DIAGNOSTIC TESTS THAT ARE NOT IN EPIC SHOULD BE SENT TO THE PAIN CENTER**    Please note the Pre-Op Pain Consult must be scheduled 2-3 weeks prior to the patient's surgery.  Patient's trying to schedule within 2 weeks of surgery may not be accommodated.     Pre-Op Pain Consults are only good for 30 days.    REGARDING OPIOID MEDICATIONS:  We will always address appropriateness of opioid pain medications, but we generally will not automatically take on a prescribing role. When we do take on prescribing of opioids for chronic pain, it is in collaboration with the referring physician for an intermediate period of time (months), with an expectation that the primary physician or provider will assume the prescribing role if medications are effective at stable doses with demonstrated compliance.  Therefore, please do not assume that your prescribing responsibilities end on the day of pain clinic consultation.  Is this agreeable to you? YES    For any questions, contact the [facility] at [phone].    Please be aware that coverage of these services is subject to the terms and limitations of  "your health insurance plan.  Call member services at your health plan with any benefit or coverage questions.      Please bring the following with you to your appointment:    (1) Any X-Rays, CTs or MRIs which have been performed.  Contact the facility where they were done to arrange for  prior to your scheduled appointment.    (2) List of current medications   (3) This referral request   (4) Any documents/labs given to you for this referral                  Future tests that were ordered for you today     Open Future Orders        Priority Expected Expires Ordered    US Aorta Medicare AAA Screening Routine  9/19/2018 9/19/2017            Who to contact     If you have questions or need follow up information about today's clinic visit or your schedule please contact [facility] directly at [phone].  Normal or non-critical lab and imaging results will be communicated to you by MyChart, letter or phone within 4 business days after the clinic has received the results. If you do not hear from us within 7 days, please contact the clinic through MyChart or phone. If you have a critical or abnormal lab result, we will notify you by phone as soon as possible.  Submit refill requests through [employer] or call your pharmacy and they will forward the refill request to us. Please allow 3 business days for your refill to be completed.          Additional Information About Your Visit        Vo[employer] Information     [employer] lets you send messages to your doctor, view your test results, renew your prescriptions, schedule appointments and more. To sign up, go to [email or web address] . Click on \"Log in\" on the left side of the screen, which will take you to the Welcome page. Then click on \"Sign up Now\" on the right side of the page.     You will be asked to enter the access code listed below, as well as some personal information. Please follow the directions to create your username and password.   " "  Your access code is: [email or web address]  Expires: 2017 11:52 AM     Your access code will  in 90 days. If you need help or a new code, please call your [place] clinic or [phone].        Care EveryWhere ID     This is your Care EveryWhere ID. This could be used by other organizations to access your [place] medical records  [id number]        Your Vitals Were     Pulse Temperature Height Pulse Oximetry BMI (Body Mass Index)       100 99.8  F (37.7  C) (Oral) 5' 9\" (1.753 m) 96% 27.47 kg/m2        Blood Pressure from Last 3 Encounters:   17 129/83   17 136/90   17 122/74    Weight from Last 3 Encounters:   17 186 lb (84.4 kg)   17 186 lb 6.4 oz (84.6 kg)   17 174 lb 3.2 oz (79 kg)              We Performed the Following     PAIN MANAGEMENT CENTER ([place]) REFERRAL          Today's Medication Changes          These changes are accurate as of: 17  6:33 PM.  If you have any questions, ask your nurse or doctor.               Stop taking these medicines if you haven't already. Please contact your care team if you have questions.     gabapentin 300 MG capsule   Commonly known as:  NEURONTIN   Stopped by:  Vineet Franco MD                    Primary Care Provider Office Phone #    [facility] [phone]       No address on file        Equal Access to Services     BEAR CORONEL AH: Hadii ashwin chirinoso Solisa, waaxda luqadaha, qaybta kaalmada teo, fernando juan adelaron diehl. So [facility] [phone].    ATENCIÓN: Si habla español, tiene a choi disposición servicios gratuitos de asistencia lingüística. Llame al [phone].    We comply with applicable federal civil rights laws and [place] laws. We do not discriminate on the basis of race, color, national origin, age, disability sex, sexual orientation or gender identity.            Thank you!     Thank you for choosing [facility]  for your care. Our goal " is always to provide you with excellent care. Hearing back from our patients is one way we can continue to improve our services. Please take a few minutes to complete the written survey that you may receive in the mail after your visit with us. Thank you!             Your Updated Medication List - Protect others around you: Learn how to safely use, store and throw away your medicines at www.disposemymeds.org.          This list is accurate as of: 9/19/17  6:33 PM.  Always use your most recent med list.                   Brand Name Dispense Instructions for use Diagnosis    ACE/ARB NOT PRESCRIBED (INTENTIONAL)      Please choose reason not prescribed, below    Uncontrolled type 2 diabetes mellitus without complication, without long-term current use of insulin (H)       amoxicillin-clavulanate 875-125 MG per tablet    AUGMENTIN    20 tablet    Take 1 tablet by mouth 2 times daily    Other acute recurrent sinusitis, Acute bronchitis, unspecified organism       aspirin 81 MG tablet      Take by mouth daily    Uncontrolled type 2 diabetes mellitus without complication, without long-term current use of insulin (H)       azithromycin 500 MG tablet    ZITHROMAX    3 tablet    Take 1 tablet (500 mg) by mouth daily    Acute recurrent frontal sinusitis       * blood glucose lancets standard    no brand specified    1 Box    Use to test blood sugar two times daily or as directed.    Uncontrolled type 2 diabetes mellitus without complication, without long-term current use of insulin (H)       * blood glucose lancets standard    no brand specified    1 Box    Use to test blood sugar two times daily or as directed.    Uncontrolled type 2 diabetes mellitus without complication, without long-term current use of insulin (H)       blood glucose monitoring test strip    no brand specified    100 strip    Use to test blood sugars two times daily or as directed    Uncontrolled type 2 diabetes mellitus without complication, without  long-term current use of insulin (H)       insulin glargine 100 UNIT/ML injection     9 mL    Inject 19 Units Subcutaneous At Bedtime    Uncontrolled type 2 diabetes mellitus without complication, without long-term current use of insulin (H)       insulin pen needle 31G X 6 MM     100 each    Use once daily with Lantus.    Uncontrolled type 2 diabetes mellitus without complication, without long-term current use of insulin (H)       metFORMIN 1000 MG tablet    GLUCOPHAGE    180 tablet    Take 1 tablet (1,000 mg) by mouth 2 times daily (with meals)    Uncontrolled type 2 diabetes mellitus without complication, without long-term current use of insulin (H)       methylPREDNISolone 4 MG tablet    MEDROL DOSEPAK    21 tablet    Follow package instructions    Acute frontal sinusitis, unspecified, Left maxillary sinusitis       STATIN NOT PRESCRIBED (INTENTIONAL)      Please choose reason not prescribed, below    Uncontrolled type 2 diabetes mellitus without complication, without long-term current use of insulin (H)       traMADol 50 MG tablet    ULTRAM    60 tablet    Take 1-2 tablets ( mg) by mouth every 8 hours as needed for pain maximum 6 tablet(s) per day    Lumbar back pain with radiculopathy affecting left lower extremity       * Notice:  This list has 2 medication(s) that are the same as other medications prescribed for you. Read the directions carefully, and ask your doctor or other care provider to review them with you.

## 2017-09-20 ENCOUNTER — TELEPHONE (OUTPATIENT)
Dept: FAMILY MEDICINE | Facility: CLINIC | Age: 72
End: 2017-09-20

## 2017-09-20 ENCOUNTER — TELEPHONE (OUTPATIENT)
Dept: PALLIATIVE MEDICINE | Facility: CLINIC | Age: 72
End: 2017-09-20

## 2017-09-20 NOTE — TELEPHONE ENCOUNTER
Pre-screening Questions for Radiology Injections:    Injection to be done at which interventional clinic site? [facility] [place]    Procedure ordered by Dr. Franco    Procedure ordered? Lumbar Epidural Steroid Injection    What insurance would patient like us to bill for this procedure? [employer]      Worker's comp-Any injection DO NOT SCHEDULE and route to Kelli Jauregui.      [employer] insurance - For SI joint injections, DO NOT SCHEDULE and route Ryann Mcadams.      [employer]- MBB's must be scheduled at LEAST two weeks apart      [employer] - Any injection besides hip/shoulder/knee joint DO NOT SCHEDULE and route to Ryann Mcadams. She will obtain PA and call pt back to schedule procedure or notify pt of denial.     HP CIGNA-PA REQUIRED FOR NON-GIFTY OR Joint injections    Any chance of pregnancy? Not Applicable   If YES, do NOT schedule and route to RN pool    Is an  needed? No     Patient has a drive home? (mandatory) YES: INFORMED    Is patient taking any blood thinners (plavix, coumadin, jantoven, warfarin, heparin, pradaxa or dabigatran )? No   If hold needed, do NOT schedule, route to RN pool     Is patient taking any aspirin products? Yes - Pt takes 81mg daily; instructed to hold 0 day(s) prior to procedure.      If more than 325mg/day do NOT schedule; route to RN pool     For CERVICAL procedures, hold all aspirin products for 6 days.      Does the patient have a bleeding or clotting disorder? No     If YES, okay to schedule AND route to RN nurse pool    **For any patients with platelet count <100, must be forwarded to provider**    Is patient diabetic?  Yes  If YES, have them bring their glucometer.    Does patient have an active infection or treated for one within the past week? Yes - Chest/Sinus Infection     Is patient currently taking any antibiotics?  Yes - Amoxicillan - ending 9/20     For patients on chronic, preventative, or prophylactic antibiotics, procedures may be  scheduled.     For patients on antibiotics for active or recent infection:    Savita Calderon Burton-antibiotic course must have been completed for 4 days    Dr. Terrazas-antibiotic course must have been completed for 7 days    Is patient currently taking any steroid medications? (i.e. Prednisone, Medrol)  No     For patients on steroid medications:    Savita Calderon Burton-steroid course must have been completed for 4 days    -steroid course must have been completed for 7 days    Reviewed with patient:  If you are started on any steroids or antibiotics between now and your appointment, you must contact us because it may affect our ability to perform your procedure.  Yes    Is patient actively being treated for cancer or immunocompromised, including the spleen having been removed? No  If YES, do NOT schedule and route to RN pool       Are you able to get on and off an exam table with minimal or no assistance? Yes  If NO, do NOT schedule and route to RN pool    Are you able to roll over and lay on your stomach with minimal or no assistance? Yes  If NO, do NOT schedule and route to RN pool     Any allergies to contrast dye, iodine, shellfish, or numbing and steroid medications? No  If YES, route to RN pool AND add allergy information to appointment notes    Allergies: Tylenol [acetaminophen]        Has the patient had a flu shot or any other vaccinations within 7 days before or after the procedure.  No       Does patient have an MRI/CT?  YES: MRI  (SI joint, hip injections, lumbar sympathetic blocks, and stellate ganglion blocks do not require an MRI)    Was the MRI done w/in the last 3 years?  Yes    Was MRI done at [facility]? Yes      If not, where was it done? N/A       If MRI was not done at [facility], [facility] or [facility] do NOT schedule and route to nursing.  If pt has an imaging disc, the injection may be scheduled but pt has to bring disc to appt. If they show up w/out disc the injection  cannot be done    Reminders (please tell patient if applicable):       Instructed pt to arrive 30 minutes early for IV start if this is for a cervical procedure, ALL sympathetic (stellate ganglion, hypogastric, or lumbar sympathetic block) and all sedation procedures (RFA, spinal cord stimulation trials).  Not Applicable    -IVs are not routinely placed for Dr. Randall cervical cases       If NPO for sedation, informed patient that it is okay to take medications with sips of water (except if they are to hold blood thinners).  Not Applicable   *DO take blood pressure medication if it is prescribed*      If this is for a cervical GIFTY, informed patient that aspirin needs to be held for 6 days.   Not Applicable      For all patients not having spinal cord stimulator (SCS) trials or radiofrequency ablations (RFAs), informed patient:  IV sedation is not provided for this procedure.  If you feel that an oral anti-anxiety medication is needed, you can discuss this further with your referring provider or primary care provider.  The Pain Clinic provider will discuss specifics of what the procedure includes at your appointment.  Most procedures last 10-20 minutes.  We use numbing medications to help with any discomfort during the procedure.  Not Applicable      Do not schedule procedures requiring IV placement in the first appointment of the day or first appointment after lunch. N/A      For patients 85 or older we recommend having an adult stay w/ them for the remainder of the day.   N/A    Does the patient have any questions?  NO  Sophie Londono  [facility]

## 2017-09-20 NOTE — TELEPHONE ENCOUNTER
.Reason for Call:  Letter for Jury Duty    Detailed comments: Patient is requesting a letter from Dr. Franco stating that he was under his care doing the time of his jury duty which is on the 16 of October in [place]. Patient said he can't make it to the Jury duty and also make it on time for his appointment which is on October 18 so he would like to get a letter from Dr. Franco stating why he can't make it to his Jury duty. Patient would like to be contacted if any question.    Phone Number Patient can be reached at: Cell number on file:    Telephone Information:   Mobile [phone]       Best Time: any    Can we leave a detailed message on this number? YES    Call taken on 9/20/2017 at 1:56 PM by Tello Maza

## 2017-09-20 NOTE — LETTER
John Ayala  [address] [place] [address]      September 20, 2017    To Whom It May Concern:             This is regarding the case of Mr. John Ayala. He has been under my care since May 2017. Mr. Ayala has insulin-dependent  Type 2 diabetes and degenerative disc disease of the lumbar spine. During his recent clinic visit this provider, Mr. Ayala was also diagnosed to have atherosclerosis of the abdominal aorta. He is scheduled to undergo an ultrasound of the abdomen on October 16, 2017 . This is an important test that he cannot miss since Mr. Ayala has higher risk of abdominal aortic aneurysm. Therefore, please excuse him from jury duty on October 16, 2017. For any questions, you may call my office at [phone].    Sincerely,         Vineet Franco MD  Internal Medicine

## 2017-09-21 NOTE — TELEPHONE ENCOUNTER
PA pending additional information - 9/19 office visit to be completed and signed. Routing to Dr. Franco to determine.      Please do not close encounter        Ryann LUEVANO    [facility]

## 2017-09-22 NOTE — TELEPHONE ENCOUNTER
Called and spoke with pt, he stated he will pickup the letter from Dr. Franco from the . Told pt to come after 1pm and bring a photo ID.  Ho Roque,  [facility]    Letter is brought to the .  Ho Roque,  [facility]

## 2017-10-02 ENCOUNTER — TELEPHONE (OUTPATIENT)
Dept: FAMILY MEDICINE | Facility: CLINIC | Age: 72
End: 2017-10-02

## 2017-10-02 NOTE — TELEPHONE ENCOUNTER
Reason for Call:  Other call back    Detailed comments: John called to inform you that he cannot make an appointment to the pain management clinic until they receive Prior Authorization form filled out and signed by Dr Franco.  Please send this over as soon as possible and call John to let him know that it was sent so he can get that appointment scheduled.   Thank you     Phone Number Patient can be reached at: Home number on file [phone] (home)    Best Time: Any    Can we leave a detailed message on this number? YES    Call taken on 10/2/2017 at 1:56 PM by Magaly Joel

## 2017-10-03 ENCOUNTER — OFFICE VISIT (OUTPATIENT)
Dept: NEUROSURGERY | Facility: CLINIC | Age: 72
End: 2017-10-03
Payer: COMMERCIAL

## 2017-10-03 VITALS
RESPIRATION RATE: 16 BRPM | SYSTOLIC BLOOD PRESSURE: 148 MMHG | HEIGHT: 69 IN | DIASTOLIC BLOOD PRESSURE: 86 MMHG | BODY MASS INDEX: 27.16 KG/M2 | OXYGEN SATURATION: 96 % | TEMPERATURE: 97.3 F | HEART RATE: 100 BPM | WEIGHT: 183.4 LBS

## 2017-10-03 DIAGNOSIS — M54.42 CHRONIC BILATERAL LOW BACK PAIN WITH LEFT-SIDED SCIATICA: Primary | ICD-10-CM

## 2017-10-03 DIAGNOSIS — M54.16 LUMBAR RADICULOPATHY: ICD-10-CM

## 2017-10-03 DIAGNOSIS — G89.29 CHRONIC BILATERAL LOW BACK PAIN WITH LEFT-SIDED SCIATICA: Primary | ICD-10-CM

## 2017-10-03 DIAGNOSIS — M51.369 DEGENERATIVE DISC DISEASE, LUMBAR: ICD-10-CM

## 2017-10-03 PROCEDURE — 99203 OFFICE O/P NEW LOW 30 MIN: CPT | Performed by: NEUROLOGICAL SURGERY

## 2017-10-03 ASSESSMENT — PAIN SCALES - GENERAL: PAINLEVEL: SEVERE PAIN (6)

## 2017-10-03 NOTE — MR AVS SNAPSHOT
After Visit Summary   10/3/2017    John Ayala    MRN: [id number]           Patient Information     Date Of Birth          1945        Visit Information        Provider Department      10/3/2017 2:00 PM Benjy Blackman MD [facility]        Today's Diagnoses     Lumbago    -  1      Care Instructions    I recommend spinal injection and xray of low back. You will receive a call to schedule this.   Follow up once complete. Please call the clinic with any questions. [phone]            Follow-ups after your visit        Your next 10 appointments already scheduled     Oct 18, 2017  1:00 PM CDT   US AORTA MEDICARE AAA SCREENING with [id number]   [facility]    [address] [facility] [address] [phone]           Please bring a list of your medicines (including vitamins, minerals and over-the-counter drugs). Also, tell your doctor about any allergies you may have. Wear comfortable clothes and leave your valuables at home.  Adults: No eating or drinking for 8 hours before the exam. You may take medicine with a small sip of water.  Children: - Children 6+ years: No food or drink for 6 hours before exam. - Children 1-5 years: No food or drink for 4 hours before exam. - Infants, breast-fed: may have breast milk up to 2 hours before exam. - Infants, formula: may have bottle until 4 hours before exam.  Please call the Imaging Department at your exam site with any questions.              Future tests that were ordered for you today     Open Future Orders        Priority Expected Expires Ordered    XR Lumbar Spine G/E 4 Views Routine 10/3/2017 10/3/2018 10/3/2017            Who to contact     If you have questions or need follow up information about today's clinic visit or your schedule please contact [facility] directly at [phone].  Normal or non-critical lab and imaging results will be  "communicated to you by [employer], letter or phone within 4 business days after the clinic has received the results. If you do not hear from us within 7 days, please contact the clinic through [employer] or phone. If you have a critical or abnormal lab result, we will notify you by phone as soon as possible.  Submit refill requests through [employer] or call your pharmacy and they will forward the refill request to us. Please allow 3 business days for your refill to be completed.          Additional Information About Your Visit        [employer] Information     [employer] lets you send messages to your doctor, view your test results, renew your prescriptions, schedule appointments and more. To sign up, go to [email or web address] . Click on \"Log in\" on the left side of the screen, which will take you to the Welcome page. Then click on \"Sign up Now\" on the right side of the page.     You will be asked to enter the access code listed below, as well as some personal information. Please follow the directions to create your username and password.     Your access code is: [email or web address]  Expires: 2017 11:52 AM     Your access code will  in 90 days. If you need help or a new code, please call your [facility] clinic or [phone].        Care EveryWhere ID     This is your Care EveryWhere ID. This could be used by other organizations to access your [facility] medical records  [id number]        Your Vitals Were     Pulse Temperature Respirations Height Pulse Oximetry BMI (Body Mass Index)    100 97.3  F (36.3  C) (Oral) 16 5' 9\" (1.753 m) 96% 27.08 kg/m2       Blood Pressure from Last 3 Encounters:   10/03/17 148/86   17 129/83   17 136/90    Weight from Last 3 Encounters:   10/03/17 183 lb 6.4 oz (83.2 kg)   17 186 lb (84.4 kg)   17 186 lb 6.4 oz (84.6 kg)               Primary Care Provider Office Phone # Fax #    [facility] [phone]       [address] [place] [address]        Equal Access to Services     [facility]: Hadii ashwin mills bryan Pratt, waaxda luqadaha, qaybta kaalmada teo, fernando diehl. So [facility] [phone].    ATENCIÓN: Si habla español, tiene a choi disposición servicios gratuitos de asistencia lingüística. Saritaame al [phone].    We comply with applicable federal civil rights laws and [place] laws. We do not discriminate on the basis of race, color, national origin, age, disability, sex, sexual orientation, or gender identity.            Thank you!     Thank you for choosing [facility] [place]  for your care. Our goal is always to provide you with excellent care. Hearing back from our patients is one way we can continue to improve our services. Please take a few minutes to complete the written survey that you may receive in the mail after your visit with us. Thank you!             Your Updated Medication List - Protect others around you: Learn how to safely use, store and throw away your medicines at www.disposemymeds.org.          This list is accurate as of: 10/3/17  2:06 PM.  Always use your most recent med list.                   Brand Name Dispense Instructions for use Diagnosis    ACE/ARB NOT PRESCRIBED (INTENTIONAL)      Please choose reason not prescribed, below    Uncontrolled type 2 diabetes mellitus without complication, without long-term current use of insulin (H)       amoxicillin-clavulanate 875-125 MG per tablet    AUGMENTIN    20 tablet    Take 1 tablet by mouth 2 times daily    Other acute recurrent sinusitis, Acute bronchitis, unspecified organism       aspirin 81 MG tablet      Take by mouth daily    Uncontrolled type 2 diabetes mellitus without complication, without long-term current use of insulin (H)       azithromycin 500 MG tablet    ZITHROMAX    3 tablet    Take 1 tablet (500 mg) by mouth daily    Acute recurrent frontal sinusitis       BASAGLAR 100 UNIT/ML injection     9 mL     Inject 19 Units Subcutaneous At Bedtime    Uncontrolled type 2 diabetes mellitus without complication, without long-term current use of insulin (H)       * blood glucose lancets standard    no brand specified    1 Box    Use to test blood sugar two times daily or as directed.    Uncontrolled type 2 diabetes mellitus without complication, without long-term current use of insulin (H)       * blood glucose lancets standard    no brand specified    1 Box    Use to test blood sugar two times daily or as directed.    Uncontrolled type 2 diabetes mellitus without complication, without long-term current use of insulin (H)       blood glucose monitoring test strip    no brand specified    100 strip    Use to test blood sugars two times daily or as directed    Uncontrolled type 2 diabetes mellitus without complication, without long-term current use of insulin (H)       insulin pen needle 31G X 6 MM     100 each    Use once daily with Lantus.    Uncontrolled type 2 diabetes mellitus without complication, without long-term current use of insulin (H)       metFORMIN 1000 MG tablet    GLUCOPHAGE    180 tablet    Take 1 tablet (1,000 mg) by mouth 2 times daily (with meals)    Uncontrolled type 2 diabetes mellitus without complication, without long-term current use of insulin (H)       methylPREDNISolone 4 MG tablet    MEDROL DOSEPAK    21 tablet    Follow package instructions    Acute frontal sinusitis, unspecified, Left maxillary sinusitis       STATIN NOT PRESCRIBED (INTENTIONAL)      Please choose reason not prescribed, below    Uncontrolled type 2 diabetes mellitus without complication, without long-term current use of insulin (H)       traMADol 50 MG tablet    ULTRAM    60 tablet    Take 1-2 tablets ( mg) by mouth every 8 hours as needed for pain maximum 6 tablet(s) per day    Lumbar back pain with radiculopathy affecting left lower extremity       * Notice:  This list has 2 medication(s) that are the same as other  medications prescribed for you. Read the directions carefully, and ask your doctor or other care provider to review them with you.

## 2017-10-03 NOTE — PROGRESS NOTES
Neurosurgery Consult Note   [facility]        CC: LBP and LLE pain    Primary care Provider: [facility], [facility]    Referring provider:   No referring provider defined for this encounter.      Te-Moak: John Ayala is a 72 year old male that presents to clinic with a complaint of low back and LLE pain. He has had RLE in the past. He says the pain in his LLE began about 5 weeks ago. He has a difficult time with standing and walking. He has not tried PT or GIFTY, but, is waiting for approval for his GIFTY.      History reviewed. No pertinent past medical history.    History reviewed. No pertinent surgical history.    Current Outpatient Prescriptions   Medication     traMADol (ULTRAM) 50 MG tablet     amoxicillin-clavulanate (AUGMENTIN) 875-125 MG per tablet     insulin glargine (BASAGLAR KWIKPEN) 100 UNIT/ML injection     azithromycin (ZITHROMAX) 500 MG tablet     metFORMIN (GLUCOPHAGE) 1000 MG tablet     aspirin 81 MG tablet     methylPREDNISolone (MEDROL DOSEPAK) 4 MG tablet     ACE/ARB NOT PRESCRIBED, INTENTIONAL,     STATIN NOT PRESCRIBED, INTENTIONAL,     insulin pen needle 31G X 6 MM     blood glucose monitoring (NO BRAND SPECIFIED) test strip     blood glucose (NO BRAND SPECIFIED) lancets standard     blood glucose (NO BRAND SPECIFIED) lancets standard     No current facility-administered medications for this visit.        Allergies   Allergen Reactions     Tylenol [Acetaminophen] Shortness Of Breath, Hives and Swelling       Social History     Social History     Marital status: Single     Spouse name: N/A     Number of children: N/A     Years of education: N/A     Social History Main Topics     Smoking status: Former Smoker     Types: Cigarettes     Start date: 1/1/1997     Smokeless tobacco: Never Used     Alcohol use Yes      Comment: socially     Drug use: No     Sexual activity: Not Currently     Other Topics Concern     None     Social History Narrative       History  reviewed. No pertinent family history.      Review Of Systems  Skin: negative  Eyes: negative  Ears/Nose/Throat: negative  Respiratory: No shortness of breath, dyspnea on exertion, cough, or hemoptysis  Cardiovascular: negative  Gastrointestinal: negative  Genitourinary: negative  Musculoskeletal: as above  Neurologic: as above  Psychiatric: negative  Hematologic/Lymphatic/Immunologic: negative  Endocrine: negative    B/P: 148/86, T: 97.3, P: 100, R: 16    Examination:  Awake  Alert  Oriented x 3  Speech clear  Cranial nerves II - XII intact  Face symmetric  Back nontender  Normal ROM of back  Motor exam    RLE - iliopsoas 5/5, quads 5/5, hamstrings 5/5, dorsiflexion 5/5, plantar flexion 5/5, eversion 5/5, inversion 5/5, EHL 5/5   LLE -  iliopsoas 5/5, quads 5/5, hamstrings 5/5, dorsiflexion 5/5, plantar flexion 5/5, eversion 5/5, inversion 5/5, EHL 5/5  Sensation intact  Clonus negative  DTR 1+  Positive Lase'yina's sign on the left at 50 degrees   Ambulation limps    Imaging:   MRI lumbar - left L4-5 herniated disk with compression of the left L5 root and DDD. He also has a right L5-S1 herniated disk with DDD and possible pars defect      Assessment/Plan:   He would like to try the upcoming GIFTY once approved  Will obtain flexion/extension lumbar  RTC after GIFTY      Benjy Blackman MD, MS, FAANS  Neurosurgeon  [facility]  [address] [place] [address] [place] [address]  Tel [phone]

## 2017-10-03 NOTE — NURSING NOTE
"John Ayala is a 72 year old male who presents for:  Chief Complaint   Patient presents with     Back Pain     Low back pain with left sciatica. Onset: last week of 8/2017. MRI 9/12/17. Xray 8/31/17. Years ago he had a back injury at work, no repercussions. Patient states he really doesn't feel anything in the back it is in the buttock region, radiates down posterior leg to above the knee. He has N/T down left leg. No treatments except pain killers.         Initial Vitals:  /86  Pulse 100  Temp 97.3  F (36.3  C) (Oral)  Resp 16  Ht 5' 9\" (1.753 m)  Wt 183 lb 6.4 oz (83.2 kg)  SpO2 96%  BMI 27.08 kg/m2 Estimated body mass index is 27.08 kg/(m^2) as calculated from the following:    Height as of this encounter: 5' 9\" (1.753 m).    Weight as of this encounter: 183 lb 6.4 oz (83.2 kg).. Body surface area is 2.01 meters squared. BP completed using cuff size: regular  Moderate Pain (4)    Do you feel safe in your environment?  Yes  Do you need any refills today? No    Nursing Comments: Low back pain with left sciatica. Onset: last week of 8/2017. MRI 9/12/17. Xray 8/31/17. Years ago he had a back injury at work, no repercussions. Patient states he really doesn't feel anything in the back it is in the buttock region, radiates down posterior leg to above the knee. He has N/T down left leg. No treatments except pain killers.         Rosa Zamorano    "

## 2017-10-03 NOTE — PATIENT INSTRUCTIONS
I recommend spinal injection and xray of low back. You will receive a call to schedule this.   Follow up once complete. Please call the clinic with any questions. [phone]

## 2017-10-04 NOTE — TELEPHONE ENCOUNTER
Faxed completed PA form and supporting documentation for VIDAL WONG. RIGHT FAX CONFIRMATION 10/4 AT 9:20 AM        Ryann LUEVANO    [facility]

## 2017-10-05 NOTE — TELEPHONE ENCOUNTER
Called  pain spoke to [place], he stated the care team does not do PA on pt's epidural injection,  pain has a rep that processes these PA to pt's insurance and as of right now the PA is still pending approval from [facility].  Ho Roque,  [facility]    Called spoke to pt, he is notified of the above info. Pt may call to  Pain to check the status of the PA if the PA is approved, denied or still pending.  Ho Roque,  [facility]

## 2017-10-10 NOTE — TELEPHONE ENCOUNTER
Pt is scheduled Nov. 16 th, 2017.  Closing encounter.    Sapna BOOTH    [facility] [place]

## 2017-10-10 NOTE — TELEPHONE ENCOUNTER
PA approved.  Effective date: 10/4/17-11/18/17  PA reference #: [id number]  Pt. notified:   Yes   Pt. informed directly.        Ryann LUEVANO    [facility]

## 2017-10-18 ENCOUNTER — RADIANT APPOINTMENT (OUTPATIENT)
Dept: ULTRASOUND IMAGING | Facility: CLINIC | Age: 72
End: 2017-10-18
Attending: INTERNAL MEDICINE
Payer: COMMERCIAL

## 2017-10-18 DIAGNOSIS — I70.0 ATHEROSCLEROSIS OF ABDOMINAL AORTA (H): ICD-10-CM

## 2017-10-18 PROCEDURE — 76706 US ABDL AORTA SCREEN AAA: CPT

## 2017-11-27 ENCOUNTER — OFFICE VISIT (OUTPATIENT)
Dept: URGENT CARE | Facility: URGENT CARE | Age: 72
End: 2017-11-27
Payer: COMMERCIAL

## 2017-11-27 ENCOUNTER — RADIANT APPOINTMENT (OUTPATIENT)
Dept: GENERAL RADIOLOGY | Facility: CLINIC | Age: 72
End: 2017-11-27
Attending: PHYSICIAN ASSISTANT
Payer: COMMERCIAL

## 2017-11-27 VITALS
BODY MASS INDEX: 27.14 KG/M2 | SYSTOLIC BLOOD PRESSURE: 136 MMHG | OXYGEN SATURATION: 100 % | TEMPERATURE: 97.9 F | WEIGHT: 183.8 LBS | DIASTOLIC BLOOD PRESSURE: 87 MMHG | HEART RATE: 104 BPM

## 2017-11-27 DIAGNOSIS — S20.212A RIB CONTUSION, LEFT, INITIAL ENCOUNTER: Primary | ICD-10-CM

## 2017-11-27 DIAGNOSIS — S22.32XA CLOSED FRACTURE OF ONE RIB OF LEFT SIDE, INITIAL ENCOUNTER: ICD-10-CM

## 2017-11-27 DIAGNOSIS — S27.0XXA TRAUMATIC PNEUMOTHORAX, INITIAL ENCOUNTER: ICD-10-CM

## 2017-11-27 DIAGNOSIS — S20.212A RIB CONTUSION, LEFT, INITIAL ENCOUNTER: ICD-10-CM

## 2017-11-27 PROCEDURE — 99214 OFFICE O/P EST MOD 30 MIN: CPT | Performed by: PHYSICIAN ASSISTANT

## 2017-11-27 PROCEDURE — 71101 X-RAY EXAM UNILAT RIBS/CHEST: CPT | Mod: LT

## 2017-11-27 RX ORDER — CYCLOBENZAPRINE HCL 10 MG
10 TABLET ORAL
Qty: 14 TABLET | Refills: 1 | Status: SHIPPED | OUTPATIENT
Start: 2017-11-27 | End: 2018-05-16

## 2017-11-27 RX ORDER — TRAMADOL HYDROCHLORIDE 50 MG/1
50-100 TABLET ORAL EVERY 6 HOURS PRN
Qty: 20 TABLET | Refills: 0 | OUTPATIENT
Start: 2017-11-27 | End: 2018-05-16

## 2017-11-27 NOTE — PROGRESS NOTES
SUBJECTIVE:   John Ayala is a 72 year old male who presents to clinic today for the following health issues:      Chest Pain      Duration: 5 days    Description (location/character/radiation): chest and ribs    Intensity:  moderate    Accompanying signs and symptoms: chest and rib pain    History (similar episodes/previous evaluation): None    Precipitating or alleviating factors: walking, bending    Therapies tried and outcome: advil     Getting out of his sports car Thursday. Reached back in to grab something, slipped on wet leaves. Left anterior chest fell onto door. No real pain at the time. Now hurts with bending, twisting, sleeping.  Denies SHORTNESS OF BREATH.    Allergies   Allergen Reactions     Tylenol [Acetaminophen] Shortness Of Breath, Hives and Swelling       No past medical history on file.      Current Outpatient Prescriptions on File Prior to Visit:  traMADol (ULTRAM) 50 MG tablet Take 1-2 tablets ( mg) by mouth every 8 hours as needed for pain maximum 6 tablet(s) per day   amoxicillin-clavulanate (AUGMENTIN) 875-125 MG per tablet Take 1 tablet by mouth 2 times daily   insulin glargine (BASAGLAR KWIKPEN) 100 UNIT/ML injection Inject 19 Units Subcutaneous At Bedtime   azithromycin (ZITHROMAX) 500 MG tablet Take 1 tablet (500 mg) by mouth daily   metFORMIN (GLUCOPHAGE) 1000 MG tablet Take 1 tablet (1,000 mg) by mouth 2 times daily (with meals)   aspirin 81 MG tablet Take by mouth daily   methylPREDNISolone (MEDROL DOSEPAK) 4 MG tablet Follow package instructions   ACE/ARB NOT PRESCRIBED, INTENTIONAL, Please choose reason not prescribed, below   STATIN NOT PRESCRIBED, INTENTIONAL, Please choose reason not prescribed, below   insulin pen needle 31G X 6 MM Use once daily with Lantus.   blood glucose monitoring (NO BRAND SPECIFIED) test strip Use to test blood sugars two times daily or as directed   blood glucose (NO BRAND SPECIFIED) lancets standard Use to test blood sugar two times  daily or as directed.   blood glucose (NO BRAND SPECIFIED) lancets standard Use to test blood sugar two times daily or as directed.     No current facility-administered medications on file prior to visit.     Social History   Substance Use Topics     Smoking status: Former Smoker     Types: Cigarettes     Start date: 1/1/1997     Smokeless tobacco: Never Used     Alcohol use Yes      Comment: socially       ROS:  CONSTITUTIONAL: Negative for fatigue or fever.  EYES: Negative for vision changes or eye problems.  ENT: As above.  RESP: As above.  CV: Negative for chest pains.  GI: Negative  For nausea, vomiting, constipation, diarrhea, abdominal pain.  : Negative for dysuria.  MUSCULOSKELETAL:  Negative for significant muscle or joint pains.  NEUROLOGIC: Negative for headaches, numbness, tingling, weakness.  Skin: Negative for rash.    OBJECTIVE:  /87 (BP Location: Left arm, Patient Position: Chair, Cuff Size: Adult Regular)  Pulse 104  Temp 97.9  F (36.6  C) (Oral)  Wt 183 lb 12.8 oz (83.4 kg)  SpO2 100%  BMI 27.14 kg/m2  GENERAL APPEARANCE: Healthy, alert and no distress.  EYES:Cconjunctiva/sclera clear.  EARS: No cerumen.   Ear canals w/o erythema.  TM's intact w/o erythema.    NOSE/MOUTH: Nose without ulcers, erythema or lesions.  SINUSES: No maxillary sinus tenderness.  THROAT: No erythema w/o tonsillar enlargement . No exudates.  NECK: Supple, nontender, no lymphadenopathy.  RESP: Lungs clear to auscultation - no rales, rhonchi or wheezes  CV: Regular rate and rhythm, normal S1 S2, no murmur noted.  NEURO: Awake, alert    SKIN: No rashes  Tender left mid rib cage  Abd- soft, NT, no HSM    Xray- Radiol states left 5th rib fracture, minimally displaced, and small left apical pneumothorax.    ASSESSMENT:     ICD-10-CM    1. Rib contusion, left, initial encounter S20.212A XR Ribs & Chest Left G/E 3 Views     traMADol (ULTRAM) 50 MG tablet     cyclobenzaprine (FLEXERIL) 10 MG tablet   2. Closed fracture  of one rib of left side, initial encounter S22.32XA    3. Traumatic pneumothorax, initial encounter S27.0XXA          PLAN:Disscussed with Dr. Khan. Observe. If SHORTNESS OF BREATH go to ER for possible chest tube. Otherwise f/u with PCP tomorrow for re-evaluation. Had discussion about pneumothorax and treatments.    Ashlee Jauregui PA-C

## 2017-11-27 NOTE — MR AVS SNAPSHOT
"              After Visit Summary   2017    John Ayala    MRN: [id number]           Patient Information     Date Of Birth          1945        Visit Information        Provider Department      2017 12:05 PM Ashlee Jauregui PA-C [facility]        Today's Diagnoses     Rib contusion, left, initial encounter    -  1       Follow-ups after your visit        Who to contact     If you have questions or need follow up information about today's clinic visit or your schedule please contact [facility] directly at [phone].  Normal or non-critical lab and imaging results will be communicated to you by [employer], letter or phone within 4 business days after the clinic has received the results. If you do not hear from us within 7 days, please contact the clinic through [employer] or phone. If you have a critical or abnormal lab result, we will notify you by phone as soon as possible.  Submit refill requests through [employer] or call your pharmacy and they will forward the refill request to us. Please allow 3 business days for your refill to be completed.          Additional Information About Your Visit        MyChart Information     [employer] lets you send messages to your doctor, view your test results, renew your prescriptions, schedule appointments and more. To sign up, go to [email or web address] . Click on \"Log in\" on the left side of the screen, which will take you to the Welcome page. Then click on \"Sign up Now\" on the right side of the page.     You will be asked to enter the access code listed below, as well as some personal information. Please follow the directions to create your username and password.     Your access code is: [email or web address]  Expires: 2017 10:52 AM     Your access code will  in 90 days. If you need help or a new code, please call your [facility] or [phone].        Care EveryWhere ID     This is your Care EveryWhere " ID. This could be used by other organizations to access your [facility] medical records  [id number]        Your Vitals Were     Pulse Temperature Pulse Oximetry BMI (Body Mass Index)          104 97.9  F (36.6  C) (Oral) 100% 27.14 kg/m2         Blood Pressure from Last 3 Encounters:   11/27/17 136/87   10/03/17 148/86   09/19/17 129/83    Weight from Last 3 Encounters:   11/27/17 183 lb 12.8 oz (83.4 kg)   10/03/17 183 lb 6.4 oz (83.2 kg)   09/19/17 186 lb (84.4 kg)                 Today's Medication Changes          These changes are accurate as of: 11/27/17  1:44 PM.  If you have any questions, ask your nurse or doctor.               Start taking these medicines.        Dose/Directions    cyclobenzaprine 10 MG tablet   Commonly known as:  FLEXERIL   Used for:  Rib contusion, left, initial encounter   Started by:  Ashlee Jauregui PA-C        Dose:  10 mg   Take 1 tablet (10 mg) by mouth nightly as needed for muscle spasms   Quantity:  14 tablet   Refills:  1         These medicines have changed or have updated prescriptions.        Dose/Directions    * traMADol 50 MG tablet   Commonly known as:  ULTRAM   This may have changed:  Another medication with the same name was added. Make sure you understand how and when to take each.   Used for:  Lumbar back pain with radiculopathy affecting left lower extremity   Changed by:  Vineet Franco MD        Dose:   mg   Take 1-2 tablets ( mg) by mouth every 8 hours as needed for pain maximum 6 tablet(s) per day   Quantity:  60 tablet   Refills:  2       * traMADol 50 MG tablet   Commonly known as:  ULTRAM   This may have changed:  You were already taking a medication with the same name, and this prescription was added. Make sure you understand how and when to take each.   Used for:  Rib contusion, left, initial encounter   Changed by:  Ashlee Jauregui PA-C        Dose:   mg   Take 1-2 tablets ( mg) by mouth every 6 hours as needed for  pain Maximum 5 tablet(s) per day   Quantity:  20 tablet   Refills:  0       * Notice:  This list has 2 medication(s) that are the same as other medications prescribed for you. Read the directions carefully, and ask your doctor or other care provider to review them with you.         Where to get your medicines      These medications were sent to [facility] [address] [place] [address] - [address] [facility]  [address] [facility] [address]     Phone:  [phone]     cyclobenzaprine 10 MG tablet         Some of these will need a paper prescription and others can be bought over the counter.  Ask your nurse if you have questions.     Bring a paper prescription for each of these medications     traMADol 50 MG tablet                Primary Care Provider Office Phone # Fax #    [facility] [phone]       [address]  [facility] [address]        Equal Access to Services     BEAR CORONEL : Hadii ashwin mills hadasho Soomaali, waaxda luqadaha, qaybta kaalmada adeegyada, waxay kelvinin hayaaroxie woodall . So [facility] [phone].    ATENCIÓN: Si habla español, tiene a choi disposición servicios gratuitos de asistencia lingüística. Brisa al [phone].    We comply with applicable federal civil rights laws and [place] laws. We do not discriminate on the basis of race, color, national origin, age, disability, sex, sexual orientation, or gender identity.            Thank you!     Thank you for choosing [facility]  for your care. Our goal is always to provide you with excellent care. Hearing back from our patients is one way we can continue to improve our services. Please take a few minutes to complete the written survey that you may receive in the mail after your visit with us. Thank you!             Your Updated Medication List - Protect others around you: Learn how to safely use, store and throw away your medicines at www.disposemymeds.org.           This list is accurate as of: 11/27/17  1:44 PM.  Always use your most recent med list.                   Brand Name Dispense Instructions for use Diagnosis    ACE/ARB/ARNI NOT PRESCRIBED (INTENTIONAL)      Please choose reason not prescribed, below    Uncontrolled type 2 diabetes mellitus without complication, without long-term current use of insulin (H)       amoxicillin-clavulanate 875-125 MG per tablet    AUGMENTIN    20 tablet    Take 1 tablet by mouth 2 times daily    Other acute recurrent sinusitis, Acute bronchitis, unspecified organism       aspirin 81 MG tablet      Take by mouth daily    Uncontrolled type 2 diabetes mellitus without complication, without long-term current use of insulin (H)       azithromycin 500 MG tablet    ZITHROMAX    3 tablet    Take 1 tablet (500 mg) by mouth daily    Acute recurrent frontal sinusitis       BASAGLAR 100 UNIT/ML injection     9 mL    Inject 19 Units Subcutaneous At Bedtime    Uncontrolled type 2 diabetes mellitus without complication, without long-term current use of insulin (H)       * blood glucose lancets standard    no brand specified    1 Box    Use to test blood sugar two times daily or as directed.    Uncontrolled type 2 diabetes mellitus without complication, without long-term current use of insulin (H)       * blood glucose lancets standard    no brand specified    1 Box    Use to test blood sugar two times daily or as directed.    Uncontrolled type 2 diabetes mellitus without complication, without long-term current use of insulin (H)       blood glucose monitoring test strip    no brand specified    100 strip    Use to test blood sugars two times daily or as directed    Uncontrolled type 2 diabetes mellitus without complication, without long-term current use of insulin (H)       cyclobenzaprine 10 MG tablet    FLEXERIL    14 tablet    Take 1 tablet (10 mg) by mouth nightly as needed for muscle spasms    Rib contusion, left, initial encounter       insulin  pen needle 31G X 6 MM     100 each    Use once daily with Lantus.    Uncontrolled type 2 diabetes mellitus without complication, without long-term current use of insulin (H)       metFORMIN 1000 MG tablet    GLUCOPHAGE    180 tablet    Take 1 tablet (1,000 mg) by mouth 2 times daily (with meals)    Uncontrolled type 2 diabetes mellitus without complication, without long-term current use of insulin (H)       methylPREDNISolone 4 MG tablet    MEDROL DOSEPAK    21 tablet    Follow package instructions    Acute frontal sinusitis, unspecified, Left maxillary sinusitis       STATIN NOT PRESCRIBED (INTENTIONAL)      Please choose reason not prescribed, below    Uncontrolled type 2 diabetes mellitus without complication, without long-term current use of insulin (H)       * traMADol 50 MG tablet    ULTRAM    60 tablet    Take 1-2 tablets ( mg) by mouth every 8 hours as needed for pain maximum 6 tablet(s) per day    Lumbar back pain with radiculopathy affecting left lower extremity       * traMADol 50 MG tablet    ULTRAM    20 tablet    Take 1-2 tablets ( mg) by mouth every 6 hours as needed for pain Maximum 5 tablet(s) per day    Rib contusion, left, initial encounter       * Notice:  This list has 4 medication(s) that are the same as other medications prescribed for you. Read the directions carefully, and ask your doctor or other care provider to review them with you.

## 2017-11-27 NOTE — NURSING NOTE
"Chief Complaint   Patient presents with     Chest Pain     Patient complains of chest and rib pain after falling into a car door       Initial /87 (BP Location: Left arm, Patient Position: Chair, Cuff Size: Adult Regular)  Pulse 104  Temp 97.9  F (36.6  C) (Oral)  Wt 183 lb 12.8 oz (83.4 kg)  SpO2 100%  BMI 27.14 kg/m2 Estimated body mass index is 27.14 kg/(m^2) as calculated from the following:    Height as of 10/3/17: 5' 9\" (1.753 m).    Weight as of this encounter: 183 lb 12.8 oz (83.4 kg).  Medication Reconciliation: complete       Leisa Tran    "

## 2017-11-28 ENCOUNTER — OFFICE VISIT (OUTPATIENT)
Dept: FAMILY MEDICINE | Facility: CLINIC | Age: 72
End: 2017-11-28
Payer: COMMERCIAL

## 2017-11-28 ENCOUNTER — RADIANT APPOINTMENT (OUTPATIENT)
Dept: GENERAL RADIOLOGY | Facility: CLINIC | Age: 72
End: 2017-11-28
Attending: INTERNAL MEDICINE
Payer: COMMERCIAL

## 2017-11-28 VITALS
HEART RATE: 105 BPM | WEIGHT: 183 LBS | BODY MASS INDEX: 27.02 KG/M2 | DIASTOLIC BLOOD PRESSURE: 90 MMHG | TEMPERATURE: 96.8 F | SYSTOLIC BLOOD PRESSURE: 158 MMHG | OXYGEN SATURATION: 97 %

## 2017-11-28 DIAGNOSIS — S22.32XS CLOSED FRACTURE OF ONE RIB OF LEFT SIDE, SEQUELA: ICD-10-CM

## 2017-11-28 DIAGNOSIS — R91.8 PULMONARY NODULES: ICD-10-CM

## 2017-11-28 DIAGNOSIS — J93.9 PNEUMOTHORAX ON LEFT: Primary | ICD-10-CM

## 2017-11-28 PROCEDURE — 99214 OFFICE O/P EST MOD 30 MIN: CPT | Performed by: INTERNAL MEDICINE

## 2017-11-28 PROCEDURE — 71101 X-RAY EXAM UNILAT RIBS/CHEST: CPT | Mod: LT

## 2017-11-28 RX ORDER — HYDROCODONE BITARTRATE AND IBUPROFEN 7.5; 2 MG/1; MG/1
1 TABLET, FILM COATED ORAL
Qty: 30 TABLET | Refills: 0 | Status: SHIPPED | OUTPATIENT
Start: 2017-11-28 | End: 2018-05-16

## 2017-11-28 ASSESSMENT — PAIN SCALES - GENERAL: PAINLEVEL: SEVERE PAIN (7)

## 2017-11-28 NOTE — NURSING NOTE
"Chief Complaint   Patient presents with     Urgent Care       Initial BP (!) 173/97 (BP Location: Left arm, Patient Position: Sitting, Cuff Size: Adult Regular)  Pulse 105  Temp 96.8  F (36  C) (Oral)  Wt 183 lb (83 kg)  SpO2 97%  BMI 27.02 kg/m2 Estimated body mass index is 27.02 kg/(m^2) as calculated from the following:    Height as of 10/3/17: 5' 9\" (1.753 m).    Weight as of this encounter: 183 lb (83 kg).  Medication Reconciliation: complete   Don TORRE      "

## 2017-11-28 NOTE — PROGRESS NOTES
SUBJECTIVE:   John Ayala is a 72 year old male who presents to clinic today for the following health issues:        Joint or Musculoskeletal Pain  Duration of complaint:  6 days.  Description:   Location: left ribs  Character: Sharp  Intensity: moderate, severe  Progression of Symptoms: same  Accompanying Signs & Symptoms: dyspnea.  History: None prior to Urgent Care visit yesterday.  X-rays show left 5th rib fracture and small apical pneumothorax.  Precipitating factors: Trauma: YES. Slipped and fell on a car door while standing on a slippery ground full of wet leaves.  Alleviating factors: Improved by: nothing  Therapies Tried and outcome: Tramadol and Flexeril (not effective)        Problem list and histories reviewed & adjusted, as indicated.  Additional history: as documented    Patient Active Problem List   Diagnosis     Obstructive sleep apnea     Uncontrolled type 2 diabetes mellitus without complication, without long-term current use of insulin (H)     Overweight (BMI 25.0-29.9)     Calcified granuloma of lung (H)     Pulmonary nodules     Fatty liver disease, nonalcoholic     No past surgical history on file.    Social History   Substance Use Topics     Smoking status: Former Smoker     Types: Cigarettes     Start date: 1/1/1997     Smokeless tobacco: Never Used     Alcohol use Yes      Comment: socially     No family history on file.      Allergies   Allergen Reactions     Tylenol [Acetaminophen] Shortness Of Breath, Hives and Swelling     Recent Labs   Lab Test  08/31/17   1114  05/16/17   1610  05/16/17   1454   A1C  6.3*  13.4*   --    LDL   --    --   133*   HDL   --    --   48   TRIG   --    --   106   ALT  25  33   --    CR  0.85  0.95   --    GFRESTIMATED  89  79   --    GFRESTBLACK  >90  >90  African American GFR Calc     --    POTASSIUM  5.4*  4.2   --       BP Readings from Last 3 Encounters:   11/28/17 158/90   11/27/17 136/87   10/03/17 148/86    Wt Readings from Last 3 Encounters:    11/28/17 183 lb (83 kg)   11/27/17 183 lb 12.8 oz (83.4 kg)   10/03/17 183 lb 6.4 oz (83.2 kg)                    ROS:  C: NEGATIVE for fever, chills, change in weight  I: NEGATIVE for worrisome rashes, moles or lesions  E: NEGATIVE for vision changes or irritation  E/M: NEGATIVE for ear, mouth and throat problems  R: NEGATIVE for significant cough or SOB  CV: NEGATIVE for chest pain, palpitations or peripheral edema  GI: NEGATIVE for nausea, abdominal pain, heartburn, or change in bowel habits  : NEGATIVE for frequency, dysuria, or hematuria  M: NEGATIVE for significant arthralgias or myalgia  N: NEGATIVE for weakness, dizziness or paresthesias  E: NEGATIVE for temperature intolerance, skin/hair changes  H: NEGATIVE for bleeding problems  P: NEGATIVE for changes in mood or affect    OBJECTIVE:     /90  Pulse 105  Temp 96.8  F (36  C) (Oral)  Wt 183 lb (83 kg)  SpO2 97%  BMI 27.02 kg/m2  Body mass index is 27.02 kg/(m^2).  GENERAL: healthy, alert and no distress  EYES: Eyes grossly normal to inspection, PERRL and conjunctivae and sclerae normal  HENT: ear canals and TM's normal, nose and mouth without ulcers or lesions  NECK: no adenopathy, no asymmetry, masses, or scars and thyroid normal to palpation  RESP: Tenderness on palpation of left anterior at midclavicular line below left nipple.  CV: regular rate and rhythm, normal S1 S2, no S3 or S4, no murmur, click or rub, no peripheral edema and peripheral pulses strong  ABDOMEN: soft, nontender, no hepatosplenomegaly, no masses and bowel sounds normal  MS: no gross musculoskeletal defects noted, no edema  SKIN: no suspicious lesions or rashes  NEURO: Normal strength and tone, mentation intact and speech normal  PSYCH: mentation appears normal, affect normal/bright    Diagnostic Test Results:  Results for orders placed or performed in visit on 11/28/17   XR Ribs & Chest Left G/E 3 Views    Narrative    XR RIBS & CHEST LT 3VW 11/28/2017 4:09  PM    HISTORY: Pneumothorax.    COMPARISON: November 27, 2017.      Impression    IMPRESSION: No significant change from prior examination. Stable small  left apical pneumothorax with fractures of the left fourth and fifth  ribs. No new focal pulmonary opacities. Stable heart and mediastinum.  The right lung remains clear.    MARIBETH MELLO MD       ASSESSMENT/PLAN:       (J93.9) Pneumothorax on left  (primary encounter diagnosis)  Comment:   Plan: XR Ribs & Chest Left G/E 3 Views            (S22.32XS) Closed fracture of one rib of left side, sequela  Comment:   Plan: XR Ribs & Chest Left G/E 3 Views,         HYDROcodone-ibuprofen (VICOPROFEN) 7.5-200 MG         per tablet            (R91.8) Pulmonary nodules  Comment:   Plan: CT Chest Low Dose Non Contrast            Follow-up visit if condition worsens.      Vineet Franco MD  [facility]

## 2017-11-28 NOTE — MR AVS SNAPSHOT
After Visit Summary   11/28/2017    John Ayala    MRN: [id number]           Patient Information     Date Of Birth          1945        Visit Information        Provider Department      11/28/2017 3:00 PM Vineet Franco MD [facility]        Today's Diagnoses     Pneumothorax on left    -  1    Closed fracture of one rib of left side, sequela        Pulmonary nodules          Care Instructions    [facility] experience to you, every time we see you.  If you receive a survey in the mail, please send us back your thoughts. We really do value your feedback.    Based on your medical history, these are the current health maintenance/preventive care services that you are due for (some may have been done at this visit.)  Health Maintenance Due   Topic Date Due     FOOT EXAM Q1 YEAR  01/26/1946     EYE EXAM Q1 YEAR  01/26/1946     TSH W/ FREE T4 REFLEX Q2 YEAR  01/26/1946     TETANUS IMMUNIZATION (SYSTEM ASSIGNED)  01/26/1965     HEPATITIS C SCREENING  01/26/1965     COLON CANCER SCREEN (SYSTEM ASSIGNED)  01/26/1997     ADVANCE DIRECTIVE PLANNING Q5 YRS  01/26/2002     INFLUENZA VACCINE (SYSTEM ASSIGNED)  09/01/2017         Suggested websites for health information:  Www.Fabule.TheLadders : Up to date and easily searchable information on multiple topics.  Www.medlineplus.gov : medication info, interactive tutorials, watch real surgeries online  Www.familydoctor.org : good info from the Academy of Family Physicians  Www.cdc.gov : public health info, travel advisories, epidemics (H1N1)  Www.aap.org : children's health info, normal development, vaccinations  Www.health.[facility].[place].us : [place] dept of health, public health issues in [place], N1N1    Your care team:                            Family Medicine Internal Medicine   MD Vineet Munguia MD Shantel Branch-Fleming, MD Katya Georgiev PA-C Nam Ho, MD  "Pediatrics   YELENA Blanco, TREVER Oneil APRN CNP   MD Nadia Barrera MD Deborah Mielke, MD Kim Thein, YOMAIRA [facility]      Clinic hours: Monday - Thursday 7 am-7 pm; Fridays 7 am-5 pm.   Urgent care: Monday - Friday 11 am-9 pm; Saturday and Sunday 9 am-5 pm.  Pharmacy : Monday -Thursday 8 am-8 pm; Friday 8 am-6 pm; Saturday and Sunday 9 am-5 pm.     Clinic: [phone]   Pharmacy: [phone]            Follow-ups after your visit        Future tests that were ordered for you today     Open Future Orders        Priority Expected Expires Ordered    CT Chest Low Dose Non Contrast Routine 12/12/2017 11/28/2018 11/28/2017            Who to contact     If you have questions or need follow up information about today's clinic visit or your schedule please contact [facility] directly at [phone].  Normal or non-critical lab and imaging results will be communicated to you by Yemeksepetihart, letter or phone within 4 business days after the clinic has received the results. If you do not hear from us within 7 days, please contact the clinic through Yemeksepetihart or phone. If you have a critical or abnormal lab result, we will notify you by phone as soon as possible.  Submit refill requests through HapBoo or call your pharmacy and they will forward the refill request to us. Please allow 3 business days for your refill to be completed.          Additional Information About Your Visit        HapBoo Information     HapBoo lets you send messages to your doctor, view your test results, renew your prescriptions, schedule appointments and more. To sign up, go to [email or web address] . Click on \"Log in\" on the left side of the screen, which will take you to the Welcome page. Then click on \"Sign up Now\" on the right side of the page.     You will be asked to enter the access code listed below, as well as some personal information. Please follow the directions to create " your username and password.     Your access code is: [email or web address]  Expires: 2017 10:52 AM     Your access code will  in 90 days. If you need help or a new code, please call your [facility] clinic or [phone].        Care EveryWhere ID     This is your Care EveryWhere ID. This could be used by other organizations to access your [facility] medical records  [id number]        Your Vitals Were     Pulse Temperature Pulse Oximetry BMI (Body Mass Index)          105 96.8  F (36  C) (Oral) 97% 27.02 kg/m2         Blood Pressure from Last 3 Encounters:   17 158/90   17 136/87   10/03/17 148/86    Weight from Last 3 Encounters:   17 183 lb (83 kg)   17 183 lb 12.8 oz (83.4 kg)   10/03/17 183 lb 6.4 oz (83.2 kg)              We Performed the Following     XR Ribs & Chest Left G/E 3 Views          Today's Medication Changes          These changes are accurate as of: 17  4:25 PM.  If you have any questions, ask your nurse or doctor.               Start taking these medicines.        Dose/Directions    HYDROcodone-ibuprofen 7.5-200 MG per tablet   Commonly known as:  VICOPROFEN   Used for:  Closed fracture of one rib of left side, sequela   Started by:  Vineet Franco MD        Dose:  1 tablet   Take 1 tablet by mouth nightly as needed for moderate to severe pain   Quantity:  30 tablet   Refills:  0         These medicines have changed or have updated prescriptions.        Dose/Directions    traMADol 50 MG tablet   Commonly known as:  ULTRAM   This may have changed:  Another medication with the same name was removed. Continue taking this medication, and follow the directions you see here.   Used for:  Rib contusion, left, initial encounter   Changed by:  Ashlee Jauregui PA-C        Dose:   mg   Take 1-2 tablets ( mg) by mouth every 6 hours as needed for pain Maximum 5 tablet(s) per day   Quantity:  20 tablet   Refills:  0            Where to get your medicines       Some of these will need a paper prescription and others can be bought over the counter.  Ask your nurse if you have questions.     Bring a paper prescription for each of these medications     HYDROcodone-ibuprofen 7.5-200 MG per tablet                Primary Care Provider Office Phone # Fax #    [facility] [phone] [address]  [facility] [address]        Equal Access to Services     BEAR CORONEL : Hadii aad ku hadasho Soomaali, waaxda luqadaha, qaybta kaalmada adeegyada, waxay idiin hayaan adeeg kharash lawallace . So [facility] [phone].    ATENCIÓN: Si leannla lili, tiene a choi disposición servicios gratuitos de asistencia lingüística. Llame al [phone].    We comply with applicable federal civil rights laws and [place] laws. We do not discriminate on the basis of race, color, national origin, age, disability, sex, sexual orientation, or gender identity.            Thank you!     Thank you for choosing [facility]  for your care. Our goal is always to provide you with excellent care. Hearing back from our patients is one way we can continue to improve our services. Please take a few minutes to complete the written survey that you may receive in the mail after your visit with us. Thank you!             Your Updated Medication List - Protect others around you: Learn how to safely use, store and throw away your medicines at www.disposemymeds.org.          This list is accurate as of: 11/28/17  4:25 PM.  Always use your most recent med list.                   Brand Name Dispense Instructions for use Diagnosis    ACE/ARB/ARNI NOT PRESCRIBED (INTENTIONAL)      Please choose reason not prescribed, below    Uncontrolled type 2 diabetes mellitus without complication, without long-term current use of insulin (H)       amoxicillin-clavulanate 875-125 MG per tablet    AUGMENTIN    20 tablet    Take 1 tablet by mouth 2 times daily    Other acute recurrent  sinusitis, Acute bronchitis, unspecified organism       aspirin 81 MG tablet      Take by mouth daily    Uncontrolled type 2 diabetes mellitus without complication, without long-term current use of insulin (H)       azithromycin 500 MG tablet    ZITHROMAX    3 tablet    Take 1 tablet (500 mg) by mouth daily    Acute recurrent frontal sinusitis       BASAGLAR 100 UNIT/ML injection     9 mL    Inject 19 Units Subcutaneous At Bedtime    Uncontrolled type 2 diabetes mellitus without complication, without long-term current use of insulin (H)       * blood glucose lancets standard    no brand specified    1 Box    Use to test blood sugar two times daily or as directed.    Uncontrolled type 2 diabetes mellitus without complication, without long-term current use of insulin (H)       * blood glucose lancets standard    no brand specified    1 Box    Use to test blood sugar two times daily or as directed.    Uncontrolled type 2 diabetes mellitus without complication, without long-term current use of insulin (H)       blood glucose monitoring test strip    no brand specified    100 strip    Use to test blood sugars two times daily or as directed    Uncontrolled type 2 diabetes mellitus without complication, without long-term current use of insulin (H)       cyclobenzaprine 10 MG tablet    FLEXERIL    14 tablet    Take 1 tablet (10 mg) by mouth nightly as needed for muscle spasms    Rib contusion, left, initial encounter       HYDROcodone-ibuprofen 7.5-200 MG per tablet    VICOPROFEN    30 tablet    Take 1 tablet by mouth nightly as needed for moderate to severe pain    Closed fracture of one rib of left side, sequela       insulin pen needle 31G X 6 MM     100 each    Use once daily with Lantus.    Uncontrolled type 2 diabetes mellitus without complication, without long-term current use of insulin (H)       metFORMIN 1000 MG tablet    GLUCOPHAGE    180 tablet    Take 1 tablet (1,000 mg) by mouth 2 times daily (with meals)     Uncontrolled type 2 diabetes mellitus without complication, without long-term current use of insulin (H)       methylPREDNISolone 4 MG tablet    MEDROL DOSEPAK    21 tablet    Follow package instructions    Acute frontal sinusitis, unspecified, Left maxillary sinusitis       STATIN NOT PRESCRIBED (INTENTIONAL)      Please choose reason not prescribed, below    Uncontrolled type 2 diabetes mellitus without complication, without long-term current use of insulin (H)       traMADol 50 MG tablet    ULTRAM    20 tablet    Take 1-2 tablets ( mg) by mouth every 6 hours as needed for pain Maximum 5 tablet(s) per day    Rib contusion, left, initial encounter       * Notice:  This list has 2 medication(s) that are the same as other medications prescribed for you. Read the directions carefully, and ask your doctor or other care provider to review them with you.

## 2017-11-28 NOTE — PATIENT INSTRUCTIONS
At [facility], we strive to deliver an exceptional experience to you, every time we see you.  If you receive a survey in the mail, please send us back your thoughts. We really do value your feedback.    Based on your medical history, these are the current health maintenance/preventive care services that you are due for (some may have been done at this visit.)  Health Maintenance Due   Topic Date Due     FOOT EXAM Q1 YEAR  01/26/1946     EYE EXAM Q1 YEAR  01/26/1946     TSH W/ FREE T4 REFLEX Q2 YEAR  01/26/1946     TETANUS IMMUNIZATION (SYSTEM ASSIGNED)  01/26/1965     HEPATITIS C SCREENING  01/26/1965     COLON CANCER SCREEN (SYSTEM ASSIGNED)  01/26/1997     ADVANCE DIRECTIVE PLANNING Q5 YRS  01/26/2002     INFLUENZA VACCINE (SYSTEM ASSIGNED)  09/01/2017         Suggested websites for health information:  Www.[facility] Form.StayTuned : Up to date and easily searchable information on multiple topics.  Www.[employer].gov : medication info, interactive tutorials, watch real surgeries online  Www.familydoctor.org : good info from the Academy of Family Physicians  Www.cdc.gov : public health info, travel advisories, epidemics (H1N1)  Www.aap.org : children's health info, normal development, vaccinations  Www.health.[facility].[place].us : [place] dept of health, public health issues in [place], N1N1    Your care team:                            Family Medicine Internal Medicine   MD Vineet Munguia MD Shantel Branch-Fleming, MD Katya Georgiev PA-C Nam Ho, MD Pediatrics   YELENA Blanco, MD Nadia Dave CNP, MD Deborah Mielke, MD Kim Thein, APRN CNP      Clinic hours: Monday - Thursday 7 am-7 pm; Fridays 7 am-5 pm.   Urgent care: Monday - Friday 11 am-9 pm; Saturday and Sunday 9 am-5 pm.  Pharmacy : Monday -Thursday 8 am-8 pm; Friday 8 am-6 pm; Saturday and Sunday 9 am-5 pm.     Clinic: [phone]   Pharmacy: [phone]

## 2017-12-14 ENCOUNTER — RADIANT APPOINTMENT (OUTPATIENT)
Dept: CT IMAGING | Facility: CLINIC | Age: 72
End: 2017-12-14
Attending: INTERNAL MEDICINE
Payer: COMMERCIAL

## 2017-12-14 DIAGNOSIS — R91.8 PULMONARY NODULES: ICD-10-CM

## 2017-12-14 LAB — RADIOLOGIST FLAGS: NORMAL

## 2017-12-14 PROCEDURE — 71250 CT THORAX DX C-: CPT | Performed by: RADIOLOGY

## 2018-04-09 ENCOUNTER — TELEPHONE (OUTPATIENT)
Dept: FAMILY MEDICINE | Facility: CLINIC | Age: 73
End: 2018-04-09

## 2018-04-09 NOTE — LETTER
April 9, 2018      John Ayala  [address]  [place] [address]          Dear John Ayala,      At [facility] we care about your health and are committed to providing quality patient care, which includes staying current on preventative cancer screenings.  You can increase your chances of finding and treating cancers through regular screenings.      Our records show that you are due for the following screening(s):    Colonoscopy for colon cancer  Specialty Schedule Number [phone]  Recommended every ten years for everyone age 50 and older  We strongly urge our patient's to consider having a colonoscopy done, which is the best screening test available and only needs to be done every 10 years if normal.      Other option is that you can do a FIT and this is once a year.  Any questions or concern, please contact us at [phone].    You may contact the closest location to schedule the screening test(s) at your earliest convenience.    If you have already had one or all of the above screening tests at another facility, please call us so that we may update your chart.      Sincerely,         Vineet Franco MD/ SCAR  [facility]

## 2018-04-09 NOTE — TELEPHONE ENCOUNTER
Panel Management Review      BP Readings from Last 1 Encounters:   11/28/17 158/90      Last Office Visit with this department: 11/28/2017    Fail List measure:       Patient is due/failing the following:   COLONOSCOPY    Action needed:   Patient needs office visit for COLONOSCOPY.    Type of outreach:    Sent letter.    Questions for provider review:    None                                                                                                                                    Torito Brown, CMA

## 2018-04-27 NOTE — TELEPHONE ENCOUNTER
"Requested Prescriptions   Pending Prescriptions Disp Refills     metFORMIN (GLUCOPHAGE) 1000 MG tablet  Last Written Prescription Date:  03/02/18  Last Fill Quantity: 120,  # refills: 0   Last Office Visit with G, P or [facility] prescribing provider:  11/28/17   Future Office Visit:    120 tablet 0     Sig: Take 1 tablet (1,000 mg) by mouth 2 times daily (with meals)    Biguanide Agents Failed    4/27/2018  1:35 PM       Failed - Blood pressure less than 140/90 in past 6 months    BP Readings from Last 3 Encounters:   11/28/17 158/90   11/27/17 136/87   10/03/17 148/86                Failed - Patient has documented A1c within the specified period of time.    Recent Labs   Lab Test  08/31/17   1114   A1C  6.3*            Passed - Patient has documented LDL within the past 12 mos.    Recent Labs   Lab Test  05/16/17   1454   LDL  133*            Passed - Patient has had a Microalbumin in the past 12 mos.    Recent Labs   Lab Test  05/16/17   1604   MICROL  1730   UMALCR  1067.90*            Passed - Patient is age 10 or older       Passed - Patient's CR is NOT>1.4 OR Patient's EGFR is NOT<45 within past 12 mos.    Recent Labs   Lab Test  08/31/17   1114   GFRESTIMATED  89   GFRESTBLACK  >90       Recent Labs   Lab Test  08/31/17   1114   CR  0.85            Passed - Patient does NOT have a diagnosis of CHF.       Passed - Recent (6 mo) or future (30 days) visit within the authorizing provider's specialty    Patient had office visit in the last 6 months or has a visit in the next 30 days with authorizing provider or within the authorizing provider's specialty.  See \"Patient Info\" tab in inbasket, or \"Choose Columns\" in Meds & Orders section of the refill encounter.              "

## 2018-04-27 NOTE — TELEPHONE ENCOUNTER
....Reason for Call:  prescription    Detailed comments: Patient called requesting a refill on METFORMIN     Phone Number Patient can be reached at: Home number on file [phone] (home)    Best Time: ANYTIME    Can we leave a detailed message on this number? YES    Call taken on 4/27/2018 at 12:14 PM by Jane Olivo

## 2018-05-01 NOTE — TELEPHONE ENCOUNTER
Patient contacted. He states is currently driving and will call back to make appointment  Kevin Mayorga CMA

## 2018-05-01 NOTE — TELEPHONE ENCOUNTER
Team please contact patient and assist with making appointment. Per medication detail (see below) last refill stated no further  refills until office visit.  Please route to provider after for review after scheduling appointment.  Please noted end date states 5/1/18 on medication detail.  Keyla Mcgraw RN

## 2018-05-16 ENCOUNTER — RADIANT APPOINTMENT (OUTPATIENT)
Dept: GENERAL RADIOLOGY | Facility: CLINIC | Age: 73
End: 2018-05-16
Attending: INTERNAL MEDICINE
Payer: COMMERCIAL

## 2018-05-16 ENCOUNTER — OFFICE VISIT (OUTPATIENT)
Dept: FAMILY MEDICINE | Facility: CLINIC | Age: 73
End: 2018-05-16
Payer: COMMERCIAL

## 2018-05-16 VITALS
TEMPERATURE: 98.6 F | RESPIRATION RATE: 16 BRPM | HEIGHT: 69 IN | OXYGEN SATURATION: 96 % | HEART RATE: 92 BPM | BODY MASS INDEX: 28.73 KG/M2 | WEIGHT: 194 LBS | SYSTOLIC BLOOD PRESSURE: 169 MMHG | DIASTOLIC BLOOD PRESSURE: 91 MMHG

## 2018-05-16 DIAGNOSIS — E78.5 HYPERLIPIDEMIA LDL GOAL <100: ICD-10-CM

## 2018-05-16 DIAGNOSIS — R10.11 RIGHT UPPER QUADRANT PAIN: ICD-10-CM

## 2018-05-16 DIAGNOSIS — Z00.00 ROUTINE GENERAL MEDICAL EXAMINATION AT A HEALTH CARE FACILITY: Primary | ICD-10-CM

## 2018-05-16 DIAGNOSIS — Z79.4 CONTROLLED TYPE 2 DIABETES MELLITUS WITHOUT COMPLICATION, WITH LONG-TERM CURRENT USE OF INSULIN (H): ICD-10-CM

## 2018-05-16 DIAGNOSIS — I10 HYPERTENSION GOAL BP (BLOOD PRESSURE) < 140/90: ICD-10-CM

## 2018-05-16 DIAGNOSIS — R19.5 POSITIVE FIT (FECAL IMMUNOCHEMICAL TEST): ICD-10-CM

## 2018-05-16 DIAGNOSIS — E11.9 CONTROLLED TYPE 2 DIABETES MELLITUS WITHOUT COMPLICATION, WITH LONG-TERM CURRENT USE OF INSULIN (H): ICD-10-CM

## 2018-05-16 DIAGNOSIS — Z12.11 SCREEN FOR COLON CANCER: ICD-10-CM

## 2018-05-16 LAB
ALBUMIN SERPL-MCNC: 4 G/DL (ref 3.4–5)
ALP SERPL-CCNC: 99 U/L (ref 40–150)
ALT SERPL W P-5'-P-CCNC: 25 U/L (ref 0–70)
ANION GAP SERPL CALCULATED.3IONS-SCNC: 9 MMOL/L (ref 3–14)
AST SERPL W P-5'-P-CCNC: 23 U/L (ref 0–45)
BASOPHILS # BLD AUTO: 0 10E9/L (ref 0–0.2)
BASOPHILS NFR BLD AUTO: 0.3 %
BILIRUB SERPL-MCNC: 0.5 MG/DL (ref 0.2–1.3)
BUN SERPL-MCNC: 18 MG/DL (ref 7–30)
CALCIUM SERPL-MCNC: 9.3 MG/DL (ref 8.5–10.1)
CHLORIDE SERPL-SCNC: 107 MMOL/L (ref 94–109)
CHOLEST SERPL-MCNC: 235 MG/DL
CO2 SERPL-SCNC: 26 MMOL/L (ref 20–32)
CREAT SERPL-MCNC: 0.73 MG/DL (ref 0.66–1.25)
CREAT UR-MCNC: 273 MG/DL
DIFFERENTIAL METHOD BLD: NORMAL
EOSINOPHIL # BLD AUTO: 0.1 10E9/L (ref 0–0.7)
EOSINOPHIL NFR BLD AUTO: 1.5 %
ERYTHROCYTE [DISTWIDTH] IN BLOOD BY AUTOMATED COUNT: 13.1 % (ref 10–15)
GFR SERPL CREATININE-BSD FRML MDRD: >90 ML/MIN/1.7M2
GLUCOSE SERPL-MCNC: 144 MG/DL (ref 70–99)
HBA1C MFR BLD: 7.3 % (ref 0–5.6)
HCT VFR BLD AUTO: 47.8 % (ref 40–53)
HDLC SERPL-MCNC: 47 MG/DL
HGB BLD-MCNC: 16 G/DL (ref 13.3–17.7)
LDLC SERPL CALC-MCNC: 162 MG/DL
LYMPHOCYTES # BLD AUTO: 1.5 10E9/L (ref 0.8–5.3)
LYMPHOCYTES NFR BLD AUTO: 20.5 %
MCH RBC QN AUTO: 31.4 PG (ref 26.5–33)
MCHC RBC AUTO-ENTMCNC: 33.5 G/DL (ref 31.5–36.5)
MCV RBC AUTO: 94 FL (ref 78–100)
MICROALBUMIN UR-MCNC: 530 MG/L
MICROALBUMIN/CREAT UR: 194.14 MG/G CR (ref 0–17)
MONOCYTES # BLD AUTO: 0.5 10E9/L (ref 0–1.3)
MONOCYTES NFR BLD AUTO: 6.5 %
NEUTROPHILS # BLD AUTO: 5.2 10E9/L (ref 1.6–8.3)
NEUTROPHILS NFR BLD AUTO: 71.2 %
NONHDLC SERPL-MCNC: 188 MG/DL
PLATELET # BLD AUTO: 186 10E9/L (ref 150–450)
POTASSIUM SERPL-SCNC: 4.4 MMOL/L (ref 3.4–5.3)
PROT SERPL-MCNC: 7.6 G/DL (ref 6.8–8.8)
RBC # BLD AUTO: 5.1 10E12/L (ref 4.4–5.9)
SODIUM SERPL-SCNC: 142 MMOL/L (ref 133–144)
TRIGL SERPL-MCNC: 130 MG/DL
WBC # BLD AUTO: 7.4 10E9/L (ref 4–11)

## 2018-05-16 PROCEDURE — 83036 HEMOGLOBIN GLYCOSYLATED A1C: CPT | Performed by: INTERNAL MEDICINE

## 2018-05-16 PROCEDURE — 80053 COMPREHEN METABOLIC PANEL: CPT | Performed by: INTERNAL MEDICINE

## 2018-05-16 PROCEDURE — G0438 PPPS, INITIAL VISIT: HCPCS | Performed by: INTERNAL MEDICINE

## 2018-05-16 PROCEDURE — 36415 COLL VENOUS BLD VENIPUNCTURE: CPT | Performed by: INTERNAL MEDICINE

## 2018-05-16 PROCEDURE — 85025 COMPLETE CBC W/AUTO DIFF WBC: CPT | Performed by: INTERNAL MEDICINE

## 2018-05-16 PROCEDURE — 99213 OFFICE O/P EST LOW 20 MIN: CPT | Mod: 25 | Performed by: INTERNAL MEDICINE

## 2018-05-16 PROCEDURE — 74019 RADEX ABDOMEN 2 VIEWS: CPT | Mod: FY

## 2018-05-16 PROCEDURE — 80061 LIPID PANEL: CPT | Performed by: INTERNAL MEDICINE

## 2018-05-16 PROCEDURE — 82043 UR ALBUMIN QUANTITATIVE: CPT | Performed by: INTERNAL MEDICINE

## 2018-05-16 ASSESSMENT — PAIN SCALES - GENERAL: PAINLEVEL: NO PAIN (0)

## 2018-05-16 NOTE — PATIENT INSTRUCTIONS
At [facility], we strive to deliver an exceptional experience to you, every time we see you.  If you receive a survey in the mail, please send us back your thoughts. We really do value your feedback.    Based on your medical history, these are the current health maintenance/preventive care services that you are due for (some may have been done at this visit.)  Health Maintenance Due   Topic Date Due     FOOT EXAM Q1 YEAR  01/26/1946     EYE EXAM Q1 YEAR  01/26/1946     TSH W/ FREE T4 REFLEX Q2 YEAR  01/26/1946     TETANUS IMMUNIZATION (SYSTEM ASSIGNED)  01/26/1965     HEPATITIS C SCREENING  01/26/1965     COLON CANCER SCREEN (SYSTEM ASSIGNED)  01/26/1997     ADVANCE DIRECTIVE PLANNING Q5 YRS  01/26/2002     A1C Q6 MO  02/28/2018     LIPID MONITORING Q1 YEAR  05/16/2018     MICROALBUMIN Q1 YEAR  05/16/2018     PNEUMOCOCCAL (2 of 2 - PPSV23) 05/16/2018         Suggested websites for health information:  Www.[facility] [employer].ROCKI : Up to date and easily searchable information on multiple topics.  Www.medlineplus.gov : medication info, interactive tutorials, watch real surgeries online  Www.familydoctor.org : good info from the Academy of Family Physicians  Www.cdc.gov : public health info, travel advisories, epidemics (H1N1)  Www.aap.org : children's health info, normal development, vaccinations  Www.health.state.[place].us : [place] dept of health, public health issues in [place], N1N1    Your care team:                            Family Medicine Internal Medicine   MD Vineet Munguia MD Shantel Branch-Fleming, MD Katya Georgiev PA-C Nam Ho, MD Pediatrics   YELENA Blanco, MD Nadia Dave CNP, MD Deborah Mielke, MD Kim Thein, APRN CNP      Clinic hours: Monday - Thursday 7 am-7 pm; Fridays 7 am-5 pm.   Urgent care: Monday - Friday 11 am-9 pm; Saturday and Sunday 9 am-5 pm.  Pharmacy : Monday -Thursday 8 am-8 pm; Friday 8 am-6 pm;  Saturday and Sunday 9 am-5 pm.     Clinic: [phone]   Pharmacy: [phone]      Preventive Health Recommendations:   Male Ages 65 and over    Yearly exam:             See your health care provider every year in order to  o   Review health changes.   o   Discuss preventive care.    o   Review your medicines if your doctor has prescribed any.    Talk with your health care provider about whether you should have a test to screen for prostate cancer (PSA).    Every 3 years, have a diabetes test (fasting glucose). If you are at risk for diabetes, you should have this test more often.    Every 5 years, have a cholesterol test. Have this test more often if you are at risk for high cholesterol or heart disease.     Every 10 years, have a colonoscopy. Or, have a yearly FIT test (stool test). These exams will check for colon cancer.    Talk to with your health care provider about screening for Abdominal Aortic Aneurysm if you have a family history of AAA or have a history of smoking.    Shots:     Get a flu shot each year.     Get a tetanus shot every 10 years.     Talk to your doctor about your pneumonia vaccines. There are now two you should receive - Pneumovax (PPSV 23) and Prevnar (PCV 13).     Talk to your doctor about a shingles vaccine.     Talk to your doctor about the hepatitis B vaccine.  Nutrition:     Eat at least 5 servings of fruits and vegetables each day.     Eat whole-grain bread, whole-wheat pasta and brown rice instead of white grains and rice.     Talk to your provider about Calcium and Vitamin D.   Lifestyle    Exercise for at least 150 minutes a week (30 minutes a day, 5 days a week). This will help you control your weight and prevent disease.     Limit alcohol to one drink per day.     No smoking.     Wear sunscreen to prevent skin cancer.     See your dentist every six months for an exam and cleaning.     See your eye doctor every 1 to 2 years to screen for conditions such as glaucoma, macular  degeneration, cataracts, etc

## 2018-05-16 NOTE — NURSING NOTE
Composite Cancer Quality Initiative    Colon Cancer Initiative satisfied?  No, patient is unsure if had FIT done or not     Updated by: Torito Brown MA

## 2018-05-16 NOTE — PROGRESS NOTES
SUBJECTIVE:   John Ayala is a 73 year old male who presents for Preventive Visit.    Are you in the first 12 months of your Medicare Part B coverage?  No    Healthy Habits:    Do you get at least three servings of calcium containing foods daily (dairy, green leafy vegetables, etc.)? no, taking calcium and/or vitamin D supplement: yes - multiple vitamin    Amount of exercise or daily activities, outside of work: 2-3 day(s) per week    Problems taking medications regularly No    Medication side effects: No    Have you had an eye exam in the past two years? yes    Do you see a dentist twice per year? yes    Do you have sleep apnea, excessive snoring or daytime drowsiness?yes      Ability to successfully perform activities of daily living: Yes, no assistance needed    Home safety:  none identified     Hearing impairment: No    Fall risk:  Fallen 2 or more times in the past year?: No  Any fall with injury in the past year?: No    click delete button to remove this line now    COGNITIVE SCREEN  1) Repeat 3 items (Banana, Sunrise, Chair)    2) Clock draw: NORMAL  3) 3 item recall: Recalls 2 objects   Results: NORMAL clock, 1-2 items recalled: COGNITIVE IMPAIRMENT LESS LIKELY    Mini-CogTM Copyright S [employer]. Licensed by the author for use in [facility]; reprinted with permission (salvador@.[facility]). All rights reserved.      CHRONIC CONDITIONS FOLLOW-UP    1) Diabetes Follow-up    Patient is checking blood sugars: once daily.  Results are as follows:         am - 100    Diabetic concerns: None     Symptoms of hypoglycemia (low blood sugar): none     Paresthesias (numbness or burning in feet) or sores: No     Date of last diabetic eye exam:       2) Hyperlipidemia Follow-Up      Rate your low fat/cholesterol diet?: not monitoring fat    Taking statin?  No    Other lipid medications/supplements?:  none    3) Hypertension Follow-up      Outpatient blood pressures are not being checked.    Low Salt Diet: not  monitoring salt    NEW CONCERN    1) Positive stool occult test.      Description (location/character/radiation): Patient employed a stool occult kit test outside of the clinic which unfortunately out to be positive.    Accompanying signs and symptoms: Denies any weight loss, poor appetite or rectal bleeding.    History (similar episodes/previous evaluation): None    Precipitating or alleviating factors: No family history.         Problem list and histories reviewed & adjusted, as indicated.  Additional history: as documented     Social History   Substance Use Topics     Smoking status: Former Smoker     Types: Cigarettes     Start date: 1/1/1997     Smokeless tobacco: Never Used     Alcohol use Yes      Comment: socially       If you drink alcohol do you typically have >3 drinks per day or >7 drinks per week? No                        Today's PHQ-2 Score:   PHQ-2 ( 1999 [employer]) 5/16/2018 11/28/2017   Q1: Little interest or pleasure in doing things 0 0   Q2: Feeling down, depressed or hopeless 0 0   PHQ-2 Score 0 0       Do you feel safe in your environment - Yes    Do you have a Health Care Directive?: No: Advance care planning was reviewed with patient; patient declined at this time.    Current providers sharing in care for this patient include:   Patient Care Team:  [facility], [facility] as PCP - General    The following health maintenance items are reviewed in Epic and correct as of today:  Health Maintenance   Topic Date Due     FOOT EXAM Q1 YEAR  01/26/1946     EYE EXAM Q1 YEAR  01/26/1946     TSH W/ FREE T4 REFLEX Q2 YEAR  01/26/1946     TETANUS IMMUNIZATION (SYSTEM ASSIGNED)  01/26/1965     HEPATITIS C SCREENING  01/26/1965     COLON CANCER SCREEN (SYSTEM ASSIGNED)  01/26/1997     ADVANCE DIRECTIVE PLANNING Q5 YRS  01/26/2002     A1C Q6 MO  02/28/2018     LIPID MONITORING Q1 YEAR  05/16/2018     MICROALBUMIN Q1 YEAR  05/16/2018     PNEUMOCOCCAL (2 of 2 - PPSV23) 05/16/2018     CREATININE Q1 YEAR   08/31/2018     FALL RISK ASSESSMENT  11/28/2018     INFLUENZA VACCINE  Completed     AORTIC ANEURYSM SCREENING (SYSTEM ASSIGNED)  Completed     Labs reviewed in EPIC  BP Readings from Last 3 Encounters:   05/16/18 (!) 169/91   11/28/17 158/90   11/27/17 136/87    Wt Readings from Last 3 Encounters:   05/16/18 194 lb (88 kg)   11/28/17 183 lb (83 kg)   11/27/17 183 lb 12.8 oz (83.4 kg)                  Patient Active Problem List   Diagnosis     Obstructive sleep apnea     Uncontrolled type 2 diabetes mellitus without complication, without long-term current use of insulin (H)     Overweight (BMI 25.0-29.9)     Calcified granuloma of lung (H)     Pulmonary nodules     Fatty liver disease, nonalcoholic     Hypertension goal BP (blood pressure) < 140/90     History reviewed. No pertinent surgical history.    Social History   Substance Use Topics     Smoking status: Former Smoker     Types: Cigarettes     Start date: 1/1/1997     Smokeless tobacco: Never Used     Alcohol use Yes      Comment: socially     History reviewed. No pertinent family history.      Allergies   Allergen Reactions     Tylenol [Acetaminophen] Shortness Of Breath, Hives and Swelling     Recent Labs   Lab Test  05/16/18   1234  08/31/17   1114  05/16/17   1610   05/16/17   1454   A1C  7.3*  6.3*  13.4*   --    --    LDL  162*   --    --    --   133*   HDL  47   --    --    --   48   TRIG  130   --    --    --   106   ALT  25  25  33   --    --    CR  0.73  0.85  0.95   < >   --    GFRESTIMATED  >90  89  79   < >   --    GFRESTBLACK  >90  >90  >90  African American GFR Calc     < >   --    POTASSIUM  4.4  5.4*  4.2   < >   --     < > = values in this interval not displayed.            ROS:  CONSTITUTIONAL: NEGATIVE for fever, chills, change in weight  INTEGUMENTARY/SKIN: NEGATIVE for worrisome rashes, moles or lesions  EYES: NEGATIVE for vision changes or irritation  ENT/MOUTH: NEGATIVE for ear, mouth and throat problems  RESP: NEGATIVE for significant  "cough or SOB  CV: NEGATIVE for chest pain, palpitations or peripheral edema  GI: NEGATIVE for hematemesis, hematochezia and jaundice  : NEGATIVE for frequency, dysuria, or hematuria  MUSCULOSKELETAL: NEGATIVE for significant arthralgias or myalgia  NEURO: NEGATIVE for weakness, dizziness or paresthesias  ENDOCRINE: NEGATIVE for temperature intolerance, skin/hair changes  HEME: NEGATIVE for bleeding problems  PSYCHIATRIC: NEGATIVE for changes in mood or affect    OBJECTIVE:   /83 (BP Location: Left arm, Patient Position: Chair, Cuff Size: Adult Regular) Estimated body mass index is 27.02 kg/(m^2) as calculated from the following:    Height as of 10/3/17: 5' 9\" (1.753 m).    Weight as of 11/28/17: 183 lb (83 kg).  EXAM:   GENERAL: healthy, alert and no distress  EYES: Eyes grossly normal to inspection, PERRL and conjunctivae and sclerae normal  HENT: ear canals and TM's normal, nose and mouth without ulcers or lesions  NECK: no adenopathy, no asymmetry, masses, or scars and thyroid normal to palpation  RESP: lungs clear to auscultation - no rales, rhonchi or wheezes  CV: regular rate and rhythm, normal S1 S2, no S3 or S4, no murmur, click or rub, no peripheral edema and peripheral pulses strong  ABDOMEN: soft, nontender and bowel sounds normal but hepatomegaly noted.  MS: no gross musculoskeletal defects noted, no edema  SKIN: no suspicious lesions or rashes  NEURO: Normal strength and tone, mentation intact and speech normal  PSYCH: mentation appears normal, affect normal/bright    ASSESSMENT / PLAN:   (Z00.00) Routine general medical examination at a health care facility  (primary encounter diagnosis)  Comment: No family history of premature atherosclerotic heart disease.  Plan: CBC with platelets and differential,         Comprehensive metabolic panel (BMP + Alb, Alk         Phos, ALT, AST, Total. Bili, TP)            (E11.9,  Z79.4) Controlled type 2 diabetes mellitus without complication, with long-term " current use of insulin (H)  Comment: Worse A1c but remains within goal range.  Plan: metFORMIN (GLUCOPHAGE) 1000 MG tablet,         Hemoglobin A1c, Comprehensive metabolic panel         (BMP + Alb, Alk Phos, ALT, AST, Total. Bili,         TP), Albumin Random Urine Quantitative with         Creat Ratio            (E78.5) Hyperlipidemia LDL goal <100  Comment: Calculated 10 year risk of heart disease/stroke is 52.9%. Start moderate intensity statins.  Plan: Lipid Profile (Chol, Trig, HDL, LDL calc)            (R10.11) Right upper quadrant pain  Comment: Unremarkable exam.  Plan: XR KUB, CT Abdomen Pelvis w Contrast            (R19.5) Positive FIT (fecal immunochemical test)  Comment: Stool occult test obtained at an outside facility.  Plan: XR KUB, CT Abdomen Pelvis w Contrast,         GASTROENTEROLOGY ADULT REF PROCEDURE ONLY         Other; [facility]            (I10) Hypertension goal BP (blood pressure) < 140/90  Comment: Not well-controlled. Consider starting ACE inhibitors during next clinic appointment.  Plan: Comprehensive metabolic panel (BMP + Alb, Alk         Phos, ALT, AST, Total. Bili, TP), Albumin         Random Urine Quantitative with Creat Ratio            End of Life Planning:  Patient currently has an advanced directive: Yes.  Practitioner is supportive of decision.    COUNSELING:  Reviewed preventive health counseling, as reflected in patient instructions       Regular exercise       Healthy diet/nutrition       Colon cancer screening       The 10-year ASCVD risk score (Leronaconnor ARAUJO Jr, et al., 2013) is: 52.9%    Values used to calculate the score:      Age: 73 years      Sex: Male      Is Non- : No      Diabetic: Yes      Tobacco smoker: No      Systolic Blood Pressure: 169 mmHg      Is BP treated: No      HDL Cholesterol: 47 mg/dL      Total Cholesterol: 235 mg/dL    BP Screening:   Last 3 BP Readings:    BP Readings from Last 3 Encounters:   05/16/18 (!) 169/91  "  11/28/17 158/90   11/27/17 136/87       The following was recommended to the patient:  Anti-hypertensive phramacology therapy    Estimated body mass index is 27.02 kg/(m^2) as calculated from the following:    Height as of 10/3/17: 5' 9\" (1.753 m).    Weight as of 11/28/17: 183 lb (83 kg).  Weight management plan: diet and exercise.     reports that he has quit smoking. His smoking use included Cigarettes. He started smoking about 21 years ago. He has never used smokeless tobacco.      Appropriate preventive services were discussed with this patient, including applicable screening as appropriate for cardiovascular disease, diabetes, osteopenia/osteoporosis, and glaucoma.  As appropriate for age/gender, discussed screening for colorectal cancer, prostate cancer, breast cancer, and cervical cancer. Checklist reviewing preventive services available has been given to the patient.    Reviewed patients plan of care and provided an AVS. The Basic Care Plan (routine screening as documented in Health Maintenance) for John meets the Care Plan requirement. This Care Plan has been established and reviewed with the Patient.    Counseling Resources:  ATP IV Guidelines  Pooled Cohorts Equation Calculator  Breast Cancer Risk Calculator  FRAX Risk Assessment  ICSI Preventive Guidelines  Dietary Guidelines for Americans, 2010  USDA's MyPlate  ASA Prophylaxis  Lung CA Screening    Vineet Franco MD  [facility]        "

## 2018-05-16 NOTE — LETTER
May 17, 2018      John Ayala  [address]  [place] [address]              Dear John Ayala                                      Normal CBC indicates no anemia (normal hemoglobin), normal WBC (no ongoing inflammatory/infectious diseases) and normal platelet count (not at risk of easy bruising) despite your recently positive stool occult test.      Enclosed is a copy of the results.  It was a pleasure to see you at your last appointment.    If you have any questions or concerns, please call myself or my nurse at [phone].      Sincerely,      Vineet Franco MD/CECILIA Carrera MA  [employer]      Results for orders placed or performed in visit on 05/16/18   XR KUB    Narrative    ABDOMEN ONE VIEW 5/16/2018 12:55 PM     HISTORY:  Right upper quadrant pain. Positive FIT (fecal  immunochemical test).    COMPARISON: 8/31/2017      Impression    IMPRESSION: The bowel gas pattern is nonobstructive. Moderate amount  of stool. Multiple splenic calcifications consistent with granulomas.  No evidence of free intraperitoneal air.    MARIO ANGULO MD   Hemoglobin A1c   Result Value Ref Range    Hemoglobin A1C 7.3 (H) 0 - 5.6 %   Lipid Profile (Chol, Trig, HDL, LDL calc)   Result Value Ref Range    Cholesterol 235 (H) <200 mg/dL    Triglycerides 130 <150 mg/dL    HDL Cholesterol 47 >39 mg/dL    LDL Cholesterol Calculated 162 (H) <100 mg/dL    Non HDL Cholesterol 188 (H) <130 mg/dL   CBC with platelets and differential   Result Value Ref Range    WBC 7.4 4.0 - 11.0 10e9/L    RBC Count 5.10 4.4 - 5.9 10e12/L    Hemoglobin 16.0 13.3 - 17.7 g/dL    Hematocrit 47.8 40.0 - 53.0 %    MCV 94 78 - 100 fl    MCH 31.4 26.5 - 33.0 pg    MCHC 33.5 31.5 - 36.5 g/dL    RDW 13.1 10.0 - 15.0 %    Platelet Count 186 150 - 450 10e9/L    Diff Method Automated Method     % Neutrophils 71.2 %    % Lymphocytes 20.5 %    % Monocytes 6.5 %    % Eosinophils 1.5 %    % Basophils 0.3 %    Absolute Neutrophil 5.2 1.6 - 8.3 10e9/L     Absolute Lymphocytes 1.5 0.8 - 5.3 10e9/L    Absolute Monocytes 0.5 0.0 - 1.3 10e9/L    Absolute Eosinophils 0.1 0.0 - 0.7 10e9/L    Absolute Basophils 0.0 0.0 - 0.2 10e9/L   Comprehensive metabolic panel (BMP + Alb, Alk Phos, ALT, AST, Total. Bili, TP)   Result Value Ref Range    Sodium 142 133 - 144 mmol/L    Potassium 4.4 3.4 - 5.3 mmol/L    Chloride 107 94 - 109 mmol/L    Carbon Dioxide 26 20 - 32 mmol/L    Anion Gap 9 3 - 14 mmol/L    Glucose 144 (H) 70 - 99 mg/dL    Urea Nitrogen 18 7 - 30 mg/dL    Creatinine 0.73 0.66 - 1.25 mg/dL    GFR Estimate >90 >60 mL/min/1.7m2    GFR Estimate If Black >90 >60 mL/min/1.7m2    Calcium 9.3 8.5 - 10.1 mg/dL    Bilirubin Total 0.5 0.2 - 1.3 mg/dL    Albumin 4.0 3.4 - 5.0 g/dL    Protein Total 7.6 6.8 - 8.8 g/dL    Alkaline Phosphatase 99 40 - 150 U/L    ALT 25 0 - 70 U/L    AST 23 0 - 45 U/L   Albumin Random Urine Quantitative with Creat Ratio   Result Value Ref Range    Creatinine Urine 273 mg/dL    Albumin Urine mg/L 530 mg/L    Albumin Urine mg/g Cr 194.14 (H) 0 - 17 mg/g Cr                 RE: John Ayala   MRN: [id number]  : 1945  ENC DATE: May 16, 2018

## 2018-05-16 NOTE — MR AVS SNAPSHOT
After Visit Summary   5/16/2018    John Ayala    MRN: [id number]           Patient Information     Date Of Birth          1945        Visit Information        Provider Department      5/16/2018 11:40 AM Vineet Franco MD [facility]        Today's Diagnoses     Routine general medical examination at a health care facility    -  1    Controlled type 2 diabetes mellitus without complication, with long-term current use of insulin (H)        Hyperlipidemia LDL goal <100        Screen for colon cancer        Right upper quadrant pain        Positive FIT (fecal immunochemical test)        Hypertension goal BP (blood pressure) < 140/90          Care Instructions    At [facility], we strive to deliver an exceptional experience to you, every time we see you.  If you receive a survey in the mail, please send us back your thoughts. We really do value your feedback.    Based on your medical history, these are the current health maintenance/preventive care services that you are due for (some may have been done at this visit.)  Health Maintenance Due   Topic Date Due     FOOT EXAM Q1 YEAR  01/26/1946     EYE EXAM Q1 YEAR  01/26/1946     TSH W/ FREE T4 REFLEX Q2 YEAR  01/26/1946     TETANUS IMMUNIZATION (SYSTEM ASSIGNED)  01/26/1965     HEPATITIS C SCREENING  01/26/1965     COLON CANCER SCREEN (SYSTEM ASSIGNED)  01/26/1997     ADVANCE DIRECTIVE PLANNING Q5 YRS  01/26/2002     A1C Q6 MO  02/28/2018     LIPID MONITORING Q1 YEAR  05/16/2018     MICROALBUMIN Q1 YEAR  05/16/2018     PNEUMOCOCCAL (2 of 2 - PPSV23) 05/16/2018         Suggested websites for health information:  Www.[employer].Seanodes : Up to date and easily searchable information on multiple topics.  Www.medlineplus.gov : medication info, interactive tutorials, watch real surgeries online  Www.familydoctor.org : good info from the Academy of Family Physicians  Www.cdc.gov : public health info, travel  advisories, epidemics (H1N1)  Www.aap.org : children's health info, normal development, vaccinations  Www.health.[facility].[place].us : [place] dept of health, public health issues in [place], N1N1    Your care team:                            Family Medicine Internal Medicine   MD Vineet Munguia MD Shantel Branch-Fleming, MD Katya Georgiev PA-C Nam Ho, MD Pediatrics   YELENA Blanco, TREVER Oneil APRN CNP   MD Nadia Barrera MD Deborah Mielke, MD Kim Thein, APRN CNP      Clinic hours: Monday - Thursday 7 am-7 pm; Fridays 7 am-5 pm.   Urgent care: Monday - Friday 11 am-9 pm; Saturday and Sunday 9 am-5 pm.  Pharmacy : Monday -Thursday 8 am-8 pm; Friday 8 am-6 pm; Saturday and Sunday 9 am-5 pm.     Clinic: [phone]   Pharmacy: [phone]      Preventive Health Recommendations:   Male Ages 65 and over    Yearly exam:             See your health care provider every year in order to  o   Review health changes.   o   Discuss preventive care.    o   Review your medicines if your doctor has prescribed any.    Talk with your health care provider about whether you should have a test to screen for prostate cancer (PSA).    Every 3 years, have a diabetes test (fasting glucose). If you are at risk for diabetes, you should have this test more often.    Every 5 years, have a cholesterol test. Have this test more often if you are at risk for high cholesterol or heart disease.     Every 10 years, have a colonoscopy. Or, have a yearly FIT test (stool test). These exams will check for colon cancer.    Talk to with your health care provider about screening for Abdominal Aortic Aneurysm if you have a family history of AAA or have a history of smoking.    Shots:     Get a flu shot each year.     Get a tetanus shot every 10 years.     Talk to your doctor about your pneumonia vaccines. There are now two you should receive - Pneumovax (PPSV 23) and Prevnar (PCV 13).     Talk to  your doctor about a shingles vaccine.     Talk to your doctor about the hepatitis B vaccine.  Nutrition:     Eat at least 5 servings of fruits and vegetables each day.     Eat whole-grain bread, whole-wheat pasta and brown rice instead of white grains and rice.     Talk to your provider about Calcium and Vitamin D.   Lifestyle    Exercise for at least 150 minutes a week (30 minutes a day, 5 days a week). This will help you control your weight and prevent disease.     Limit alcohol to one drink per day.     No smoking.     Wear sunscreen to prevent skin cancer.     See your dentist every six months for an exam and cleaning.     See your eye doctor every 1 to 2 years to screen for conditions such as glaucoma, macular degeneration, cataracts, etc               Follow-ups after your visit        Additional Services     GASTROENTEROLOGY ADULT REF PROCEDURE ONLY Other; [facility]       Last Lab Result: Creatinine (mg/dL)       Date                     Value                 08/31/2017               0.85             ----------  Body mass index is 28.65 kg/(m^2).     Needed:  No  Language:  English    Patient will be contacted to schedule procedure.     Please be aware that coverage of these services is subject to the terms and limitations of your health insurance plan.  Call member services at your health plan with any benefit or coverage questions.  Any procedures must be performed at a [facility] facility OR coordinated by your clinic's referral office.    Please bring the following with you to your appointment:    (1) Any X-Rays, CTs or MRIs which have been performed.  Contact the facility where they were done to arrange for  prior to your scheduled appointment.    (2) List of current medications   (3) This referral request   (4) Any documents/labs given to you for this referral                  Future tests that were ordered for you today     Open Future Orders        Priority Expected Expires  "Ordered    CT Abdomen Pelvis w Contrast Routine  2019            Who to contact     If you have questions or need follow up information about today's clinic visit or your schedule please contact [facility] directly at [phone].  Normal or non-critical lab and imaging results will be communicated to you by "[employer]hart, letter or phone within 4 business days after the clinic has received the results. If you do not hear from us within 7 days, please contact the clinic through "[employer]hart or phone. If you have a critical or abnormal lab result, we will notify you by phone as soon as possible.  Submit refill requests through [employer] or call your pharmacy and they will forward the refill request to us. Please allow 3 business days for your refill to be completed.          Additional Information About Your Visit        MyChart Information     [employer] lets you send messages to your doctor, view your test results, renew your prescriptions, schedule appointments and more. To sign up, go to [email or web address] . Click on \"Log in\" on the left side of the screen, which will take you to the Welcome page. Then click on \"Sign up Now\" on the right side of the page.     You will be asked to enter the access code listed below, as well as some personal information. Please follow the directions to create your username and password.     Your access code is: [email or web address]  Expires: 2018 12:26 PM     Your access code will  in 90 days. If you need help or a new code, please call your [place] clinic or [phone].        Care EveryWhere ID     This is your Care EveryWhere ID. This could be used by other organizations to access your [place] medical records  [id number]        Your Vitals Were     Pulse Temperature Respirations Height Pulse Oximetry BMI (Body Mass Index)    92 98.6  F (37  C) (Oral) 16 5' 9\" (1.753 m) 96% 28.65 kg/m2       Blood Pressure from Last 3 Encounters:   18 (!) " 169/91   11/28/17 158/90   11/27/17 136/87    Weight from Last 3 Encounters:   05/16/18 194 lb (88 kg)   11/28/17 183 lb (83 kg)   11/27/17 183 lb 12.8 oz (83.4 kg)              We Performed the Following     Albumin Random Urine Quantitative with Creat Ratio     CBC with platelets and differential     Comprehensive metabolic panel (BMP + Alb, Alk Phos, ALT, AST, Total. Bili, TP)     GASTROENTEROLOGY ADULT REF PROCEDURE ONLY Other; [facility]     Hemoglobin A1c     Lipid Profile (Chol, Trig, HDL, LDL calc)     XR KUB          Where to get your medicines      These medications were sent to [facility] [address] [place] [address] [address] [facility]  [address] [facility] [address]     Phone:  [phone]     metFORMIN 1000 MG tablet          Primary Care Provider Office Phone # Fax #    [facility] [phone] [address]  [facility] [address]        Equal Access to Services     BEAR CORONEL AH: Hadii aad ku hadasho Soomaali, waaxda luqadaha, qaybta kaalmada adeegyada, waxay idiin hayaan adeeg kharash lawallace . So [facility] [phone].    ATENCIÓN: Si habla español, tiene a choi disposición servicios gratuitos de asistencia lingüística. [facility] [phone].    We comply with applicable federal civil rights laws and [place] laws. We do not discriminate on the basis of race, color, national origin, age, disability, sex, sexual orientation, or gender identity.            Thank you!     Thank you for choosing [facility]  for your care. Our goal is always to provide you with excellent care. Hearing back from our patients is one way we can continue to improve our services. Please take a few minutes to complete the written survey that you may receive in the mail after your visit with us. Thank you!             Your Updated Medication List - Protect others around you: Learn how to safely use, store and throw away your medicines at  www.disposemymeds.org.          This list is accurate as of 5/16/18 12:30 PM.  Always use your most recent med list.                   Brand Name Dispense Instructions for use Diagnosis    ACE/ARB/ARNI NOT PRESCRIBED (INTENTIONAL)      Please choose reason not prescribed, below    Uncontrolled type 2 diabetes mellitus without complication, without long-term current use of insulin (H)       aspirin 81 MG tablet      Take by mouth daily    Uncontrolled type 2 diabetes mellitus without complication, without long-term current use of insulin (H)       BASAGLAR 100 UNIT/ML injection     9 mL    Inject 19 Units Subcutaneous At Bedtime    Uncontrolled type 2 diabetes mellitus without complication, without long-term current use of insulin (H)       blood glucose lancets standard    no brand specified    1 Box    Use to test blood sugar two times daily or as directed.    Uncontrolled type 2 diabetes mellitus without complication, without long-term current use of insulin (H)       blood glucose monitoring test strip    no brand specified    100 strip    Use to test blood sugars two times daily or as directed    Uncontrolled type 2 diabetes mellitus without complication, without long-term current use of insulin (H)       insulin pen needle 31G X 6 MM     100 each    Use once daily with Lantus.    Uncontrolled type 2 diabetes mellitus without complication, without long-term current use of insulin (H)       metFORMIN 1000 MG tablet    GLUCOPHAGE    120 tablet    Take 1 tablet (1,000 mg) by mouth 2 times daily (with meals)    Controlled type 2 diabetes mellitus without complication, with long-term current use of insulin (H)       STATIN NOT PRESCRIBED (INTENTIONAL)      Please choose reason not prescribed, below    Uncontrolled type 2 diabetes mellitus without complication, without long-term current use of insulin (H)

## 2018-05-17 ENCOUNTER — TELEPHONE (OUTPATIENT)
Dept: FAMILY MEDICINE | Facility: CLINIC | Age: 73
End: 2018-05-17

## 2018-05-17 NOTE — TELEPHONE ENCOUNTER
Notes Recorded by Vineet Franco MD on 5/16/2018 at 5:10 PM  Despite RUQ pain, official x-ray results only shows moderate amount of colonic stools.  No evidence of bowel obstruction.  Proceed with CT of abdomen/pelvis, as discussed during clinic visit.    (Call patient)    This writer attempted to contact John on 05/17/18      Reason for call results and left message.      If patient calls back:   Patient contacted by a Registered Nurse. Inform patient that someone from the RN group will contact them, document that pt called and route to P DYAD 3 RN POOL [[id number]]        Ashley Thao RN

## 2018-05-18 ENCOUNTER — RADIANT APPOINTMENT (OUTPATIENT)
Dept: CT IMAGING | Facility: CLINIC | Age: 73
End: 2018-05-18
Attending: INTERNAL MEDICINE
Payer: COMMERCIAL

## 2018-05-18 DIAGNOSIS — R10.11 RIGHT UPPER QUADRANT PAIN: ICD-10-CM

## 2018-05-18 DIAGNOSIS — R19.5 POSITIVE FIT (FECAL IMMUNOCHEMICAL TEST): ICD-10-CM

## 2018-05-18 LAB — RADIOLOGIST FLAGS: NORMAL

## 2018-05-18 PROCEDURE — 74177 CT ABD & PELVIS W/CONTRAST: CPT | Performed by: RADIOLOGY

## 2018-05-18 RX ORDER — IOPAMIDOL 755 MG/ML
119 INJECTION, SOLUTION INTRAVASCULAR ONCE
Status: COMPLETED | OUTPATIENT
Start: 2018-05-18 | End: 2018-05-18

## 2018-05-18 RX ADMIN — IOPAMIDOL 119 ML: 755 INJECTION, SOLUTION INTRAVASCULAR at 13:36

## 2018-05-18 NOTE — TELEPHONE ENCOUNTER
Called and spoke with patient. Reviewed and advised on x-ray results below per Dr. Franco. Pt verbalized understanding and has CT scan appt for later today at 1:30pm. RN advised office will contact pt with results, once reviewed by provider. Advised may not hear back from us today and likely to be Monday. Pt agreed. No questions or concerns at this time.    Tonia Samano RN  [facility]

## 2018-05-22 ENCOUNTER — OFFICE VISIT (OUTPATIENT)
Dept: FAMILY MEDICINE | Facility: CLINIC | Age: 73
End: 2018-05-22
Payer: COMMERCIAL

## 2018-05-22 VITALS
RESPIRATION RATE: 20 BRPM | WEIGHT: 191 LBS | HEART RATE: 97 BPM | OXYGEN SATURATION: 98 % | BODY MASS INDEX: 28.29 KG/M2 | HEIGHT: 69 IN | TEMPERATURE: 98.3 F | DIASTOLIC BLOOD PRESSURE: 77 MMHG | SYSTOLIC BLOOD PRESSURE: 122 MMHG

## 2018-05-22 DIAGNOSIS — E78.5 HYPERLIPIDEMIA LDL GOAL <70: ICD-10-CM

## 2018-05-22 DIAGNOSIS — R19.5 POSITIVE OCCULT STOOL BLOOD TEST: Primary | ICD-10-CM

## 2018-05-22 PROCEDURE — 99214 OFFICE O/P EST MOD 30 MIN: CPT | Performed by: INTERNAL MEDICINE

## 2018-05-22 RX ORDER — ATORVASTATIN CALCIUM 20 MG/1
20 TABLET, FILM COATED ORAL
Qty: 90 TABLET | Refills: 3 | Status: SHIPPED | OUTPATIENT
Start: 2018-05-22 | End: 2018-05-22

## 2018-05-22 RX ORDER — ATORVASTATIN CALCIUM 10 MG/1
10 TABLET, FILM COATED ORAL DAILY
Qty: 90 TABLET | Refills: 3 | Status: SHIPPED | OUTPATIENT
Start: 2018-05-22 | End: 2018-05-22

## 2018-05-22 RX ORDER — ATORVASTATIN CALCIUM 20 MG/1
20 TABLET, FILM COATED ORAL
Qty: 90 TABLET | Refills: 3 | Status: SHIPPED | OUTPATIENT
Start: 2018-05-22 | End: 2019-07-09

## 2018-05-22 ASSESSMENT — PAIN SCALES - GENERAL: PAINLEVEL: NO PAIN (0)

## 2018-05-22 NOTE — MR AVS SNAPSHOT
After Visit Summary   5/22/2018    John Ayala    MRN: [id number]           Patient Information     Date Of Birth          1945        Visit Information        Provider Department      5/22/2018 3:20 PM Vineet Franco MD [facility]        Today's Diagnoses     Positive occult stool blood test    -  1    Essential hypertension        Hyperlipidemia LDL goal <70          Care Instructions    At [facility], we strive to deliver an exceptional experience to you, every time we see you.  If you receive a survey in the mail, please send us back your thoughts. We really do value your feedback.    Based on your medical history, these are the current health maintenance/preventive care services that you are due for (some may have been done at this visit.)  Health Maintenance Due   Topic Date Due     FOOT EXAM Q1 YEAR  01/26/1946     EYE EXAM Q1 YEAR  01/26/1946     TSH W/ FREE T4 REFLEX Q2 YEAR  01/26/1946     TETANUS IMMUNIZATION (SYSTEM ASSIGNED)  01/26/1965     HEPATITIS C SCREENING  01/26/1965     COLON CANCER SCREEN (SYSTEM ASSIGNED)  01/26/1997     ADVANCE DIRECTIVE PLANNING Q5 YRS  01/26/2002     PNEUMOCOCCAL (2 of 2 - PPSV23) 05/16/2018         Suggested websites for health information:  Www.[employer].org : Up to date and easily searchable information on multiple topics.  Www.medlineplus.gov : medication info, interactive tutorials, watch real surgeries online  Www.familydoctor.org : good info from the Academy of Family Physicians  Www.cdc.gov : public health info, travel advisories, epidemics (H1N1)  Www.aap.org : children's health info, normal development, vaccinations  Www.health.[facility].mn.us : [place] dept of health, public health issues in [place], N1N1    Your care team:                            Family Medicine Internal Medicine   MD Vineet Munguia MD Shantel Branch-Fleming, MD Katya Georgiev PA-C Nam Ho, MD Pediatrics   Obed  YELENA Hanson, TREVER Oneil APRN MD Nadia Alvarez MD Deborah Mielke, MD Kim Thein, APRN CNP      Clinic hours: Monday - Thursday 7 am-7 pm; Fridays 7 am-5 pm.   Urgent care: Monday - Friday 11 am-9 pm; Saturday and Sunday 9 am-5 pm.  Pharmacy : Monday -Thursday 8 am-8 pm; Friday 8 am-6 pm; Saturday and Sunday 9 am-5 pm.     Clinic: [phone]   Pharmacy: [phone]            Follow-ups after your visit        Additional Services     GASTROENTEROLOGY ADULT REF PROCEDURE ONLY Other; [facility] [phone]       Last Lab Result: Creatinine (mg/dL)       Date                     Value                 05/16/2018               0.73             ----------  Body mass index is 28.21 kg/(m^2).     Needed:  No  Language:  English    Patient will be contacted to schedule procedure.     Please be aware that coverage of these services is subject to the terms and limitations of your health insurance plan.  Call member services at your health plan with any benefit or coverage questions.  Any procedures must be performed at a [facility] facility OR coordinated by your clinic's referral office.    Please bring the following with you to your appointment:    (1) Any X-Rays, CTs or MRIs which have been performed.  Contact the facility where they were done to arrange for  prior to your scheduled appointment.    (2) List of current medications   (3) This referral request   (4) Any documents/labs given to you for this referral                  Future tests that were ordered for you today     Open Future Orders        Priority Expected Expires Ordered    Ova and Parasite Exam Routine Routine  5/22/2019 5/22/2018            Who to contact     If you have questions or need follow up information about today's clinic visit or your schedule please contact [facility] directly at [phone].  Normal or non-critical lab and imaging results will be  "communicated to you by [employer]hart, letter or phone within 4 business days after the clinic has received the results. If you do not hear from us within 7 days, please contact the clinic through [employer] or phone. If you have a critical or abnormal lab result, we will notify you by phone as soon as possible.  Submit refill requests through [employer] or call your pharmacy and they will forward the refill request to us. Please allow 3 business days for your refill to be completed.          Additional Information About Your Visit        iPosition Information     [employer]Position lets you send messages to your doctor, view your test results, renew your prescriptions, schedule appointments and more. To sign up, go to [email or web address] . Click on \"Log in\" on the left side of the screen, which will take you to the Welcome page. Then click on \"Sign up Now\" on the right side of the page.     You will be asked to enter the access code listed below, as well as some personal information. Please follow the directions to create your username and password.     Your access code is: [email or web address]  Expires: 2018 12:26 PM     Your access code will  in 90 days. If you need help or a new code, please call your [place] clinic or [phone].        Care EveryWhere ID     This is your Care EveryWhere ID. This could be used by other organizations to access your [place] medical records  [id number]        Your Vitals Were     Pulse Temperature Respirations Height Pulse Oximetry BMI (Body Mass Index)    97 98.3  F (36.8  C) (Oral) 20 5' 9\" (1.753 m) 98% 28.21 kg/m2       Blood Pressure from Last 3 Encounters:   18 122/77   18 (!) 169/91   17 158/90    Weight from Last 3 Encounters:   18 191 lb (86.6 kg)   18 194 lb (88 kg)   17 183 lb (83 kg)              We Performed the Following     GASTROENTEROLOGY ADULT REF PROCEDURE ONLY Other; [facility] [phone]          Today's Medication Changes          These " changes are accurate as of 5/22/18  3:58 PM.  If you have any questions, ask your nurse or doctor.               Start taking these medicines.        Dose/Directions    * atorvastatin 10 MG tablet   Commonly known as:  LIPITOR   Used for:  Hyperlipidemia LDL goal <70   Started by:  Vineet Franco MD        Dose:  10 mg   Take 1 tablet (10 mg) by mouth daily   Quantity:  90 tablet   Refills:  3       * atorvastatin 20 MG tablet   Commonly known as:  LIPITOR   Used for:  Hyperlipidemia LDL goal <70   Started by:  Vineet Franco MD        Dose:  20 mg   Take 1 tablet (20 mg) by mouth daily (with dinner)   Quantity:  90 tablet   Refills:  3       * Notice:  This list has 2 medication(s) that are the same as other medications prescribed for you. Read the directions carefully, and ask your doctor or other care provider to review them with you.         Where to get your medicines      These medications were sent to [facility] [address] [place] [address]     Phone:  [phone]     atorvastatin 10 MG tablet    atorvastatin 20 MG tablet                Primary Care Provider Office Phone # Fax #    [facility] [phone]       [address]  [facility] [address]        Equal Access to Services     BEAR CORONEL AH: Hadii aad ku hadasho Soomaali, waaxda luqadaha, qaybta kaalmada adeegyada, waxay idiin hayclintonn rasheeda diehl. So [facility] [phone].    ATENCIÓN: Si habla español, tiene a choi disposición servicios gratuitos de asistencia lingüística. Llame al [phone].    We comply with applicable federal civil rights laws and [place] laws. We do not discriminate on the basis of race, color, national origin, age, disability, sex, sexual orientation, or gender identity.            Thank you!     Thank you for choosing [facility]  for your care. Our goal is always to provide you  with excellent care. Hearing back from our patients is one way we can continue to improve our services. Please take a few minutes to complete the written survey that you may receive in the mail after your visit with us. Thank you!             Your Updated Medication List - Protect others around you: Learn how to safely use, store and throw away your medicines at www.disposemymeds.org.          This list is accurate as of 5/22/18  3:58 PM.  Always use your most recent med list.                   Brand Name Dispense Instructions for use Diagnosis    ACE/ARB/ARNI NOT PRESCRIBED (INTENTIONAL)      Please choose reason not prescribed, below    Uncontrolled type 2 diabetes mellitus without complication, without long-term current use of insulin (H)       aspirin 81 MG tablet      Take by mouth daily    Uncontrolled type 2 diabetes mellitus without complication, without long-term current use of insulin (H)       * atorvastatin 10 MG tablet    LIPITOR    90 tablet    Take 1 tablet (10 mg) by mouth daily    Hyperlipidemia LDL goal <70       * atorvastatin 20 MG tablet    LIPITOR    90 tablet    Take 1 tablet (20 mg) by mouth daily (with dinner)    Hyperlipidemia LDL goal <70       BASAGLAR 100 UNIT/ML injection     9 mL    Inject 19 Units Subcutaneous At Bedtime    Uncontrolled type 2 diabetes mellitus without complication, without long-term current use of insulin (H)       blood glucose lancets standard    no brand specified    1 Box    Use to test blood sugar two times daily or as directed.    Uncontrolled type 2 diabetes mellitus without complication, without long-term current use of insulin (H)       blood glucose monitoring test strip    no brand specified    100 strip    Use to test blood sugars two times daily or as directed    Uncontrolled type 2 diabetes mellitus without complication, without long-term current use of insulin (H)       insulin pen needle 31G X 6 MM     100 each    Use once daily with Lantus.     Uncontrolled type 2 diabetes mellitus without complication, without long-term current use of insulin (H)       metFORMIN 1000 MG tablet    GLUCOPHAGE    120 tablet    Take 1 tablet (1,000 mg) by mouth 2 times daily (with meals)    Controlled type 2 diabetes mellitus without complication, with long-term current use of insulin (H)       STATIN NOT PRESCRIBED (INTENTIONAL)      Please choose reason not prescribed, below    Uncontrolled type 2 diabetes mellitus without complication, without long-term current use of insulin (H)       * Notice:  This list has 2 medication(s) that are the same as other medications prescribed for you. Read the directions carefully, and ask your doctor or other care provider to review them with you.

## 2018-05-22 NOTE — Clinical Note
Colonoscopy done last 6/5/2018 at [facility] is normal. Repeat colonoscopy in 10 years. Copy of report sent for abstraction.

## 2018-05-22 NOTE — PATIENT INSTRUCTIONS
At [facility], we strive to deliver an exceptional experience to you, every time we see you.  If you receive a survey in the mail, please send us back your thoughts. We really do value your feedback.    Based on your medical history, these are the current health maintenance/preventive care services that you are due for (some may have been done at this visit.)  Health Maintenance Due   Topic Date Due     FOOT EXAM Q1 YEAR  01/26/1946     EYE EXAM Q1 YEAR  01/26/1946     TSH W/ FREE T4 REFLEX Q2 YEAR  01/26/1946     TETANUS IMMUNIZATION (SYSTEM ASSIGNED)  01/26/1965     HEPATITIS C SCREENING  01/26/1965     COLON CANCER SCREEN (SYSTEM ASSIGNED)  01/26/1997     ADVANCE DIRECTIVE PLANNING Q5 YRS  01/26/2002     PNEUMOCOCCAL (2 of 2 - PPSV23) 05/16/2018         Suggested websites for health information:  Www.[employer] : Up to date and easily searchable information on multiple topics.  Www.[employer].gov : medication info, interactive tutorials, watch real surgeries online  Www.familydoctor.org : good info from the Academy of Family Physicians  Www.cdc.gov : public health info, travel advisories, epidemics (H1N1)  Www.aap.org : children's health info, normal development, vaccinations  Www.health.[facility].[place].us : [place] dept of health, public health issues in [place], N1N1    Your care team:                            Family Medicine Internal Medicine   MD Vineet Munguia MD Shantel Branch-Fleming, MD Katya Georgiev PA-C Nam Ho, MD Pediatrics   YELENA Blanco, MD Nadia Dave CNP, MD Deborah Mielke, MD Kim Thein, APRN CNP      Clinic hours: Monday - Thursday 7 am-7 pm; Fridays 7 am-5 pm.   Urgent care: Monday - Friday 11 am-9 pm; Saturday and Sunday 9 am-5 pm.  Pharmacy : Monday -Thursday 8 am-8 pm; Friday 8 am-6 pm; Saturday and Sunday 9 am-5 pm.     Clinic: [phone]   Pharmacy: [phone]

## 2018-05-22 NOTE — PROGRESS NOTES
[facility] Internal Medicine Progress Note           Assessment and Plan:       (R19.5) Positive occult stool blood test  (primary encounter diagnosis)  Comment:   Plan: GASTROENTEROLOGY ADULT REF PROCEDURE ONLY         Other; [place] [facility] [phone], [facility]         Exam Routine            (E78.5) Hyperlipidemia LDL goal <70  Comment:   Plan: atorvastatin (LIPITOR) 20 MG tablet,                    History of Present Illness         This is a 73 year old diabetic male who comes in today for discussion of test results. Mr. Ayala had a positive stool occult test (obtained from an outside facility). He denies any significant weight loss, melena, hematochezia or jaundice. However, he  does complain of reduced appetite. Multiple tests were obtained during his last clinic visit. CT of abdomen/pelvis show thickening of the posterior rectal wall and splenic calcifications. No abnormalities of the gallbladder, liver nor nor right kidney. No evidence of any malignancy.         Other test results show lipid profile that is worse than last year. HDL is still goal range, but LDL is worse, leading to calculated risk of 52%. Urine albumin is better (previously 1067.90). Patient does not take any ACE inhibitors.         Despite his normal CT scan of abdomen/pelvis, it does not explain his mass sensation at tje RUQ pain.            Significant Problems:   Patient Active Problem List   Diagnosis     Obstructive sleep apnea     Uncontrolled type 2 diabetes mellitus without complication, without long-term current use of insulin (H)     Overweight (BMI 25.0-29.9)     Calcified granuloma of lung (H)     Pulmonary nodules     Fatty liver disease, nonalcoholic     Hypertension goal BP (blood pressure) < 140/90              Review of Systems:   CONSTITUTIONAL: NEGATIVE for fever, chills, change in weight  INTEGUMENTARY/SKIN: NEGATIVE for worrisome rashes, moles or lesions  EYES: NEGATIVE for vision changes or  "irritation  ENT/MOUTH: NEGATIVE for ear, mouth and throat problems  RESP: NEGATIVE for significant cough or SOB  BREAST: NEGATIVE for masses, tenderness or discharge  CV: NEGATIVE for chest pain, palpitations or peripheral edema  GI: NEGATIVE for dyspepsia, dysphagia, gas or bloating, heartburn or reflux and hematemesis  : NEGATIVE for frequency, dysuria, or hematuria  MUSCULOSKELETAL: NEGATIVE for significant arthralgias or myalgia  NEURO: NEGATIVE for weakness, dizziness or paresthesias  ENDOCRINE: cold intolerance, heat intolerance and paresthesias  HEME: NEGATIVE for bleeding problems  PSYCHIATRIC: NEGATIVE for changes in mood or affect            Medications:     Current Outpatient Prescriptions   Medication Sig     ACE/ARB NOT PRESCRIBED, INTENTIONAL, Please choose reason not prescribed, below     aspirin 81 MG tablet Take by mouth daily     atorvastatin (LIPITOR) 10 MG tablet Take 1 tablet (10 mg) by mouth daily     atorvastatin (LIPITOR) 20 MG tablet Take 1 tablet (20 mg) by mouth daily (with dinner)     blood glucose (NO BRAND SPECIFIED) lancets standard Use to test blood sugar two times daily or as directed.     blood glucose monitoring (NO BRAND SPECIFIED) test strip Use to test blood sugars two times daily or as directed     insulin glargine (BASAGLAR KWIKPEN) 100 UNIT/ML injection Inject 19 Units Subcutaneous At Bedtime     insulin pen needle 31G X 6 MM Use once daily with Lantus.     metFORMIN (GLUCOPHAGE) 1000 MG tablet Take 1 tablet (1,000 mg) by mouth 2 times daily (with meals)     STATIN NOT PRESCRIBED, INTENTIONAL, Please choose reason not prescribed, below     No current facility-administered medications for this visit.              Physical Exam:   /77 (BP Location: Left arm, Patient Position: Chair, Cuff Size: Adult Regular)  Pulse 97  Temp 98.3  F (36.8  C) (Oral)  Resp 20  Ht 5' 9\" (1.753 m)  Wt 191 lb (86.6 kg)  SpO2 98%  BMI 28.21 kg/m2  Constitutional: Awake, alert, " cooperative, no apparent distress, and appears stated age.  Eyes: sclera clear and conjunctiva normal  ENT: normocepalic, without obvious abnormality  Lungs: No increased work of breathing, good air exchange, clear to auscultation bilaterally, no crackles or wheezing.  Cardiovascular: Regular rate and rhythm, normal S1 and S2, no S3 or S4, and no murmur noted.  Abdomen: No scars, normal bowel sounds, soft, non-distended, non-tender, no masses palpated, no hepatosplenomegally.  Musculoskeletal: No redness, warmth, or swelling of the joints.  Full range of motion noted.  Motor strength is 5 out of 5 all extremities bilaterally.  Tone is normal.  Neurologic: Cranial Nerves:  cranial nerves II-XII are grossly intact  Motor Exam:  moves all extremities well and symmetrically  Neuropsychiatric: Normal affect, mood, orientation, memory and insight.  Skin: No rashes, erythema, pallor, petechia or purpura.          Data:   Examination:  CT ABDOMEN PELVIS W CONTRAST 5/18/2018 1:42 PM      Comparison: No similar prior. Chest CT 12/14/2017.     History: ; Positive FIT (fecal immunochemical test); Right upper  quadrant pain     Technique: Volumetric helical acquisition of CT images from the lung  bases through the symphysis pubis after the uneventful administration  of 119 ml isovue 370.  Coronal and sagittal images were reconstructed  from the source data.     Findings:     Lung bases:   Indeterminate density seen only on the 1st slice of the axial series  in the left upper lobe measuring up to 13 mm, for example series 6  image 1. There may be some internal calcification. There is otherwise  no focal airspace consolidation or pleural effusion. The heart is  normal in size without pericardial effusion.     Abdomen/pelvis:  There is an area of questionable focal thickening of the posterior  rectal wall (series 3, image 195 through 201) without associated  mucosal hyperenhancement. The colon is otherwise relatively  decompressed  without definite focal abnormality. No dilated loops of  large or small bowel. Small hiatal hernia and duodenal diverticulum.  Scattered diverticulosis without CT evidence of active diverticulitis.  Normal appendix.     The gallbladder and right adrenal gland are unremarkable.  Indeterminate density nodule of the left adrenal gland measuring up to  1.5 cm, compatible with a lipid rich adenoma on prior noncontrast  chest CT. Small bilateral renal cortical cysts and too small to  characterize hypodensities. No hydronephrosis. Nonspecific perinephric  edema. Innumerable punctate calcified granulomas in the spleen. A few  punctate granulomas are also seen in the liver. Diffuse fatty  infiltration of the pancreas. The major intra-abdominal vasculature is  patent and within normal limits for caliber. Marked atherosclerotic  disease of the abdominal aorta. Bladder is partially distended with  mild diffuse wall thickening. The prostate is enlarged measuring up to  5.8 cm in its transverse dimension. There is no pathologic  intra-abdominal lymphadenopathy. No free intraperitoneal air or fluid.     Bones:   No acute osseus abnormality or suspicious bony lesion. Degenerative  spondyloarthropathy. Bilateral pars interarticularis defects at L5-S1  with associated grade 1 anterolisthesis. Small periumbilical  fat-containing hernia. Old rib fractures.         Impression:   1. Questionable thickening of the posterior rectal wall. Consider  direct visualization. The colon is otherwise relatively decompressed  without definite focal abnormality.   2. Incompletely visualized nodule in the lingula as better seen on  chest CT of 12/14/2017 at which time a follow-up chest CT in December 2018 was recommended.   3. Enlarged prostate gland with bladder findings of likely chronic  obstructive uropathy.       ABDOMEN ONE VIEW 5/16/2018 12:55 PM      HISTORY:  Right upper quadrant pain. Positive FIT (fecal  immunochemical  test).     COMPARISON: 8/31/2017         IMPRESSION: The bowel gas pattern is nonobstructive. Moderate amount  of stool. Multiple splenic calcifications consistent with granulomas.  No evidence of free intraperitoneal air.     MARIO ANGULO MD    Cholesterol 235 (H) <200 mg/dL Final 05/16/2018 12:34 PM MG   Comment:   Desirable:       <200 mg/dl   Triglycerides 130  <150 mg/dL Final 05/16/2018 12:34 PM MG   Comment:   Fasting specimen   HDL Cholesterol 47  >39 mg/dL Final 05/16/2018 12:34 PM MG   LDL Cholesterol Calculated 162 (H) <100 mg/dL Final 05/16/2018 12:34 PM MG   Comment:   Above desirable:  100-129 mg/dl   Borderline High:  130-159 mg/dL   High:             160-189 mg/dL   Very high:       >189 mg/dl      Non HDL Cholesterol 188 (H) <130 mg/dL Final 05/16/2018 12:34 PM MG   Comment:     Sodium 142  133 - 144 mmol/L Final 05/16/2018 12:34 PM MG   Potassium 4.4  3.4 - 5.3 mmol/L Final 05/16/2018 12:34 PM MG   Chloride 107  94 - 109 mmol/L Final 05/16/2018 12:34 PM MG   Carbon Dioxide 26  20 - 32 mmol/L Final 05/16/2018 12:34 PM MG   Anion Gap 9  3 - 14 mmol/L Final 05/16/2018 12:34 PM MG   Glucose 144 (H) 70 - 99 mg/dL Final 05/16/2018 12:34 PM MG   Comment:   Fasting specimen   Urea Nitrogen 18  7 - 30 mg/dL Final 05/16/2018 12:34 PM MG   Creatinine 0.73  0.66 - 1.25 mg/dL Final 05/16/2018 12:34 PM MG   GFR Estimate >90  >60 mL/min/1.7m2 Final 05/16/2018 12:34 PM MG   Comment:   Non  GFR Calc   GFR Estimate If Black >90  >60 mL/min/1.7m2 Final 05/16/2018 12:34 PM      Creatinine Urine 273   mg/dL Final 05/16/2018 12:52 PM 59   Albumin Urine mg/L 530   mg/L Final 05/16/2018 12:52 PM 59   Albumin Urine mg/g Cr 194.14 (H) 0 - 17 mg/g Cr Final 05/16/2018 12:52 PM              Disposition:      Vineet Franco MD  Internal Medicine  [facility]

## 2018-05-24 DIAGNOSIS — R19.5 POSITIVE OCCULT STOOL BLOOD TEST: ICD-10-CM

## 2018-05-24 PROCEDURE — 87177 OVA AND PARASITES SMEARS: CPT | Performed by: INTERNAL MEDICINE

## 2018-05-24 PROCEDURE — 87209 SMEAR COMPLEX STAIN: CPT | Performed by: INTERNAL MEDICINE

## 2018-05-25 LAB
O+P STL MICRO: NORMAL
O+P STL MICRO: NORMAL
SPECIMEN SOURCE: NORMAL

## 2018-06-05 ENCOUNTER — TRANSFERRED RECORDS (OUTPATIENT)
Dept: HEALTH INFORMATION MANAGEMENT | Facility: CLINIC | Age: 73
End: 2018-06-05

## 2018-08-21 RX ORDER — INSULIN GLARGINE 100 [IU]/ML
INJECTION, SOLUTION SUBCUTANEOUS
Qty: 15 ML | Refills: 3 | Status: SHIPPED | OUTPATIENT
Start: 2018-08-21 | End: 2019-06-18

## 2018-08-21 NOTE — TELEPHONE ENCOUNTER
Reason for Call:  Other prescription    Detailed comments: patient is currently out of Insuline and is requesting this be expedited. Thanks.     Phone Number Patient can be reached at: Cell number on file:    Telephone Information:   Mobile [phone]       Best Time: anytime     Can we leave a detailed message on this number? YES    Call taken on 8/21/2018 at 5:28 PM by Jen Peter

## 2018-08-21 NOTE — TELEPHONE ENCOUNTER
Prescription approved per [facility] Refill Protocol.    Leona Viramontes RN, BSN

## 2019-02-04 ENCOUNTER — TRANSFERRED RECORDS (OUTPATIENT)
Dept: HEALTH INFORMATION MANAGEMENT | Facility: CLINIC | Age: 74
End: 2019-02-04

## 2019-06-18 ENCOUNTER — NURSE TRIAGE (OUTPATIENT)
Dept: NURSING | Facility: CLINIC | Age: 74
End: 2019-06-18

## 2019-06-18 NOTE — LETTER
June 21, 2019      John Ayala  [address]  [place] [facility] [address]        Dear John Ayala,    The refill request made by your pharmacy was received at the clinic.     Signed Prescriptions:                        Disp   Refills    insulin glargine (BASAGLAR KWIKPEN) 100 UN*6 mL   0        Sig: Inject 19 Units Subcutaneous At Bedtime ++NEEDS TO BE           SEEN IN OFFICE FOR FURTHER REFILLS++  Authorizing Provider: MICHELLE FRANCO  Ordering User: GAMAL DURBIN      At this time you are due in the clinic for a follow up appointment. A one time dayan refill has been sent to your pharmacy. The care team has tried contacting you but with no response back.    Please call your clinic to make an appointment with your provider before you run out of medication. This will prevent a delay in your next month's refill.  If you have already scheduled an appointment with your doctor please disregard this letter.     [facility] [phone]    For your convenience we also offer online appointment scheduling at [email or web address] or [email or web address].     We invite you to refill your next prescription at a [facility] or take advantage of our prescription mail service.      Sincerely,    [facility] with Dr. Franco

## 2019-06-18 NOTE — TELEPHONE ENCOUNTER
"[facility] pharmacist states pt is completely out of insulin. Refill request just sent to PCP Dr. Franco. Last visit for DM check 05/16/18. Advised pharmacist refill 1 pen as we do not decline needed refill on insulin. However, pt needs to be seen for ASAP for diabetic check as he has not been seen in over 1 year. Pharmacist will inform pt of this directive.   Reason for Disposition    Pharmacy calling with prescription question and triager answers question    Additional Information    Negative: Drug overdose and nurse unable to answer question    Negative: Caller requesting information not related to medicine    Negative: Caller requesting a prescription for Strep throat and has a positive culture result    Negative: Rash while taking a medication or within 3 days of stopping it    Negative: Immunization reaction suspected    Negative: [1] Asthma and [2] having symptoms of asthma (cough, wheezing, etc)    Negative: MORE THAN A DOUBLE DOSE of a prescription or over-the-counter (OTC) drug    Negative: [1] DOUBLE DOSE (an extra dose or lesser amount) of over-the-counter (OTC) drug AND [2] any symptoms (e.g., dizziness, nausea, pain, sleepiness)    Negative: [1] DOUBLE DOSE (an extra dose or lesser amount) of prescription drug AND [2] any symptoms (e.g., dizziness, nausea, pain, sleepiness)    Negative: Took another person's prescription drug    Negative: [1] DOUBLE DOSE (an extra dose or lesser amount) of prescription drug AND [2] NO symptoms (Exception: a double dose of antibiotics)    Negative: Diabetes drug error or overdose (e.g., insulin or extra dose)    Negative: [1] Request for URGENT new prescription or refill of \"essential\" medication (i.e., likelihood of harm to patient if not taken) AND [2] triager unable to fill per unit policy    Negative: [1] Prescription not at pharmacy AND [2] was prescribed today by PCP    Negative: Pharmacy calling with prescription questions and triager unable to answer question    " Negative: Caller has URGENT medication question about med that PCP prescribed and triager unable to answer question    Negative: Caller has NON-URGENT medication question about med that PCP prescribed and triager unable to answer question    Negative: Caller requesting a NON-URGENT new prescription or refill and triager unable to refill per unit policy    Negative: Caller has medication question about med not prescribed by PCP and triager unable to answer question (e.g., compatibility with other med, storage)    Negative: [1] DOUBLE DOSE (an extra dose or lesser amount) of over-the-counter (OTC) drug AND [2] NO symptoms    Negative: [1] DOUBLE DOSE (an extra dose or lesser amount) of antibiotic drug AND [2] NO symptoms    Negative: Caller has medication question only, adult not sick, and triager answers question    Negative: Caller has medication question, adult has minor symptoms, caller declines triage, and triager answers question    Negative: Caller requesting information about medication during pregnancy; adult is not ill and triager answers question    Negative: Caller requesting information about medication use with breastfeeding; neither adult nor infant is ill, and triager answers question    Negative: Caller requesting a refill, no triage required, and triager able to refill per unit policy    Protocols used: MEDICATION QUESTION CALL-A-

## 2019-06-18 NOTE — TELEPHONE ENCOUNTER
"Requested Prescriptions   Pending Prescriptions Disp Refills     insulin glargine (BASAGLAR KWIKPEN) 100 UNIT/ML pen [Pharmacy Med Name: BASAGLAR 100UNIT    INJ]      Last Written Prescription Date:  8/21/18  Last Fill Quantity: 15 ml,  # refills: 3   Last Office Visit with G, P or [facility] prescribing provider:  5/22/18   Future Office Visit:       3     Sig: INJECT 19 UNITS SUBCUTANEOUSLY AT BEDTIME       Long Acting Insulin Protocol Failed - 6/18/2019  5:54 PM        Failed - Blood pressure less than 140/90 in past 6 months     BP Readings from Last 3 Encounters:   05/22/18 122/77   05/16/18 (!) 169/91   11/28/17 158/90                 Failed - LDL on file in past 12 months     Recent Labs   Lab Test 05/16/18  1234   *             Failed - Serum creatinine on file in past 12 months     Recent Labs   Lab Test 05/16/18  1234   CR 0.73             Failed - HgbA1C in past 3 or 6 months     If HgbA1C is 8 or greater, it needs to be on file within the past 3 months.  If less than 8, must be on file within the past 6 months.     Recent Labs   Lab Test 05/16/18  1234   A1C 7.3*             Failed - Recent (6 mo) or future (30 days) visit within the authorizing provider's specialty     Patient had office visit in the last 6 months or has a visit in the next 30 days with authorizing provider or within the authorizing provider's specialty.  See \"Patient Info\" tab in inbasket, or \"Choose Columns\" in Meds & Orders section of the refill encounter.            Passed - Microalbumin on file in past 12 months     Recent Labs   Lab Test 05/16/18  1252   MICROL 530   UMALCR 194.14*             Passed - Medication is active on med list        Passed - Patient is age 18 or older              Willie Faarax  [facility]  "

## 2019-06-20 RX ORDER — INSULIN GLARGINE 100 [IU]/ML
19 INJECTION, SOLUTION SUBCUTANEOUS AT BEDTIME
Qty: 6 ML | Refills: 0 | Status: SHIPPED | OUTPATIENT
Start: 2019-06-20 | End: 2019-06-27

## 2019-06-20 NOTE — TELEPHONE ENCOUNTER
Medication is being filled for 1 time refill only due to:  Patient needs to be seen because it has been more than one year since last visit.   1 month dayan refill given, per protocol  Patient needs to be seen in office for appointment and labs, for any further refills.    Please contact patient and inform of dayan refill and need to be seen, assist in scheduling, if able.  Thank you.    Tonia Samano RN

## 2019-06-20 NOTE — TELEPHONE ENCOUNTER
Called patient and he picked up but states is driving on the road right now and doesn't want to talk at this time then hung up.    Kevin Mayorga CMA

## 2019-06-21 NOTE — TELEPHONE ENCOUNTER
Mailing dayan refill letter to patient's home address to call our appointment line and schedule an appointment for further refills after this.  Ho Roque,  [facility]

## 2019-06-27 ENCOUNTER — OFFICE VISIT (OUTPATIENT)
Dept: URGENT CARE | Facility: URGENT CARE | Age: 74
End: 2019-06-27
Payer: COMMERCIAL

## 2019-06-27 VITALS
WEIGHT: 200.4 LBS | OXYGEN SATURATION: 98 % | DIASTOLIC BLOOD PRESSURE: 108 MMHG | SYSTOLIC BLOOD PRESSURE: 165 MMHG | TEMPERATURE: 98.4 F | HEART RATE: 104 BPM | BODY MASS INDEX: 29.59 KG/M2

## 2019-06-27 PROCEDURE — 99213 OFFICE O/P EST LOW 20 MIN: CPT | Performed by: PHYSICIAN ASSISTANT

## 2019-06-27 RX ORDER — INSULIN GLARGINE 100 [IU]/ML
19 INJECTION, SOLUTION SUBCUTANEOUS AT BEDTIME
Qty: 6 ML | Refills: 0 | Status: SHIPPED | OUTPATIENT
Start: 2019-06-27 | End: 2019-07-09

## 2019-06-27 ASSESSMENT — ENCOUNTER SYMPTOMS
GASTROINTESTINAL NEGATIVE: 1
VOMITING: 0
RESPIRATORY NEGATIVE: 1
WEAKNESS: 0
FREQUENCY: 0
POLYDIPSIA: 0
WHEEZING: 0
COUGH: 0
ABDOMINAL PAIN: 0
SHORTNESS OF BREATH: 0
DIAPHORESIS: 0
ENDOCRINE NEGATIVE: 1
EYE DISCHARGE: 0
HEMATURIA: 0
DIARRHEA: 0
EYES NEGATIVE: 1
LIGHT-HEADEDNESS: 0
MUSCULOSKELETAL NEGATIVE: 1
EYE REDNESS: 0
DIZZINESS: 0
CHEST TIGHTNESS: 0
CHILLS: 0
DYSURIA: 0
NAUSEA: 0
MYALGIAS: 0
CARDIOVASCULAR NEGATIVE: 1
ADENOPATHY: 0
NEUROLOGICAL NEGATIVE: 1
SORE THROAT: 0
RHINORRHEA: 0
PALPITATIONS: 0
FEVER: 0
HEADACHES: 0
EYE ITCHING: 0
CONSTITUTIONAL NEGATIVE: 1

## 2019-06-27 NOTE — PROGRESS NOTES
Chief Complaint:    Chief Complaint   Patient presents with     Diabetes     Patient complains that he is out of insulin        HPI: John Ayala is an 74 year old male who presents for evaluation and treatment of DM.  Patient is out of insulin.  He was given a refill of this 9 days ago and advised that he will need to see his PCP for further refills.  Patient has not done this yet.  Patient has appointment with his PCP on July 9th.  He will be given 1 refill.  No other complaints at this time.      ROS:      Review of Systems   Constitutional: Negative.  Negative for chills, diaphoresis and fever.   HENT: Negative.  Negative for congestion, ear pain, rhinorrhea and sore throat.    Eyes: Negative.  Negative for discharge, redness and itching.   Respiratory: Negative.  Negative for cough, chest tightness, shortness of breath and wheezing.    Cardiovascular: Negative.  Negative for chest pain and palpitations.   Gastrointestinal: Negative.  Negative for abdominal pain, diarrhea, nausea and vomiting.   Endocrine: Negative.  Negative for polydipsia and polyuria.   Genitourinary: Negative for dysuria, frequency, hematuria and urgency.   Musculoskeletal: Negative.  Negative for myalgias.   Skin: Negative for rash.   Allergic/Immunologic: Negative for immunocompromised state.   Neurological: Negative.  Negative for dizziness, weakness, light-headedness and headaches.   Hematological: Negative for adenopathy.        Family History   No family history on file.    Social History  Social History     Socioeconomic History     Marital status: Single     Spouse name: Not on file     Number of children: Not on file     Years of education: Not on file     Highest education level: Not on file   Occupational History     Not on file   Social Needs     Financial resource strain: Not on file     Food insecurity:     Worry: Not on file     Inability: Not on file     Transportation needs:     Medical: Not on file     Non-medical:  Not on file   Tobacco Use     Smoking status: Former Smoker     Types: Cigarettes     Start date: 1/1/1997     Smokeless tobacco: Never Used   Substance and Sexual Activity     Alcohol use: Yes     Comment: socially     Drug use: No     Sexual activity: Not Currently   Lifestyle     Physical activity:     Days per week: Not on file     Minutes per session: Not on file     Stress: Not on file   Relationships     Social connections:     Talks on phone: Not on file     Gets together: Not on file     Attends Buddhism service: Not on file     Active member of club or organization: Not on file     Attends meetings of clubs or organizations: Not on file     Relationship status: Not on file     Intimate partner violence:     Fear of current or ex partner: Not on file     Emotionally abused: Not on file     Physically abused: Not on file     Forced sexual activity: Not on file   Other Topics Concern     Parent/sibling w/ CABG, MI or angioplasty before 65F 55M? Not Asked   Social History Narrative     Not on file        Surgical History:  No past surgical history on file.     Problem List:  Patient Active Problem List   Diagnosis     Obstructive sleep apnea     Uncontrolled type 2 diabetes mellitus without complication, without long-term current use of insulin (H)     Overweight (BMI 25.0-29.9)     Calcified granuloma of lung (H)     Pulmonary nodules     Fatty liver disease, nonalcoholic     Hypertension goal BP (blood pressure) < 140/90     Hyperlipidemia LDL goal <70     Positive occult stool blood test        Allergies:  Allergies   Allergen Reactions     Tylenol [Acetaminophen] Shortness Of Breath, Hives and Swelling        Current Meds:    Current Outpatient Medications:      insulin glargine (BASAGLAR KWIKPEN) 100 UNIT/ML pen, Inject 19 Units Subcutaneous At Bedtime ++NEEDS TO BE SEEN IN OFFICE FOR FURTHER REFILLS++, Disp: 6 mL, Rfl: 0     ACE/ARB NOT PRESCRIBED, INTENTIONAL,, Please choose reason not prescribed,  below, Disp: , Rfl:      aspirin 81 MG tablet, Take by mouth daily, Disp: , Rfl:      atorvastatin (LIPITOR) 20 MG tablet, Take 1 tablet (20 mg) by mouth daily (with dinner), Disp: 90 tablet, Rfl: 3     blood glucose (NO BRAND SPECIFIED) lancets standard, Use to test blood sugar two times daily or as directed., Disp: 1 Box, Rfl: 11     blood glucose monitoring (NO BRAND SPECIFIED) test strip, Use to test blood sugars two times daily or as directed, Disp: 100 strip, Rfl: 3     insulin pen needle 31G X 6 MM, Use once daily with Lantus., Disp: 100 each, Rfl: 3     metFORMIN (GLUCOPHAGE) 1000 MG tablet, Take 1 tablet (1,000 mg) by mouth 2 times daily (with meals), Disp: 120 tablet, Rfl: 5     STATIN NOT PRESCRIBED, INTENTIONAL,, Please choose reason not prescribed, below, Disp: , Rfl:      PHYSICAL EXAM:     Vital signs noted and reviewed by Marty Walker  There were no vitals taken for this visit.     PEFR:    Physical Exam   Constitutional: He appears well-developed and well-nourished. He is cooperative.  Non-toxic appearance. He does not have a sickly appearance. He does not appear ill. No distress.   HENT:   Head: Normocephalic and atraumatic.   Right Ear: Hearing, tympanic membrane, external ear and ear canal normal. Tympanic membrane is not perforated, not erythematous, not retracted and not bulging.   Left Ear: Hearing, tympanic membrane, external ear and ear canal normal. Tympanic membrane is not perforated, not erythematous, not retracted and not bulging.   Nose: Nose normal. No mucosal edema or rhinorrhea.   Mouth/Throat: Oropharynx is clear and moist and mucous membranes are normal. No oropharyngeal exudate, posterior oropharyngeal edema, posterior oropharyngeal erythema or tonsillar abscesses. Tonsils are 0 on the right. Tonsils are 0 on the left. No tonsillar exudate.   Eyes: Pupils are equal, round, and reactive to light. EOM are normal.   Neck: Normal range of motion. Neck supple.   Cardiovascular:  Normal rate, regular rhythm, S1 normal, S2 normal, normal heart sounds and intact distal pulses. Exam reveals no gallop, no distant heart sounds and no friction rub.   No murmur heard.  Pulmonary/Chest: Effort normal and breath sounds normal. No respiratory distress. He has no decreased breath sounds. He has no wheezes. He has no rhonchi. He has no rales.   Abdominal: Soft. Bowel sounds are normal. He exhibits no distension. There is no tenderness.   Lymphadenopathy:     He has no cervical adenopathy.   Neurological: He is alert. No cranial nerve deficit.   Skin: Skin is warm and dry. No rash noted. He is not diaphoretic.   Psychiatric: He has a normal mood and affect. His speech is normal and behavior is normal. Judgment and thought content normal. Cognition and memory are normal. He is attentive.   Nursing note and vitals reviewed.         ASSESSMENT:     1. Uncontrolled type 2 diabetes mellitus without complication, without long-term current use of insulin (H)         PLAN:     Patient given 1 refill of his insulin in clinic today.  Patient will be following up with his PCP next week as he has not had a visit in over 1 year.  Worrisome symptoms discussed with instructions to go to the ED.  Patient verbalized understanding and agreed with this plan.     Marty Walker  6/27/2019, 3:39 PM

## 2019-07-09 ENCOUNTER — OFFICE VISIT (OUTPATIENT)
Dept: FAMILY MEDICINE | Facility: CLINIC | Age: 74
End: 2019-07-09
Payer: COMMERCIAL

## 2019-07-09 VITALS
BODY MASS INDEX: 29.33 KG/M2 | DIASTOLIC BLOOD PRESSURE: 70 MMHG | SYSTOLIC BLOOD PRESSURE: 144 MMHG | HEART RATE: 89 BPM | HEIGHT: 69 IN | WEIGHT: 198 LBS | OXYGEN SATURATION: 99 % | TEMPERATURE: 98.2 F | RESPIRATION RATE: 16 BRPM

## 2019-07-09 DIAGNOSIS — E11.9 CONTROLLED TYPE 2 DIABETES MELLITUS WITHOUT COMPLICATION, WITH LONG-TERM CURRENT USE OF INSULIN (H): Primary | ICD-10-CM

## 2019-07-09 DIAGNOSIS — I10 HYPERTENSION GOAL BP (BLOOD PRESSURE) < 140/90: ICD-10-CM

## 2019-07-09 DIAGNOSIS — E78.5 HYPERLIPIDEMIA LDL GOAL <70: ICD-10-CM

## 2019-07-09 DIAGNOSIS — Z79.4 CONTROLLED TYPE 2 DIABETES MELLITUS WITHOUT COMPLICATION, WITH LONG-TERM CURRENT USE OF INSULIN (H): Primary | ICD-10-CM

## 2019-07-09 DIAGNOSIS — Z23 NEED FOR TDAP VACCINATION: ICD-10-CM

## 2019-07-09 LAB
ANION GAP SERPL CALCULATED.3IONS-SCNC: 11 MMOL/L (ref 3–14)
BUN SERPL-MCNC: 18 MG/DL (ref 7–30)
CALCIUM SERPL-MCNC: 9.6 MG/DL (ref 8.5–10.1)
CHLORIDE SERPL-SCNC: 105 MMOL/L (ref 94–109)
CHOLEST SERPL-MCNC: 228 MG/DL
CO2 SERPL-SCNC: 23 MMOL/L (ref 20–32)
CREAT SERPL-MCNC: 0.94 MG/DL (ref 0.66–1.25)
CREAT UR-MCNC: 264 MG/DL
GFR SERPL CREATININE-BSD FRML MDRD: 80 ML/MIN/{1.73_M2}
GLUCOSE SERPL-MCNC: 129 MG/DL (ref 70–99)
HBA1C MFR BLD: 7.3 % (ref 0–5.6)
HDLC SERPL-MCNC: 49 MG/DL
LDLC SERPL CALC-MCNC: 141 MG/DL
MICROALBUMIN UR-MCNC: 288 MG/L
MICROALBUMIN/CREAT UR: 109.09 MG/G CR (ref 0–17)
NONHDLC SERPL-MCNC: 179 MG/DL
POTASSIUM SERPL-SCNC: 4.3 MMOL/L (ref 3.4–5.3)
SODIUM SERPL-SCNC: 139 MMOL/L (ref 133–144)
TRIGL SERPL-MCNC: 189 MG/DL
TSH SERPL DL<=0.005 MIU/L-ACNC: 1.44 MU/L (ref 0.4–4)

## 2019-07-09 PROCEDURE — 83036 HEMOGLOBIN GLYCOSYLATED A1C: CPT | Performed by: INTERNAL MEDICINE

## 2019-07-09 PROCEDURE — 80061 LIPID PANEL: CPT | Performed by: INTERNAL MEDICINE

## 2019-07-09 PROCEDURE — 36415 COLL VENOUS BLD VENIPUNCTURE: CPT | Performed by: INTERNAL MEDICINE

## 2019-07-09 PROCEDURE — 82043 UR ALBUMIN QUANTITATIVE: CPT | Performed by: INTERNAL MEDICINE

## 2019-07-09 PROCEDURE — 80048 BASIC METABOLIC PNL TOTAL CA: CPT | Performed by: INTERNAL MEDICINE

## 2019-07-09 PROCEDURE — 84443 ASSAY THYROID STIM HORMONE: CPT | Performed by: INTERNAL MEDICINE

## 2019-07-09 PROCEDURE — 99214 OFFICE O/P EST MOD 30 MIN: CPT | Performed by: INTERNAL MEDICINE

## 2019-07-09 RX ORDER — ATORVASTATIN CALCIUM 20 MG/1
20 TABLET, FILM COATED ORAL
Qty: 90 TABLET | Refills: 3 | Status: SHIPPED | OUTPATIENT
Start: 2019-07-09 | End: 2019-08-23

## 2019-07-09 RX ORDER — INSULIN GLARGINE 100 [IU]/ML
24 INJECTION, SOLUTION SUBCUTANEOUS AT BEDTIME
Qty: 30 ML | Refills: 1 | Status: SHIPPED | OUTPATIENT
Start: 2019-07-09 | End: 2020-01-10

## 2019-07-09 ASSESSMENT — MIFFLIN-ST. JEOR: SCORE: 1638.5

## 2019-07-09 ASSESSMENT — PAIN SCALES - GENERAL: PAINLEVEL: NO PAIN (0)

## 2019-07-09 NOTE — PROGRESS NOTES
Subjective     John Ayala is a 74 year old male who presents to clinic today for the following health issues:    HPI   Diabetes Follow-up      How often are you checking your blood sugar? Two times daily    What time of day are you checking your blood sugars (select all that apply)?  Before meals and After meals    Have you had any blood sugars above 200?  No    Have you had any blood sugars below 70?  No    What symptoms do you notice when your blood sugar is low?  None    What concerns do you have today about your diabetes? Other: retinopathy      Do you have any of these symptoms? (Select all that apply)  No numbness or tingling in feet.  No redness, sores or blisters on feet.  No complaints of excessive thirst.  No reports of blurry vision.  No significant changes to weight.     Have you had a diabetic eye exam in the last 12 months? Yes- Date of last eye exam: June 2019    Diabetes Management Resources    Hyperlipidemia Follow-Up      Are you having any of the following symptoms? (Select all that apply)  No complaints of shortness of breath, chest pain or pressure.  No increased sweating or nausea with activity.  No left-sided neck or arm pain.  No complaints of pain in calves when walking 1-2 blocks.    Are you regularly taking any medication or supplement to lower your cholesterol?   Yes- Atorvastatin     Are you having muscle aches or other side effects that you think could be caused by your cholesterol lowering medication?  No    Hypertension Follow-up      Do you check your blood pressure regularly outside of the clinic? Yes, occasionally     Are you following a low salt diet? Yes    Are your blood pressures ever more than 140 on the top number (systolic) OR more   than 90 on the bottom number (diastolic), for example 140/90? Yes, sometimes     BP Readings from Last 2 Encounters:   06/27/19 (!) 165/108   05/22/18 122/77     Hemoglobin A1C (%)   Date Value   05/16/2018 7.3 (H)   08/31/2017 6.3 (H)      LDL Cholesterol Calculated (mg/dL)   Date Value   05/16/2018 162 (H)   05/16/2017 133 (H)       Amount of exercise or physical activity: 2-3 days/week for an average of 45-60 minutes    Problems taking medications regularly: No    Medication side effects: none    Diet: regular (no restrictions) and low salt          Patient Active Problem List   Diagnosis     Obstructive sleep apnea     Uncontrolled type 2 diabetes mellitus without complication, without long-term current use of insulin (H)     Overweight (BMI 25.0-29.9)     Calcified granuloma of lung (H)     Pulmonary nodules     Fatty liver disease, nonalcoholic     Hypertension goal BP (blood pressure) < 140/90     Hyperlipidemia LDL goal <70     Positive occult stool blood test     History reviewed. No pertinent surgical history.    Social History     Tobacco Use     Smoking status: Former Smoker     Types: Cigarettes     Start date: 1/1/1997     Smokeless tobacco: Never Used   Substance Use Topics     Alcohol use: Yes     Comment: socially     History reviewed. No pertinent family history.      Allergies   Allergen Reactions     Tylenol [Acetaminophen] Shortness Of Breath, Hives and Swelling     Recent Labs   Lab Test 07/09/19  1401 05/16/18  1234 08/31/17  1114 05/16/17  1610  05/16/17  1454   A1C 7.3* 7.3* 6.3* 13.4*   < >  --    * 162*  --   --   --  133*   HDL 49 47  --   --   --  48   TRIG 189* 130  --   --   --  106   ALT  --  25 25 33  --   --    CR 0.94 0.73 0.85 0.95   < >  --    GFRESTIMATED 80 >90 89 79   < >  --    GFRESTBLACK >90 >90 >90 >90  African American GFR Calc     < >  --    POTASSIUM 4.3 4.4 5.4* 4.2   < >  --    TSH 1.44  --   --   --   --   --     < > = values in this interval not displayed.      BP Readings from Last 3 Encounters:   07/09/19 144/70   06/27/19 (!) 165/108   05/22/18 122/77    Wt Readings from Last 3 Encounters:   07/09/19 89.8 kg (198 lb)   06/27/19 90.9 kg (200 lb 6.4 oz)   05/22/18 86.6 kg (191 lb)           "            Reviewed and updated as needed this visit by Provider         Review of Systems         Objective  Physical Exam   /70 (BP Location: Left arm, Patient Position: Chair, Cuff Size: Adult Regular)   Pulse 89   Temp 98.2  F (36.8  C) (Oral)   Resp 16   Ht 1.753 m (5' 9\")   Wt 89.8 kg (198 lb)   SpO2 99%   BMI 29.24 kg/m    GENERAL: healthy, alert and no distress  EYES: Eyes grossly normal to inspection, PERRL and conjunctivae and sclerae normal  HENT: ear canals and TM's normal, nose and mouth without ulcers or lesions  NECK: no adenopathy, no asymmetry, masses, or scars and thyroid normal to palpation  RESP: lungs clear to auscultation - no rales, rhonchi or wheezes  CV: regular rate and rhythm, normal S1 S2, no S3 or S4, no murmur, click or rub, no peripheral edema and peripheral pulses strong  ABDOMEN: soft, nontender, no hepatosplenomegaly, no masses and bowel sounds normal  MS: no gross musculoskeletal defects noted, no edema  SKIN: no suspicious lesions or rashes  NEURO: Normal strength and tone, mentation intact and speech normal  PSYCH: mentation appears normal, affect normal/bright    Diagnostic Test Results:  Results for orders placed or performed in visit on 07/09/19   TSH WITH FREE T4 REFLEX   Result Value Ref Range    TSH 1.44 0.40 - 4.00 mU/L   HEMOGLOBIN A1C   Result Value Ref Range    Hemoglobin A1C 7.3 (H) 0 - 5.6 %   BASIC METABOLIC PANEL   Result Value Ref Range    Sodium 139 133 - 144 mmol/L    Potassium 4.3 3.4 - 5.3 mmol/L    Chloride 105 94 - 109 mmol/L    Carbon Dioxide 23 20 - 32 mmol/L    Anion Gap 11 3 - 14 mmol/L    Glucose 129 (H) 70 - 99 mg/dL    Urea Nitrogen 18 7 - 30 mg/dL    Creatinine 0.94 0.66 - 1.25 mg/dL    GFR Estimate 80 >60 mL/min/[1.73_m2]    GFR Estimate If Black >90 >60 mL/min/[1.73_m2]    Calcium 9.6 8.5 - 10.1 mg/dL   Lipid panel reflex to direct LDL Fasting   Result Value Ref Range    Cholesterol 228 (H) <200 mg/dL    Triglycerides 189 (H) <150 " "mg/dL    HDL Cholesterol 49 >39 mg/dL    LDL Cholesterol Calculated 141 (H) <100 mg/dL    Non HDL Cholesterol 179 (H) <130 mg/dL   Albumin Random Urine Quantitative with Creat Ratio   Result Value Ref Range    Creatinine Urine 264 mg/dL    Albumin Urine mg/L 288 mg/L    Albumin Urine mg/g Cr 109.09 (H) 0 - 17 mg/g Cr           Assessment & Plan     1. Controlled type 2 diabetes mellitus without complication, with long-term current use of insulin (H)    - HEMOGLOBIN A1C  - BASIC METABOLIC PANEL  - Albumin Random Urine Quantitative with Creat Ratio  - metFORMIN (GLUCOPHAGE) 1000 MG tablet; Take 1 tablet (1,000 mg) by mouth 2 times daily (with meals)  Dispense: 180 tablet; Refill: 3  - insulin glargine (BASAGLAR KWIKPEN) 100 UNIT/ML pen; Inject 24 Units Subcutaneous At Bedtime  Dispense: 30 mL; Refill: 1    2. Hypertension goal BP (blood pressure) < 140/90    - BASIC METABOLIC PANEL  - Albumin Random Urine Quantitative with Creat Ratio    3. Hyperlipidemia LDL goal <70    - Lipid panel reflex to direct LDL Fasting  - atorvastatin (LIPITOR) 20 MG tablet; Take 1 tablet (20 mg) by mouth daily (with dinner)  Dispense: 90 tablet; Refill: 3       BMI:   Estimated body mass index is 29.24 kg/m  as calculated from the following:    Height as of this encounter: 1.753 m (5' 9\").    Weight as of this encounter: 89.8 kg (198 lb).   Weight management plan: diet and exercise.      Return in about 3 weeks (around 7/30/2019).    Vineet Franco MD  [facility]        "

## 2019-07-09 NOTE — PATIENT INSTRUCTIONS
At [facility], we strive to deliver an exceptional experience to you, every time we see you.  If you receive a survey in the mail, please send us back your thoughts. We really do value your feedback.    Based on your medical history, these are the current health maintenance/preventive care services that you are due for (some may have been done at this visit.)  Health Maintenance Due   Topic Date Due     TSH W/FREE T4 REFLEX  1945     DIABETIC FOOT EXAM  1945     HEPATITIS C SCREENING  1945     ADVANCE CARE PLANNING  1945     DTAP/TDAP/TD IMMUNIZATION (1 - Tdap) 01/26/1972     ZOSTER IMMUNIZATION (1 of 2) 01/26/1997     PNEUMOCOCCAL IMMUNIZATION 65+ LOW/MEDIUM RISK (2 of 2 - PPSV23) 05/16/2018     A1C  11/16/2018     PHQ-2  01/01/2019     MEDICARE ANNUAL WELLNESS VISIT  05/16/2019     BMP  05/16/2019     LIPID  05/16/2019     MICROALBUMIN  05/16/2019     FALL RISK ASSESSMENT  05/16/2019         Suggested websites for health information:  Www.[email or web address] : Up to date and easily searchable information on multiple topics.  Www.medlineplus.gov : medication info, interactive tutorials, watch real surgeries online  Www.familydoctor.org : good info from the Academy of Family Physicians  Www.cdc.gov : public health info, travel advisories, epidemics (H1N1)  Www.aap.org : children's health info, normal development, vaccinations  Www.health.state[place] : [place] dept of health, public health issues in [place], N1N1    Your care team:                            Family Medicine Internal Medicine   MD Vineet Munguia MD Shantel Branch-Fleming, MD Katya Georgiev PA-C Nam Ho, MD Pediatrics   YELENA Blanco, MD Nadia Dave CNP, MD Deborah Mielke, MD Kim Thein, APRN CNP      Clinic hours: Monday - Thursday 7 am-7 pm; Fridays 7 am-5 pm.   Urgent care: Monday - Friday 11 am-9 pm; Saturday and Sunday 9 am-5  pm.  Pharmacy : Monday -Thursday 8 am-8 pm; Friday 8 am-6 pm; Saturday and Sunday 9 am-5 pm.     Clinic: [phone]   Pharmacy: [phone]

## 2019-07-09 NOTE — LETTER
July 17, 2019      John Ayala  [address]    [facility] [address]        Dear ,    We are writing to inform you of your test results. Hemoglobin A1c is stable. Continue current regimen for diabetes. Urine albumin test is better and lower than your previous test. Lipid panel remains abnormal. Increase Atorvastatin to 40 mg once a day. Finish up your current supply by taking 2 tablets of Atorvastatin 20 mg before you  your new Rx for Atorvastatin 40 mg. TSH is normal which rules out thyroid disorders. Other than high glucose from diabetes, your chemistry panel shows normal kidney and normal liver function tests. Please follow-up within 6 months. For any questions, you may call my office at [phone].     Sincerely,  Vineet Franco MD/digna    Resulted Orders   TSH WITH FREE T4 REFLEX   Result Value Ref Range    TSH 1.44 0.40 - 4.00 mU/L   HEMOGLOBIN A1C   Result Value Ref Range    Hemoglobin A1C 7.3 (H) 0 - 5.6 %      Comment:      Normal <5.7% Prediabetes 5.7-6.4%  Diabetes 6.5% or higher - adopted from ADA   consensus guidelines.     BASIC METABOLIC PANEL   Result Value Ref Range    Sodium 139 133 - 144 mmol/L    Potassium 4.3 3.4 - 5.3 mmol/L    Chloride 105 94 - 109 mmol/L    Carbon Dioxide 23 20 - 32 mmol/L    Anion Gap 11 3 - 14 mmol/L    Glucose 129 (H) 70 - 99 mg/dL    Urea Nitrogen 18 7 - 30 mg/dL    Creatinine 0.94 0.66 - 1.25 mg/dL    GFR Estimate 80 >60 mL/min/[1.73_m2]      Comment:      Non  GFR Calc  Starting 12/18/2018, serum creatinine based estimated GFR (eGFR) will be   calculated using the Chronic Kidney Disease Epidemiology Collaboration   (CKD-EPI) equation.      GFR Estimate If Black >90 >60 mL/min/[1.73_m2]      Comment:       GFR Calc  Starting 12/18/2018, serum creatinine based estimated GFR (eGFR) will be   calculated using the Chronic Kidney Disease Epidemiology Collaboration   (CKD-EPI) equation.      Calcium  9.6 8.5 - 10.1 mg/dL   Lipid panel reflex to direct LDL Fasting   Result Value Ref Range    Cholesterol 228 (H) <200 mg/dL      Comment:      Desirable:       <200 mg/dl    Triglycerides 189 (H) <150 mg/dL      Comment:      Borderline high:  150-199 mg/dl  High:             200-499 mg/dl  Very high:       >499 mg/dl  Fasting specimen      HDL Cholesterol 49 >39 mg/dL    LDL Cholesterol Calculated 141 (H) <100 mg/dL      Comment:      Above desirable:  100-129 mg/dl  Borderline High:  130-159 mg/dL  High:             160-189 mg/dL  Very high:       >189 mg/dl      Non HDL Cholesterol 179 (H) <130 mg/dL      Comment:      Above Desirable:  130-159 mg/dl  Borderline high:  160-189 mg/dl  High:             190-219 mg/dl  Very high:       >219 mg/dl     Albumin Random Urine Quantitative with Creat Ratio   Result Value Ref Range    Creatinine Urine 264 mg/dL    Albumin Urine mg/L 288 mg/L    Albumin Urine mg/g Cr 109.09 (H) 0 - 17 mg/g Cr

## 2019-08-23 ENCOUNTER — OFFICE VISIT (OUTPATIENT)
Dept: FAMILY MEDICINE | Facility: CLINIC | Age: 74
End: 2019-08-23
Payer: COMMERCIAL

## 2019-08-23 VITALS
WEIGHT: 198 LBS | TEMPERATURE: 98.8 F | HEIGHT: 69 IN | SYSTOLIC BLOOD PRESSURE: 120 MMHG | BODY MASS INDEX: 29.33 KG/M2 | OXYGEN SATURATION: 97 % | HEART RATE: 107 BPM | DIASTOLIC BLOOD PRESSURE: 75 MMHG | RESPIRATION RATE: 16 BRPM

## 2019-08-23 DIAGNOSIS — Z01.818 PREOP GENERAL PHYSICAL EXAM: Primary | ICD-10-CM

## 2019-08-23 DIAGNOSIS — E78.5 HYPERLIPIDEMIA LDL GOAL <70: ICD-10-CM

## 2019-08-23 LAB
ALBUMIN SERPL-MCNC: 4.2 G/DL (ref 3.4–5)
ALP SERPL-CCNC: 110 U/L (ref 40–150)
ALT SERPL W P-5'-P-CCNC: 35 U/L (ref 0–70)
ANION GAP SERPL CALCULATED.3IONS-SCNC: 7 MMOL/L (ref 3–14)
AST SERPL W P-5'-P-CCNC: 27 U/L (ref 0–45)
BASOPHILS # BLD AUTO: 0 10E9/L (ref 0–0.2)
BASOPHILS NFR BLD AUTO: 0.2 %
BILIRUB SERPL-MCNC: 0.7 MG/DL (ref 0.2–1.3)
BUN SERPL-MCNC: 17 MG/DL (ref 7–30)
CALCIUM SERPL-MCNC: 9.5 MG/DL (ref 8.5–10.1)
CHLORIDE SERPL-SCNC: 104 MMOL/L (ref 94–109)
CO2 SERPL-SCNC: 28 MMOL/L (ref 20–32)
CREAT SERPL-MCNC: 0.86 MG/DL (ref 0.66–1.25)
DIFFERENTIAL METHOD BLD: NORMAL
EOSINOPHIL # BLD AUTO: 0.2 10E9/L (ref 0–0.7)
EOSINOPHIL NFR BLD AUTO: 2 %
ERYTHROCYTE [DISTWIDTH] IN BLOOD BY AUTOMATED COUNT: 13 % (ref 10–15)
GFR SERPL CREATININE-BSD FRML MDRD: 86 ML/MIN/{1.73_M2}
GLUCOSE SERPL-MCNC: 137 MG/DL (ref 70–99)
HCT VFR BLD AUTO: 47.7 % (ref 40–53)
HGB BLD-MCNC: 16.1 G/DL (ref 13.3–17.7)
INR PPP: 1.02 (ref 0.86–1.14)
LYMPHOCYTES # BLD AUTO: 1.7 10E9/L (ref 0.8–5.3)
LYMPHOCYTES NFR BLD AUTO: 19.6 %
MCH RBC QN AUTO: 31.9 PG (ref 26.5–33)
MCHC RBC AUTO-ENTMCNC: 33.8 G/DL (ref 31.5–36.5)
MCV RBC AUTO: 95 FL (ref 78–100)
MONOCYTES # BLD AUTO: 0.7 10E9/L (ref 0–1.3)
MONOCYTES NFR BLD AUTO: 7.9 %
NEUTROPHILS # BLD AUTO: 6 10E9/L (ref 1.6–8.3)
NEUTROPHILS NFR BLD AUTO: 70.3 %
PLATELET # BLD AUTO: 186 10E9/L (ref 150–450)
POTASSIUM SERPL-SCNC: 4.4 MMOL/L (ref 3.4–5.3)
PROT SERPL-MCNC: 8.1 G/DL (ref 6.8–8.8)
RBC # BLD AUTO: 5.04 10E12/L (ref 4.4–5.9)
SODIUM SERPL-SCNC: 139 MMOL/L (ref 133–144)
WBC # BLD AUTO: 8.6 10E9/L (ref 4–11)

## 2019-08-23 PROCEDURE — 85025 COMPLETE CBC W/AUTO DIFF WBC: CPT | Performed by: INTERNAL MEDICINE

## 2019-08-23 PROCEDURE — 99214 OFFICE O/P EST MOD 30 MIN: CPT | Performed by: INTERNAL MEDICINE

## 2019-08-23 PROCEDURE — 80053 COMPREHEN METABOLIC PANEL: CPT | Performed by: INTERNAL MEDICINE

## 2019-08-23 PROCEDURE — 36415 COLL VENOUS BLD VENIPUNCTURE: CPT | Performed by: INTERNAL MEDICINE

## 2019-08-23 PROCEDURE — 85610 PROTHROMBIN TIME: CPT | Performed by: INTERNAL MEDICINE

## 2019-08-23 RX ORDER — ATORVASTATIN CALCIUM 40 MG/1
40 TABLET, FILM COATED ORAL
Qty: 90 TABLET | Refills: 2 | Status: SHIPPED | OUTPATIENT
Start: 2019-08-23 | End: 2020-06-08

## 2019-08-23 RX ORDER — UBIDECARENONE 60 MG
1 CAPSULE ORAL DAILY
COMMUNITY

## 2019-08-23 ASSESSMENT — MIFFLIN-ST. JEOR: SCORE: 1638.5

## 2019-08-23 ASSESSMENT — PAIN SCALES - GENERAL: PAINLEVEL: NO PAIN (0)

## 2019-08-23 NOTE — PATIENT INSTRUCTIONS
Before Your Surgery      Call your surgeon if there is any change in your health. This includes signs of a cold or flu (such as a sore throat, runny nose, cough, rash or fever).    Do not smoke, drink alcohol or take over the counter medicine (unless your surgeon or primary care doctor tells you to) for the 24 hours before and after surgery.    If you take prescribed drugs: Follow your doctor s orders about which medicines to take and which to stop until after surgery.    Eating and drinking prior to surgery: follow the instructions from your surgeon    Take a shower or bath the night before surgery. Use the soap your surgeon gave you to gently clean your skin. If you do not have soap from your surgeon, use your regular soap. Do not shave or scrub the surgery site.  Wear clean pajamas and have clean sheets on your bed.     At [facility], we strive to deliver an exceptional experience to you, every time we see you.  If you receive a survey in the mail, please send us back your thoughts. We really do value your feedback.    Based on your medical history, these are the current health maintenance/preventive care services that you are due for (some may have been done at this visit.)  Health Maintenance Due   Topic Date Due     DIABETIC FOOT EXAM  1945     HEPATITIS C SCREENING  1945     ADVANCE CARE PLANNING  1945     DTAP/TDAP/TD IMMUNIZATION (1 - Tdap) 01/26/1972     ZOSTER IMMUNIZATION (1 of 2) 01/26/1997     PNEUMOCOCCAL IMMUNIZATION 65+ LOW/MEDIUM RISK (2 of 2 - PPSV23) 05/16/2018     PHQ-2  01/01/2019     MEDICARE ANNUAL WELLNESS VISIT  05/16/2019     FALL RISK ASSESSMENT  05/16/2019         Suggested websites for health information:  Www.[email or web address].org : Up to date and easily searchable information on multiple topics.  Www.medlineplus.gov : medication info, interactive tutorials, watch real surgeries online  Www.familydoctor.org : good info from the Academy of Family  Physicians  Www.cdc.gov : public health info, travel advisories, epidemics (H1N1)  Www.aap.org : children's health info, normal development, vaccinations  Www.health.[facility].[place].us : [place] dept of health, public health issues in [place], N1N1    Your care team:                            Family Medicine Internal Medicine   MD Vineet Munguia MD Shantel Branch-Fleming, MD Katya Georgiev PA-C Nam Ho, MD Pediatrics   YELENA Blanco, TREVER Oneil APRN MD Nadia Alvarez MD Deborah Mielke, MD Kim Thein, APRN CNP      Clinic hours: Monday - Thursday 7 am-7 pm; Fridays 7 am-5 pm.   Urgent care: Monday - Friday 11 am-9 pm; Saturday and Sunday 9 am-5 pm.  Pharmacy : Monday -Thursday 8 am-8 pm; Friday 8 am-6 pm; Saturday and Sunday 9 am-5 pm.     Clinic: [phone]   Pharmacy: [phone]

## 2019-08-23 NOTE — PROGRESS NOTES
[address] [address]  [phone]  Dept: [phone]    PRE-OP EVALUATION:  Today's date: 2019    John Ayala (: 1945) presents for pre-operative evaluation assessment as requested by Dr. Boles.  He requires evaluation and anesthesia risk assessment prior to undergoing surgery/procedure for treatment of cataracts, each eye.    Proposed Surgery/ Procedure: Cataract surgery  Date of Surgery/ Procedure: 2019 and 2019  Time of Surgery/ Procedure: 8:00 AM and 1:00 PM  Hospital/Surgical Facility: [facility]  Fax number for surgical facility: [phone]  Primary Physician: Vineet Franco  Type of Anesthesia Anticipated: to be determined    Patient has a Health Care Directive or Living Will:  NO    1. NO - Do you have a history of heart attack, stroke, stent, bypass or surgery on an artery in the head, neck, heart or legs?  2. NO - Do you ever have any pain or discomfort in your chest?  3. NO - Do you have a history of  Heart Failure?  4. NO - Are you troubled by shortness of breath when: walking on the level, up a slight hill or at night?  5. NO - Do you currently have a cold, bronchitis or other respiratory infection?  6. NO - Do you have a cough, shortness of breath or wheezing?  7. NO - Do you sometimes get pains in the calves of your legs when you walk?  8. NO - Do you or anyone in your family have previous history of blood clots?  9. NO - Do you or does anyone in your family have a serious bleeding problem such as prolonged bleeding following surgeries or cuts?  10. NO - Have you ever had problems with anemia or been told to take iron pills?  11. NO - Have you had any abnormal blood loss such as black, tarry or bloody stools, or abnormal vaginal bleeding?  12. NO - Have you ever had a blood transfusion?  13. NO - Have you or any of your relatives ever had problems with anesthesia?  14. NO - Do you have  sleep apnea, excessive snoring or daytime drowsiness?  15. NO - Do you have any prosthetic heart valves?  16. NO - Do you have prosthetic joints?  17. N/A - Is there any chance that you may be pregnant?        HPI:     HPI related to upcoming procedure:            This is a 74 year old male who comes in today for a preoperative evaluation. Mr. Ayala is scheduled to undergo cataract surgeries on 8/26/2019 and 9/20/2019.      DIABETES - Patient has controlled type 2 DM, currently on Basaglar insulin and Metformin and without complications.     HYPERLIPIDEMIA - Patient has a long history of significant Hyperlipidemia requiring medication for treatment with Atorvastatin, recently adjusted to 40 mg once a day. Patient reports no problems or side effects with the medication.       MEDICAL HISTORY:     Patient Active Problem List    Diagnosis Date Noted     Hyperlipidemia LDL goal <70 05/22/2018     Priority: Medium     Positive occult stool blood test 05/22/2018     Priority: Medium     Hypertension goal BP (blood pressure) < 140/90 05/16/2018     Priority: Medium     Fatty liver disease, nonalcoholic 08/31/2017     Priority: Medium     Pulmonary nodules 05/29/2017     Priority: Medium     Obstructive sleep apnea 05/16/2017     Priority: Medium     Uncontrolled type 2 diabetes mellitus without complication, without long-term current use of insulin (H) 05/16/2017     Priority: Medium     Overweight (BMI 25.0-29.9) 05/16/2017     Priority: Medium     Calcified granuloma of lung (H) 05/16/2017     Priority: Medium      History reviewed. No pertinent past medical history.  History reviewed. No pertinent surgical history.  Current Outpatient Medications   Medication Sig Dispense Refill     ACE/ARB NOT PRESCRIBED, INTENTIONAL, Please choose reason not prescribed, below       aspirin 81 MG tablet Take by mouth daily       atorvastatin (LIPITOR) 20 MG tablet Take 1 tablet (20 mg) by mouth daily (with dinner) (Patient taking  differently: Take 40 mg by mouth daily (with dinner) ) 90 tablet 3     blood glucose (NO BRAND SPECIFIED) lancets standard Use to test blood sugar two times daily or as directed. 1 Box 11     blood glucose monitoring (NO BRAND SPECIFIED) test strip Use to test blood sugars two times daily or as directed 100 strip 3     insulin glargine (BASAGLAR KWIKPEN) 100 UNIT/ML pen Inject 24 Units Subcutaneous At Bedtime 30 mL 1     insulin pen needle 31G X 6 MM Use once daily with Lantus. 100 each 3     metFORMIN (GLUCOPHAGE) 1000 MG tablet Take 1 tablet (1,000 mg) by mouth 2 times daily (with meals) 180 tablet 3     OTC products: Aspirin, Coenzyme Q-10 and multivitamins.    Allergies   Allergen Reactions     Tylenol [Acetaminophen] Shortness Of Breath, Hives and Swelling      Latex Allergy: NO    Social History     Tobacco Use     Smoking status: Former Smoker     Types: Cigarettes     Start date: 1/1/1997     Smokeless tobacco: Never Used   Substance Use Topics     Alcohol use: Yes     Comment: socially     History   Drug Use No       REVIEW OF SYSTEMS:   CONSTITUTIONAL: NEGATIVE for fever, chills, change in weight  INTEGUMENTARY/SKIN: NEGATIVE for worrisome rashes, moles or lesions  EYES: NEGATIVE for diplopia and photophobia  ENT/MOUTH: NEGATIVE for ear, mouth and throat problems  RESP: NEGATIVE for significant cough or SOB  BREAST: NEGATIVE for masses, tenderness or discharge  CV: NEGATIVE for chest pain, palpitations or peripheral edema  GI: NEGATIVE for nausea, abdominal pain, heartburn, or change in bowel habits  : NEGATIVE for frequency, dysuria, or hematuria  MUSCULOSKELETAL: NEGATIVE for significant arthralgias or myalgia  NEURO: NEGATIVE for weakness, dizziness or paresthesias  ENDOCRINE: NEGATIVE for temperature intolerance, skin/hair changes  HEME: NEGATIVE for bleeding problems  PSYCHIATRIC: NEGATIVE for changes in mood or affect    EXAM:   /75 (BP Location: Right arm, Patient Position: Chair, Cuff Size:  "Adult Large)   Pulse 107   Temp 98.8  F (37.1  C) (Oral)   Resp 16   Ht 1.753 m (5' 9\")   Wt 89.8 kg (198 lb)   SpO2 97%   BMI 29.24 kg/m    GENERAL APPEARANCE: healthy, alert and no distress  HEENT. Normocephalic  CV: regular rate and rhythm, normal S1 S2, no S3 or S4 and no murmur, click or rub   MS: extremities normal- no gross deformities noted  NEURO: Normal strength and tone, sensory exam grossly normal, mentation intact and speech normal  PSYCH: mentation appears normal and affect normal/bright    DIAGNOSTICS:   EKG: Not indicated due to non-vascular surgery and low risk of event (age <65 and without cardiac risk factors)  Lab tests obtained 8/23/2019  Component Value Flag Ref Range Units Status   WBC 8.6   4.0 - 11.0 10e9/L Final   RBC Count 5.04   4.4 - 5.9 10e12/L Final   Hemoglobin 16.1   13.3 - 17.7 g/dL Final   Hematocrit 47.7   40.0 - 53.0 % Final   MCV 95   78 - 100 fl Final   MCH 31.9   26.5 - 33.0 pg Final   MCHC 33.8   31.5 - 36.5 g/dL Final   RDW 13.0   10.0 - 15.0 % Final   Platelet Count 186   150 - 450 10e9/L Final   % Neutrophils 70.3    % Final   % Lymphocytes 19.6    % Final   % Monocytes 7.9    % Final   % Eosinophils 2.0    % Final   % Basophils 0.2    % Final   Absolute Neutrophil 6.0   1.6 - 8.3 10e9/L Final   Absolute Lymphocytes 1.7   0.8 - 5.3 10e9/L Final   Absolute Monocytes 0.7   0.0 - 1.3 10e9/L Final   Absolute Eosinophils 0.2   0.0 - 0.7 10e9/L Final   Absolute Basophils 0.0   0.0 - 0.2 10e9/L Final   Diff Method Automated Method     Final   8/23/2019  7:30 PM - Interface, Flexilab Results     Component Value Flag Ref Range Units Status   Sodium 139   133 - 144 mmol/L Final   Potassium 4.4   3.4 - 5.3 mmol/L Final   Chloride 104   94 - 109 mmol/L Final   Carbon Dioxide 28   20 - 32 mmol/L Final   Anion Gap 7   3 - 14 mmol/L Final   Glucose 137  High   70 - 99 mg/dL Final   Comment:   Non Fasting   Urea Nitrogen 17   7 - 30 mg/dL Final   Creatinine 0.86   0.66 - 1.25 mg/dL " Final   GFR Estimate 86   >60 mL/min/ Final   Comment:   Non  GFR Calc   Starting 12/18/2018, serum creatinine based estimated GFR (eGFR) will be   calculated using the Chronic Kidney Disease Epidemiology Collaboration   (CKD-EPI) equation.    GFR Estimate If Black >90   >60 mL/min/ Final   Comment:    GFR Calc   Starting 12/18/2018, serum creatinine based estimated GFR (eGFR) will be   calculated using the Chronic Kidney Disease Epidemiology Collaboration   (CKD-EPI) equation.    Calcium 9.5   8.5 - 10.1 mg/dL Final   Bilirubin Total 0.7   0.2 - 1.3 mg/dL Final   Albumin 4.2   3.4 - 5.0 g/dL Final   Protein Total 8.1   6.8 - 8.8 g/dL Final   Alkaline Phosphatase 110   40 - 150 U/L Final   ALT 35   0 - 70 U/L Final   AST 27   0 - 45 U/L Final     /23/2019  9:51 PM - Interface, Flexilab Results     Component Value Flag Ref Range Units Status   INR 1.02   0.86 - 1.14  Final     7/9/2019  2:14 PM - Interface, Flexilab Results     Component Value Flag Ref Range Units Status   Hemoglobin A1C 7.3  High   0 - 5.6 % Final   Comment:   Normal <5.7% Prediabetes 5.7-6.4%  Diabetes 6.5% or higher - adopted from ADA   consensus guidelines.        IMPRESSION:   Reason for surgery/procedure: Cataract surgery  Diagnosis/reason for consult: Preoperative evaluation.    The proposed surgical procedure is considered LOW risk.    REVISED CARDIAC RISK INDEX  The patient has the following serious cardiovascular risks for perioperative complications such as (MI, PE, VFib and 3  AV Block):  No serious cardiac risks  INTERPRETATION: 0 risks: Class I (very low risk - 0.4% complication rate)    The patient has the following additional risks for perioperative complications:  No identified additional risks      ICD-10-CM    1. Preop general physical exam Z01.818        RECOMMENDATIONS:     --Do NOT take the following medications of the morning of surgery:     1) Metformin.    2) Aspirin    3) Coenzyme Q10    4)  Multivitamins.    -Adjust the following medications the night before surgery:    1) Take only 12 units of Basaglar Kwikpen.    -May take the following medication the night before surgery:    1) Metformin    2) Atorvastatin      APPROVAL GIVEN to proceed with proposed procedure, without further diagnostic evaluation       Signed Electronically by: Vineet Franco MD    Copy of this evaluation report is provided to requesting physician.    [facility] Preop Guidelines    Revised Cardiac Risk Index

## 2020-01-09 ENCOUNTER — TRANSFERRED RECORDS (OUTPATIENT)
Dept: HEALTH INFORMATION MANAGEMENT | Facility: CLINIC | Age: 75
End: 2020-01-09

## 2020-01-21 ENCOUNTER — OFFICE VISIT (OUTPATIENT)
Dept: FAMILY MEDICINE | Facility: CLINIC | Age: 75
End: 2020-01-21
Payer: COMMERCIAL

## 2020-01-21 VITALS
HEIGHT: 69 IN | WEIGHT: 204 LBS | TEMPERATURE: 98.8 F | DIASTOLIC BLOOD PRESSURE: 72 MMHG | HEART RATE: 107 BPM | BODY MASS INDEX: 30.21 KG/M2 | SYSTOLIC BLOOD PRESSURE: 118 MMHG | OXYGEN SATURATION: 97 %

## 2020-01-21 DIAGNOSIS — Z23 PNEUMOCOCCAL VACCINE ADMINISTERED: ICD-10-CM

## 2020-01-21 DIAGNOSIS — Z79.4 TYPE 2 DIABETES MELLITUS WITH MICROALBUMINURIA, WITH LONG-TERM CURRENT USE OF INSULIN (H): Primary | ICD-10-CM

## 2020-01-21 DIAGNOSIS — E11.29 TYPE 2 DIABETES MELLITUS WITH MICROALBUMINURIA, WITH LONG-TERM CURRENT USE OF INSULIN (H): Primary | ICD-10-CM

## 2020-01-21 DIAGNOSIS — R80.9 TYPE 2 DIABETES MELLITUS WITH MICROALBUMINURIA, WITH LONG-TERM CURRENT USE OF INSULIN (H): Primary | ICD-10-CM

## 2020-01-21 LAB
GLUCOSE SERPL-MCNC: 129 MG/DL (ref 70–99)
HBA1C MFR BLD: 7.3 % (ref 0–5.6)

## 2020-01-21 PROCEDURE — 82947 ASSAY GLUCOSE BLOOD QUANT: CPT | Performed by: INTERNAL MEDICINE

## 2020-01-21 PROCEDURE — 36415 COLL VENOUS BLD VENIPUNCTURE: CPT | Performed by: INTERNAL MEDICINE

## 2020-01-21 PROCEDURE — 99213 OFFICE O/P EST LOW 20 MIN: CPT | Mod: 25 | Performed by: INTERNAL MEDICINE

## 2020-01-21 PROCEDURE — G0009 ADMIN PNEUMOCOCCAL VACCINE: HCPCS | Performed by: INTERNAL MEDICINE

## 2020-01-21 PROCEDURE — 90732 PPSV23 VACC 2 YRS+ SUBQ/IM: CPT | Performed by: INTERNAL MEDICINE

## 2020-01-21 PROCEDURE — 83036 HEMOGLOBIN GLYCOSYLATED A1C: CPT | Performed by: INTERNAL MEDICINE

## 2020-01-21 RX ORDER — PROCHLORPERAZINE 25 MG/1
1 SUPPOSITORY RECTAL
Qty: 1 EACH | Refills: 3 | Status: SHIPPED | OUTPATIENT
Start: 2020-01-21 | End: 2020-10-12

## 2020-01-21 RX ORDER — PROCHLORPERAZINE 25 MG/1
1 SUPPOSITORY RECTAL ONCE
Qty: 1 DEVICE | Refills: 0 | Status: SHIPPED | OUTPATIENT
Start: 2020-01-21 | End: 2020-01-21

## 2020-01-21 RX ORDER — PROCHLORPERAZINE 25 MG/1
1 SUPPOSITORY RECTAL
Qty: 3 EACH | Refills: 11 | Status: SHIPPED | OUTPATIENT
Start: 2020-01-21 | End: 2020-10-12

## 2020-01-21 RX ORDER — INSULIN GLARGINE 100 [IU]/ML
24 INJECTION, SOLUTION SUBCUTANEOUS AT BEDTIME
Qty: 30 ML | Refills: 1 | Status: SHIPPED | OUTPATIENT
Start: 2020-01-21 | End: 2020-08-24

## 2020-01-21 ASSESSMENT — MIFFLIN-ST. JEOR: SCORE: 1665.72

## 2020-01-21 ASSESSMENT — PAIN SCALES - GENERAL: PAINLEVEL: NO PAIN (0)

## 2020-01-21 NOTE — PROGRESS NOTES
Subjective     John Ayala is a 74 year old male who presents to clinic today for the following health issues:    HPI   Diabetes Follow-up    How often are you checking your blood sugar? One time daily  What time of day are you checking your blood sugars (select all that apply)?  Before meals  Have you had any blood sugars above 200?  No  Have you had any blood sugars below 70?  No    What symptoms do you notice when your blood sugar is low?  None    What concerns do you have today about your diabetes? None     Do you have any of these symptoms? (Select all that apply)  No numbness or tingling in feet.  No redness, sores or blisters on feet.  No complaints of excessive thirst.  No reports of blurry vision.  No significant changes to weight.    Have you had a diabetic eye exam in the last 12 months? Yes-  Location: 2/4/19        BP Readings from Last 2 Encounters:   01/21/20 (!) 140/90   08/23/19 120/75     Hemoglobin A1C (%)   Date Value   07/09/2019 7.3 (H)   05/16/2018 7.3 (H)     LDL Cholesterol Calculated (mg/dL)   Date Value   07/09/2019 141 (H)   05/16/2018 162 (H)                 How many servings of fruits and vegetables do you eat daily?  2-3    On average, how many sweetened beverages do you drink each day (Examples: soda, juice, sweet tea, etc.  Do NOT count diet or artificially sweetened beverages)?   0    How many days per week do you exercise enough to make your heart beat faster? 1-2    How many minutes a day do you exercise enough to make your heart beat faster? 10    How many days per week do you miss taking your medication? 0        Patient Active Problem List   Diagnosis     Obstructive sleep apnea     Type 2 diabetes mellitus with microalbuminuria, with long-term current use of insulin (H)     Overweight (BMI 25.0-29.9)     Calcified granuloma of lung (H)     Pulmonary nodules     Fatty liver disease, nonalcoholic     Hypertension goal BP (blood pressure) < 140/90     Hyperlipidemia LDL  goal <70     Positive occult stool blood test     History reviewed. No pertinent surgical history.    Social History     Tobacco Use     Smoking status: Former Smoker     Types: Cigarettes     Start date: 1/1/1997     Smokeless tobacco: Never Used   Substance Use Topics     Alcohol use: Yes     Comment: socially     History reviewed. No pertinent family history.      Allergies   Allergen Reactions     Tylenol [Acetaminophen] Shortness Of Breath, Hives and Swelling     Recent Labs   Lab Test 01/21/20  1537 08/23/19  1251 07/09/19  1401 05/16/18  1234 08/31/17  1114  05/16/17  1454   A1C 7.3*  --  7.3* 7.3* 6.3*   < >  --    LDL  --   --  141* 162*  --   --  133*   HDL  --   --  49 47  --   --  48   TRIG  --   --  189* 130  --   --  106   ALT  --  35  --  25 25   < >  --    CR  --  0.86 0.94 0.73 0.85   < >  --    GFRESTIMATED  --  86 80 >90 89   < >  --    GFRESTBLACK  --  >90 >90 >90 >90   < >  --    POTASSIUM  --  4.4 4.3 4.4 5.4*   < >  --    TSH  --   --  1.44  --   --   --   --     < > = values in this interval not displayed.      BP Readings from Last 3 Encounters:   01/21/20 118/72   08/23/19 120/75   07/09/19 144/70    Wt Readings from Last 3 Encounters:   01/21/20 92.5 kg (204 lb)   08/23/19 89.8 kg (198 lb)   07/09/19 89.8 kg (198 lb)                      Reviewed and updated as needed this visit by Provider         Review of Systems   ROS COMP: CONSTITUTIONAL: NEGATIVE for fever, chills, change in weight  INTEGUMENTARY/SKIN: NEGATIVE for worrisome rashes, moles or lesions  EYES: NEGATIVE for vision changes or irritation  ENT/MOUTH: NEGATIVE for ear, mouth and throat problems  RESP: NEGATIVE for significant cough or SOB  CV: NEGATIVE for chest pain, palpitations or peripheral edema  GI: NEGATIVE for nausea, abdominal pain, heartburn, or change in bowel habits  : NEGATIVE for frequency, dysuria, or hematuria  MUSCULOSKELETAL: NEGATIVE for significant arthralgias or myalgia  NEURO: NEGATIVE for weakness,  "dizziness or paresthesias  ENDOCRINE: NEGATIVE for temperature intolerance, skin/hair changes  HEME: NEGATIVE for bleeding problems  PSYCHIATRIC: NEGATIVE for changes in mood or affect      Objective    BP (!) 140/90 (BP Location: Right arm, Patient Position: Chair, Cuff Size: Adult Large)   Pulse 107   Temp 98.8  F (37.1  C) (Oral)   Ht 1.753 m (5' 9\")   Wt 92.5 kg (204 lb)   SpO2 97%   BMI 30.13 kg/m    Body mass index is 30.13 kg/m .  Physical Exam   GENERAL: healthy, alert and no distress  EYES: Eyes grossly normal to inspection, PERRL and conjunctivae and sclerae normal  HENT: ear canals and TM's normal, nose and mouth without ulcers or lesions  NECK: no adenopathy, no asymmetry, masses, or scars and thyroid normal to palpation  RESP: lungs clear to auscultation - no rales, rhonchi or wheezes  CV: regular rate and rhythm, normal S1 S2, no S3 or S4, no murmur, click or rub, no peripheral edema and peripheral pulses strong  ABDOMEN: soft, nontender, no hepatosplenomegaly, no masses and bowel sounds normal  MS: no gross musculoskeletal defects noted, no edema  SKIN: no suspicious lesions or rashes  NEURO: Normal strength and tone, mentation intact and speech normal  PSYCH: mentation appears normal, affect normal/bright    Diagnostic Test Results:  Results for orders placed or performed in visit on 01/21/20   HEMOGLOBIN A1C     Status: Abnormal   Result Value Ref Range    Hemoglobin A1C 7.3 (H) 0 - 5.6 %   Glucose     Status: Abnormal   Result Value Ref Range    Glucose 129 (H) 70 - 99 mg/dL           Assessment & Plan     1. Type 2 diabetes mellitus with microalbuminuria, with long-term current use of insulin (H)    - HEMOGLOBIN A1C  - Glucose  - insulin glargine (BASAGLAR KWIKPEN) 100 UNIT/ML pen; Inject 24 Units Subcutaneous At Bedtime  Dispense: 30 mL; Refill: 1  - Continuous Blood Gluc  (DEXCOM G6 ) ESTELLA; 1 each once for 1 dose  Dispense: 1 Device; Refill: 0  - Continuous Blood Gluc " "Transmit (DEXCOM G6 TRANSMITTER) MISC; 1 each every 3 months  Dispense: 1 each; Refill: 3  - Continuous Blood Gluc Sensor (DEXCOM G6 SENSOR) MISC; 1 each every 10 days  Dispense: 3 each; Refill: 11    2. Pneumococcal vaccine administered    - PPSV23, IM/SUBQ (2+ YRS) - [id number]     BMI:   Estimated body mass index is 30.13 kg/m  as calculated from the following:    Height as of this encounter: 1.753 m (5' 9\").    Weight as of this encounter: 92.5 kg (204 lb).   Weight management plan: diet and exercise.      Return in about 6 months (around 7/21/2020).    Vineet Franco MD  [facility]      "

## 2020-01-21 NOTE — NURSING NOTE
Prior to immunization administration, verified patients identity using patient s name and date of birth. Please see Immunization Activity for additional information.     Screening Questionnaire for Adult Immunization    Are you sick today?   No   Do you have allergies to medications, food, a vaccine component or latex?   No   Have you ever had a serious reaction after receiving a vaccination?   No   Do you have a long-term health problem with heart, lung, kidney, or metabolic disease (e.g., diabetes), asthma, a blood disorder, no spleen, complement component deficiency, a cochlear implant, or a spinal fluid leak?  Are you on long-term aspirin therapy?   No   Do you have cancer, leukemia, HIV/AIDS, or any other immune system problem?   No   Do you have a parent, brother, or sister with an immune system problem?   No   In the past 3 months, have you taken medications that affect  your immune system, such as prednisone, other steroids, or anticancer drugs; drugs for the treatment of rheumatoid arthritis, Crohn s disease, or psoriasis; or have you had radiation treatments?   No   Have you had a seizure, or a brain or other nervous system problem?   No   During the past year, have you received a transfusion of blood or blood    products, or been given immune (gamma) globulin or antiviral drug?   No   For women: Are you pregnant or is there a chance you could become       pregnant during the next month?   No   Have you received any vaccinations in the past 4 weeks?   No     Immunization questionnaire answers were all negative.        Per orders of Dr. Franco, injection of Pneumovax 23 given by Héctor Montejo MA. Patient instructed to remain in clinic for 15 minutes afterwards, and to report any adverse reaction to me immediately.       Screening performed by Héctor Montejo MA on 1/21/2020 at 4:43 PM.

## 2020-01-21 NOTE — PATIENT INSTRUCTIONS
At [facility], we strive to deliver an exceptional experience to you, every time we see you. If you receive a survey, please complete it as we do value your feedback.  If you have MyChart, you can expect to receive results automatically within 24 hours of their completion.  Your provider will send a note interpreting your results as well.   If you do not have MyChart, you should receive your results in about a week by mail.    Your care team:                            Family Medicine Internal Medicine   MD Vineet Munguia MD Shantel Branch-Fleming, MD Katya Georgiev PA-C Megan Hill, APRANNELIESE Hernandez, MD Pediatrics   Obed Hanson, PAMikkiC  Fela Ann, MD Elinor Gómez APRN CNP   MD Nadia Barrera MD Deborah Mielke, MD Kim Thein, APRN CNP      Clinic hours: Monday - Thursday 7 am-7 pm; Fridays 7 am-5 pm.   Urgent care: Monday - Friday 11 am-9 pm; Saturday and Sunday 9 am-5 pm.  Pharmacy : Monday -Thursday 8 am-8 pm; Friday 8 am-6 pm; Saturday and Sunday 9 am-5 pm.     Clinic: [phone]   Pharmacy: [phone]

## 2020-03-01 ENCOUNTER — TRANSFERRED RECORDS (OUTPATIENT)
Dept: HEALTH INFORMATION MANAGEMENT | Facility: CLINIC | Age: 75
End: 2020-03-01

## 2020-03-01 LAB — RETINOPATHY: NORMAL

## 2020-05-05 ENCOUNTER — VIRTUAL VISIT (OUTPATIENT)
Dept: FAMILY MEDICINE | Facility: CLINIC | Age: 75
End: 2020-05-05
Payer: COMMERCIAL

## 2020-05-05 ENCOUNTER — TELEPHONE (OUTPATIENT)
Dept: UROLOGY | Facility: CLINIC | Age: 75
End: 2020-05-05

## 2020-05-05 DIAGNOSIS — R31.0 GROSS HEMATURIA: Primary | ICD-10-CM

## 2020-05-05 PROCEDURE — 99213 OFFICE O/P EST LOW 20 MIN: CPT | Mod: 95 | Performed by: FAMILY MEDICINE

## 2020-05-05 NOTE — TELEPHONE ENCOUNTER
"Reason for call:  Symptom   Symptom or request: blood in urine    Duration (how long have symptoms been present): since last night but having \"strange feelings\" for a week  Have you been treated for this before? No    Additional comments: Dr. Hernandez referred patient. Please call to advise. Thank you    Phone number to reach patient:  Home number on file [phone] (home)    Best Time:  After 12 noon    Can we leave a detailed message on this number?  YES    Travel screening: Not Applicable    "

## 2020-05-05 NOTE — PROGRESS NOTES
"John Ayala is a 75 year old male who is being evaluated via a billable video visit.      The patient has been notified of following:     \"This video visit will be conducted via a call between you and your physician/provider. We have found that certain health care needs can be provided without the need for an in-person physical exam.  This service lets us provide the care you need with a video conversation.  If a prescription is necessary we can send it directly to your pharmacy.  If lab work is needed we can place an order for that and you can then stop by our lab to have the test done at a later time.    Video visits are billed at different rates depending on your insurance coverage.  Please reach out to your insurance provider with any questions.    If during the course of the call the physician/provider feels a video visit is not appropriate, you will not be charged for this service.\"    Patient has given verbal consent for Video visit? Yes    How would you like to obtain your AVS? Marijahart    Patient would like the video invitation sent by: Text to cell phone: [phone]    Will anyone else be joining your video visit? No    Subjective     John Ayala is a 75 year old male who presents to clinic today for the following health issues:    HPI  Genitourinary symptoms      Duration: one day    Description:  Blood in urine, clots    Intensity:  moderate    Accompanying signs and symptoms        (fever/discharge/nausea/vomiting/back or abdominal pain):  Patient states a strange feeling when urinating.    History (frequent UTI's/kidney stones/prostate problems): None  Sexually active: no     Precipitating or alleviating factors: None    Therapies tried and outcome: none   Outcome: NA      Video Start Time: 340PM      Patient Active Problem List   Diagnosis     Obstructive sleep apnea     Type 2 diabetes mellitus with microalbuminuria, with long-term current use of insulin (H)     Overweight (BMI " 25.0-29.9)     Calcified granuloma of lung (H)     Pulmonary nodules     Fatty liver disease, nonalcoholic     Hypertension goal BP (blood pressure) < 140/90     Hyperlipidemia LDL goal <70     Positive occult stool blood test     History reviewed. No pertinent surgical history.    Social History     Tobacco Use     Smoking status: Former Smoker     Types: Cigarettes     Start date: 1/1/1997     Smokeless tobacco: Never Used   Substance Use Topics     Alcohol use: Yes     Comment: socially     History reviewed. No pertinent family history.      Current Outpatient Medications   Medication Sig Dispense Refill     ACE/ARB NOT PRESCRIBED, INTENTIONAL, Please choose reason not prescribed, below       aspirin 81 MG tablet Take by mouth daily       atorvastatin (LIPITOR) 40 MG tablet Take 1 tablet (40 mg) by mouth daily (with dinner) 90 tablet 2     blood glucose (NO BRAND SPECIFIED) lancets standard Use to test blood sugar two times daily or as directed. 1 Box 11     blood glucose monitoring (NO BRAND SPECIFIED) test strip Use to test blood sugars two times daily or as directed 100 strip 3     Coenzyme Q-10 60 MG CAPS Take 1 capsule by mouth daily       Continuous Blood Gluc Sensor (DEXCOM G6 SENSOR) MISC 1 each every 10 days 3 each 11     Continuous Blood Gluc Transmit (DEXCOM G6 TRANSMITTER) MISC 1 each every 3 months 1 each 3     insulin glargine (BASAGLAR KWIKPEN) 100 UNIT/ML pen Inject 24 Units Subcutaneous At Bedtime 30 mL 1     insulin pen needle 31G X 6 MM Use once daily with Lantus. 100 each 3     metFORMIN (GLUCOPHAGE) 1000 MG tablet Take 1 tablet (1,000 mg) by mouth 2 times daily (with meals) 180 tablet 3     Multiple Vitamin (MULTIVITAMINS PO) Take 1 tablet by mouth daily       UNABLE TO FIND MEDICATION NAME: diotrust, ageless body capsule       UNABLE TO FIND MEDICATION NAME: Arthrozine -       UNABLE TO FIND MEDICATION NAME: bio - ?  Immune system, - tosin root, organic oregano leave, organic powder, amla  "extract.       Allergies   Allergen Reactions     Tylenol [Acetaminophen] Shortness Of Breath, Hives and Swelling     BP Readings from Last 3 Encounters:   01/21/20 118/72   08/23/19 120/75   07/09/19 144/70    Wt Readings from Last 3 Encounters:   01/21/20 92.5 kg (204 lb)   08/23/19 89.8 kg (198 lb)   07/09/19 89.8 kg (198 lb)                    Reviewed and updated as needed this visit by Provider         Review of Systems   ROS COMP: Constitutional, HEENT, cardiovascular, pulmonary, GI, , musculoskeletal, neuro, skin, endocrine and psych systems are negative, except as otherwise noted.      Objective    There were no vitals taken for this visit.  Estimated body mass index is 30.13 kg/m  as calculated from the following:    Height as of 1/21/20: 1.753 m (5' 9\").    Weight as of 1/21/20: 92.5 kg (204 lb).  Physical Exam     GENERAL: healthy, alert and no distress  EYES: Eyes grossly normal to inspection, conjunctivae and sclerae normal  RESP: no audible wheeze, cough, or visible cyanosis.  No visible retractions or increased work of breathing.  Able to speak fully in complete sentences.  NEURO: Cranial nerves grossly intact, mentation intact and speech normal  PSYCH: mentation appears normal, affect normal/bright, judgement and insight intact, normal speech and appearance well-groomed      Diagnostic Test Results:  Labs reviewed in Epic        Video-Visit Details    Type of service:  Video Visit    Video End Time:355PM    Originating Location (pt. Location): Home    Distant Location (provider location):  [facility]     Platform used for Video Visit: Satya     A/P:  (R31.0) Gross hematuria  (primary encounter diagnosis)  Comment:   Plan: UROLOGY ADULT REFERRAL        No UTI symptoms. AVM's, bladder ca. Patient history of ex-smoker. Patient referred to Urology for further evaluation and recommendations.        Return in about 3 months (around 8/5/2020) for Diabetes.       Evert Hernandez MD, " MD

## 2020-05-06 NOTE — TELEPHONE ENCOUNTER
Called and spoke to patient.   Patient is experiencing gross hematuria.   Per Dr. Garcia patient to complete ct scan prior to follow up in clinic.   Charity Porras RN

## 2020-05-12 ENCOUNTER — HOSPITAL ENCOUNTER (OUTPATIENT)
Dept: CT IMAGING | Facility: CLINIC | Age: 75
Discharge: HOME | End: 2020-05-12
Attending: UROLOGY | Admitting: UROLOGY
Payer: COMMERCIAL

## 2020-05-12 DIAGNOSIS — R31.0 GROSS HEMATURIA: ICD-10-CM

## 2020-05-12 LAB
CREAT BLD-MCNC: 0.9 MG/DL (ref 0.66–1.25)
GFR SERPL CREATININE-BSD FRML MDRD: 83 ML/MIN/{1.73_M2}

## 2020-05-12 PROCEDURE — 74178 CT ABD&PLV WO CNTR FLWD CNTR: CPT

## 2020-05-12 PROCEDURE — 25000125 ZZHC RX 250: Performed by: UROLOGY

## 2020-05-12 PROCEDURE — 82565 ASSAY OF CREATININE: CPT

## 2020-05-12 PROCEDURE — 25000128 H RX IP 250 OP 636: Performed by: UROLOGY

## 2020-05-12 RX ORDER — IOPAMIDOL 755 MG/ML
500 INJECTION, SOLUTION INTRAVASCULAR ONCE
Status: COMPLETED | OUTPATIENT
Start: 2020-05-12 | End: 2020-05-12

## 2020-05-12 RX ADMIN — SODIUM CHLORIDE 65 ML: 9 INJECTION, SOLUTION INTRAVENOUS at 11:20

## 2020-05-12 RX ADMIN — IOPAMIDOL 100 ML: 755 INJECTION, SOLUTION INTRAVENOUS at 11:20

## 2020-05-18 ENCOUNTER — OFFICE VISIT (OUTPATIENT)
Dept: UROLOGY | Facility: CLINIC | Age: 75
End: 2020-05-18
Payer: COMMERCIAL

## 2020-05-18 DIAGNOSIS — N30.91 HEMORRHAGIC CYSTITIS: ICD-10-CM

## 2020-05-18 DIAGNOSIS — R31.0 GROSS HEMATURIA: Primary | ICD-10-CM

## 2020-05-18 LAB
ALBUMIN UR-MCNC: NEGATIVE MG/DL
APPEARANCE UR: CLEAR
BILIRUB UR QL STRIP: NEGATIVE
COLOR UR AUTO: YELLOW
GLUCOSE UR STRIP-MCNC: >=1000 MG/DL
HGB UR QL STRIP: NEGATIVE
KETONES UR STRIP-MCNC: NEGATIVE MG/DL
LEUKOCYTE ESTERASE UR QL STRIP: NEGATIVE
NITRATE UR QL: NEGATIVE
PH UR STRIP: 5 PH (ref 5–7)
SOURCE: ABNORMAL
SP GR UR STRIP: 1.01 (ref 1–1.03)
UROBILINOGEN UR STRIP-ACNC: 0.2 EU/DL (ref 0.2–1)

## 2020-05-18 PROCEDURE — 81003 URINALYSIS AUTO W/O SCOPE: CPT | Performed by: UROLOGY

## 2020-05-18 PROCEDURE — 99203 OFFICE O/P NEW LOW 30 MIN: CPT | Mod: 25 | Performed by: UROLOGY

## 2020-05-18 PROCEDURE — 52000 CYSTOURETHROSCOPY: CPT | Performed by: UROLOGY

## 2020-05-18 RX ORDER — CIPROFLOXACIN 500 MG/1
500 TABLET, FILM COATED ORAL 2 TIMES DAILY
Qty: 14 TABLET | Refills: 0 | Status: SHIPPED | OUTPATIENT
Start: 2020-05-18 | End: 2020-10-12

## 2020-05-18 NOTE — PROGRESS NOTES
Urology Note            S:  John Ayala is a 75 year old male who was seen in a consultation at the request of Dr. Hernandez for hematuria.   Patient has gross hematuria.  He has no other voiding symptoms in addition to hematuria.  He has no flank pain.  He has no history of kidney stone.  He denies any trauma.  Recent CT urogram was normal.  No past medical history on file.  Current Outpatient Medications   Medication Sig Dispense Refill     ACE/ARB NOT PRESCRIBED, INTENTIONAL, Please choose reason not prescribed, below       aspirin 81 MG tablet Take by mouth daily       atorvastatin (LIPITOR) 40 MG tablet Take 1 tablet (40 mg) by mouth daily (with dinner) 90 tablet 2     blood glucose (NO BRAND SPECIFIED) lancets standard Use to test blood sugar two times daily or as directed. 1 Box 11     blood glucose monitoring (NO BRAND SPECIFIED) test strip Use to test blood sugars two times daily or as directed 100 strip 3     ciprofloxacin (CIPRO) 500 MG tablet Take 1 tablet (500 mg) by mouth 2 times daily 14 tablet 0     Coenzyme Q-10 60 MG CAPS Take 1 capsule by mouth daily       Continuous Blood Gluc Sensor (DEXCOM G6 SENSOR) MISC 1 each every 10 days 3 each 11     Continuous Blood Gluc Transmit (DEXCOM G6 TRANSMITTER) MISC 1 each every 3 months 1 each 3     insulin glargine (BASAGLAR KWIKPEN) 100 UNIT/ML pen Inject 24 Units Subcutaneous At Bedtime 30 mL 1     insulin pen needle 31G X 6 MM Use once daily with Lantus. 100 each 3     metFORMIN (GLUCOPHAGE) 1000 MG tablet Take 1 tablet (1,000 mg) by mouth 2 times daily (with meals) 180 tablet 3     Multiple Vitamin (MULTIVITAMINS PO) Take 1 tablet by mouth daily       UNABLE TO FIND MEDICATION NAME: diotrust, ageless body capsule       UNABLE TO FIND MEDICATION NAME: Arthrozine -       UNABLE TO FIND MEDICATION NAME: bio - ?  Immune system, - tosin root, organic oregano leave, organic powder, amla extract.       No past surgical history on file.   Social History      Socioeconomic History     Marital status: Single     Spouse name: Not on file     Number of children: Not on file     Years of education: Not on file     Highest education level: Not on file   Occupational History     Not on file   Social Needs     Financial resource strain: Not on file     Food insecurity     Worry: Not on file     Inability: Not on file     Transportation needs     Medical: Not on file     Non-medical: Not on file   Tobacco Use     Smoking status: Former Smoker     Types: Cigarettes     Start date: 1/1/1997     Smokeless tobacco: Never Used   Substance and Sexual Activity     Alcohol use: Yes     Comment: socially     Drug use: No     Sexual activity: Not Currently   Lifestyle     Physical activity     Days per week: Not on file     Minutes per session: Not on file     Stress: Not on file   Relationships     Social connections     Talks on phone: Not on file     Gets together: Not on file     Attends Zoroastrian service: Not on file     Active member of club or organization: Not on file     Attends meetings of clubs or organizations: Not on file     Relationship status: Not on file     Intimate partner violence     Fear of current or ex partner: Not on file     Emotionally abused: Not on file     Physically abused: Not on file     Forced sexual activity: Not on file   Other Topics Concern     Parent/sibling w/ CABG, MI or angioplasty before 65F 55M? Not Asked   Social History Narrative     Not on file     No family history on file.       REVIEW OF SYSTEMS  =================  C: NEGATIVE for fever, chills, change in weight  I: NEGATIVE for worrisome rashes, moles or lesions  E/M: NEGATIVE for ear, mouth and throat problems  R: NEGATIVE for significant cough or SHORTNESS OF BREATH  CV:  NEGATIVE for chest pain, palpitations or peripheral edema  GI: NEGATIVE for nausea, abdominal pain, heartburn, or change in bowel habits  NEURO: NEGATIVE numbness/weakness  : see HPI  PSYCH: NEGATIVE  depression/anxiety  LYmph: no new enlarged lymph nodes  Ortho: no new trauma/movements           O: Exam:There were no vitals taken for this visit.   Constitutional: healthy, alert and no distress  Cardiovascular: negative, PMI normal.   Respiratory: negative, no evidence of respiratory distress  Gastrointestinal: Abdomen soft, non-tender. BS normal. No masses, organomegaly  : penis no discharge. Testis no masses.  No scrotal skin lesion.    Musculoskeletal: extremities normal- no gross deformities noted, gait normal and normal muscle tone  Skin: no suspicious lesions or rashes  Neurologic: Alert and oriented  Musculaskeletal: moving all extremities  Psychiatric: mentation appears normal. and affect normal/bright  Hematologic/Lymphatic/Immunologic: normal ant/post cervical, axillary, supraclavicular and inguinal nodes  CT ABDOMEN AND PELVIS WITHOUT AND WITH CONTRAST  5/12/2020 11:42 AM      HISTORY: Hematuria, unknown cause. Gross hematuria.     COMPARISON: CT abdomen and pelvis 5/18/2018.     TECHNIQUE: Axial images are obtained from the lung bases to the  symphysis without IV contrast. Following the administration of 100 mL  of Isovue 370, additional axial images are obtained from the lung  bases to the symphysis during corticomedullary and excretory phases.  Coronal and sagittal reformatted images are also generated. Radiation  dose for this scan was reduced using automated exposure control,  adjustment of the mA and/or kV according to patient size, or iterative  reconstruction technique.     FINDINGS:  The lung bases appear clear.     Abdomen: Calcified granulomas are noted in the liver and spleen. No  evidence of fatty liver infiltration. No calcified gallstones. Spleen  is normal in size. The gallbladder, pancreas and adrenal glands are  unremarkable. No enlarged lymph nodes. Aorta is calcified without  aneurysm or dissection. No bowel obstruction. Appendix is normal.     Right urinary tract: No enhancing  renal mass. Medial lower pole right  renal cyst is noted on series 5, image 36 measuring 1.6 cm in  diameter. Probable tiny subcentimeter cyst posterior kidney on image  29. No collecting system or ureteral stones are present. There is no  hydronephrosis. Delayed imaging demonstrates a normal right renal  collecting system and ureter. Ureter is normal in caliber and course.     Left urinary tract: No enhancing renal mass. Medial mid left renal  cortical cyst is present on image 28. This measures 1.5 cm in  diameter. No collecting system or ureteral stones are present. There  is no hydronephrosis. Delayed imaging demonstrates a normal left renal  collecting system and ureter. Ureter is normal in caliber and course.     Bladder:     No bladder calculi are appreciated. No bladder wall thickening or  nodularity is appreciated. Delayed images show no evidence of filling  defects.     Pelvis:  Prostate gland is mildly enlarged. Rectum is unremarkable. No  pelvic mass or free fluid.                                                                      IMPRESSION:    1. No urinary tract calculi or hydronephrosis. Bilateral renal  cortical cysts are stable. No enhancing renal mass. Collecting  systems, ureters and bladder are unremarkable. No definite CT findings  to account for patient's hematuria.  2. Evidence of old granulomatous disease. Abdominal and pelvic organs  are otherwise within normal limits.  3. Mildly enlarged prostate gland, unchanged.     MICHAEL CARPIO MD   Order-Level Documents:     There are no order-level documents.   Lab and Collection     CT Abdomen Pelvis w/o & w Contrast [UDL305] (Order: [id number]) - 5/12/2020     Cysto done today         Patient is draped and prepped.  Flexible cystoscopy placed under direct vision.      The anterior urethra is normal   The prostatic urethra showed bilateral lobe enlargement.     The length is 3cm,  the coaptation is 3 cm.     In the bladder there are evidence of  hemorrhagic cystitis throughout.    Assessment/Plan:  (R31.0) Gross hematuria  (primary encounter diagnosis)  Comment:    Plan: CYSTOURETHROSCOPY (69172)             (N30.91) Hemorrhagic cystitis  Comment: UA/UC sent.    Plan: ciprofloxacin (CIPRO) 500 MG tablet        For 7 days.  Recheck UA/UC in 6 weeks.

## 2020-06-18 DIAGNOSIS — N30.91 HEMORRHAGIC CYSTITIS: ICD-10-CM

## 2020-06-18 LAB
ALBUMIN UR-MCNC: ABNORMAL MG/DL
APPEARANCE UR: CLEAR
BACTERIA #/AREA URNS HPF: ABNORMAL /HPF
BILIRUB UR QL STRIP: NEGATIVE
COLOR UR AUTO: YELLOW
GLUCOSE UR STRIP-MCNC: NEGATIVE MG/DL
HGB UR QL STRIP: NEGATIVE
KETONES UR STRIP-MCNC: NEGATIVE MG/DL
LEUKOCYTE ESTERASE UR QL STRIP: NEGATIVE
NITRATE UR QL: NEGATIVE
NON-SQ EPI CELLS #/AREA URNS LPF: ABNORMAL /LPF
PH UR STRIP: 6 PH (ref 5–7)
RBC #/AREA URNS AUTO: ABNORMAL /HPF
SOURCE: ABNORMAL
SP GR UR STRIP: 1.01 (ref 1–1.03)
UROBILINOGEN UR STRIP-ACNC: 0.2 EU/DL (ref 0.2–1)
WBC #/AREA URNS AUTO: ABNORMAL /HPF

## 2020-06-18 PROCEDURE — 81001 URINALYSIS AUTO W/SCOPE: CPT | Performed by: UROLOGY

## 2020-08-14 ENCOUNTER — TELEPHONE (OUTPATIENT)
Dept: FAMILY MEDICINE | Facility: CLINIC | Age: 75
End: 2020-08-14

## 2020-08-14 NOTE — TELEPHONE ENCOUNTER
Please call patient to see what he needs. There are usually several virtual open visits available with multiple providers per day. After gathering information if seems urgent or patient needs triage please route message to RNs otherwise assist him with virtual visit.    Keyla Mcgraw RN

## 2020-08-14 NOTE — TELEPHONE ENCOUNTER
Reason for Call:  Diabetes concern    Detailed comments: Patient would like a return call to discuss is care and medications, there was not an available appt until the 24th.    Please call patient and advise    Phone Number Patient can be reached at: Home number on file [phone] (home)    Best Time: any    Can we leave a detailed message on this number? YES    Call taken on 8/14/2020 at 3:46 PM by Kanu Blackman

## 2020-08-19 NOTE — TELEPHONE ENCOUNTER
Called and spoke with patient. He states have concerns about his left hip and his diabetes.   I scheduled him for virtual visit for 8/24, first available.     Haylee Terrazas MA

## 2020-08-24 ENCOUNTER — VIRTUAL VISIT (OUTPATIENT)
Dept: FAMILY MEDICINE | Facility: CLINIC | Age: 75
End: 2020-08-24
Payer: COMMERCIAL

## 2020-08-24 DIAGNOSIS — R80.9 TYPE 2 DIABETES MELLITUS WITH MICROALBUMINURIA, WITH LONG-TERM CURRENT USE OF INSULIN (H): ICD-10-CM

## 2020-08-24 DIAGNOSIS — M25.552 ARTHRALGIA OF LEFT HIP: Primary | ICD-10-CM

## 2020-08-24 DIAGNOSIS — E78.5 HYPERLIPIDEMIA LDL GOAL <70: ICD-10-CM

## 2020-08-24 DIAGNOSIS — Z79.4 TYPE 2 DIABETES MELLITUS WITH MICROALBUMINURIA, WITH LONG-TERM CURRENT USE OF INSULIN (H): ICD-10-CM

## 2020-08-24 DIAGNOSIS — E11.29 TYPE 2 DIABETES MELLITUS WITH MICROALBUMINURIA, WITH LONG-TERM CURRENT USE OF INSULIN (H): ICD-10-CM

## 2020-08-24 PROCEDURE — 99214 OFFICE O/P EST MOD 30 MIN: CPT | Mod: 95 | Performed by: INTERNAL MEDICINE

## 2020-08-24 RX ORDER — INSULIN GLARGINE 100 [IU]/ML
25 INJECTION, SOLUTION SUBCUTANEOUS 2 TIMES DAILY
Qty: 45 ML | Refills: 1 | Status: SHIPPED | OUTPATIENT
Start: 2020-08-24 | End: 2020-10-12

## 2020-08-24 RX ORDER — ATORVASTATIN CALCIUM 40 MG/1
TABLET, FILM COATED ORAL
Qty: 90 TABLET | Refills: 3 | Status: SHIPPED | OUTPATIENT
Start: 2020-08-24 | End: 2021-09-20

## 2020-08-24 ASSESSMENT — PAIN SCALES - GENERAL: PAINLEVEL: MILD PAIN (3)

## 2020-08-24 NOTE — PROGRESS NOTES
"John Ayala is a 75 year old male who is being evaluated via a billable telephone visit.      The patient has been notified of following:     \"This telephone visit will be conducted via a call between you and your physician/provider. We have found that certain health care needs can be provided without the need for a physical exam.  This service lets us provide the care you need with a short phone conversation.  If a prescription is necessary we can send it directly to your pharmacy.  If lab work is needed we can place an order for that and you can then stop by our lab to have the test done at a later time.    Telephone visits are billed at different rates depending on your insurance coverage. During this emergency period, for some insurers they may be billed the same as an in-person visit.  Please reach out to your insurance provider with any questions.    If during the course of the call the physician/provider feels a telephone visit is not appropriate, you will not be charged for this service.\"    Patient has given verbal consent for Telephone visit?  Yes    What phone number would you like to be contacted at? [phone]    How would you like to obtain your AVS? MyChart    Subjective     John Ayala is a 75 year old male who presents via phone visit today for the following health issues:    HPI     Patient has been lifting heavy loads, when standing on hard floors, after few minutes his left hip hurts. Left hip pain is worse, leading to reduced exercise tolerance. Walking is his primary form of exercise. Currently rates left hip pain at 3/10. Intermittent, but getting worse.    Patient's blood sugar is increased (ranging from 140-high 290s) due to reduced exercise tolerance and also concerned about medication not helping. He is forced to increase his dose of insulin to 18 units every AM and 18 units every PM, enough to keep his glucose to less than 200 mg/dl. Mr. Ayala is requesting refills for " it. The patient also does not see any results from Metformin.      Review of Systems   CONSTITUTIONAL: NEGATIVE for fever, chills, change in weight  INTEGUMENTARY/SKIN: NEGATIVE for worrisome rashes, moles or lesions  EYES: NEGATIVE for vision changes or irritation  ENT/MOUTH: NEGATIVE for ear, mouth and throat problems  RESP: NEGATIVE for significant cough or SOB  CV: NEGATIVE for chest pain, palpitations or peripheral edema  GI: NEGATIVE for nausea, abdominal pain, heartburn, or change in bowel habits  : NEGATIVE for frequency, dysuria, or hematuria  MUSCULOSKELETAL:As above.  NEURO: NEGATIVE for weakness, dizziness or paresthesias  ENDOCRINE: NEGATIVE for temperature intolerance, skin/hair changes  HEME: NEGATIVE for bleeding problems  PSYCHIATRIC: NEGATIVE for changes in mood or affect       Objective   Vitals - Patient Reported  Pain Score: Mild Pain (3)    Vitals:  No vitals were obtained today due to virtual visit.    Remainder of exam unable to be completed due to telephone visits    DIAGNOSTICS  Epic reviewed.        Assessment/Plan:  1. Type 2 diabetes mellitus with microalbuminuria, with long-term current use of insulin (H)    - Albumin Random Urine Quantitative with Creat Ratio; Standing  - HEMOGLOBIN A1C; Standing  - BASIC METABOLIC PANEL; Standing  - Hepatic panel (Albumin, ALT, AST, Bili, Alk Phos, TP); Standing  - insulin glargine (BASAGLAR KWIKPEN) 100 UNIT/ML pen; Inject 25 Units Subcutaneous 2 times daily  Dispense: 45 mL; Refill: 1    2. Hyperlipidemia LDL goal <70    - Lipid panel reflex to direct LDL Fasting; Standing  - Hepatic panel (Albumin, ALT, AST, Bili, Alk Phos, TP); Standing  - atorvastatin (LIPITOR) 40 MG tablet; TAKE 1 TABLET BY MOUTH ONCE DAILY WITH  DINNER  Dispense: 90 tablet; Refill: 3    3. Arthralgia of left hip    - XR Pelvis and Hip Left 2 Views; Future  - Orthopedic & Spine  Referral; Future      Vineet Franco MD  Internal Medicine    Follow-up in 6  months.    Phone call duration:  21 minutes  Start: 12:14 PM  End: 12:35 PM

## 2020-08-25 ENCOUNTER — HOSPITAL ENCOUNTER (OUTPATIENT)
Dept: GENERAL RADIOLOGY | Facility: CLINIC | Age: 75
Discharge: HOME | End: 2020-08-25
Attending: INTERNAL MEDICINE | Admitting: INTERNAL MEDICINE
Payer: COMMERCIAL

## 2020-08-25 DIAGNOSIS — M25.552 ARTHRALGIA OF LEFT HIP: ICD-10-CM

## 2020-08-25 PROCEDURE — 73502 X-RAY EXAM HIP UNI 2-3 VIEWS: CPT

## 2020-08-26 DIAGNOSIS — Z79.4 TYPE 2 DIABETES MELLITUS WITH MICROALBUMINURIA, WITH LONG-TERM CURRENT USE OF INSULIN (H): ICD-10-CM

## 2020-08-26 DIAGNOSIS — E11.29 TYPE 2 DIABETES MELLITUS WITH MICROALBUMINURIA, WITH LONG-TERM CURRENT USE OF INSULIN (H): ICD-10-CM

## 2020-08-26 DIAGNOSIS — R80.9 TYPE 2 DIABETES MELLITUS WITH MICROALBUMINURIA, WITH LONG-TERM CURRENT USE OF INSULIN (H): ICD-10-CM

## 2020-08-26 DIAGNOSIS — E78.5 HYPERLIPIDEMIA LDL GOAL <70: ICD-10-CM

## 2020-08-26 LAB — HBA1C MFR BLD: 9.2 % (ref 0–5.6)

## 2020-08-26 PROCEDURE — 82043 UR ALBUMIN QUANTITATIVE: CPT | Performed by: INTERNAL MEDICINE

## 2020-08-26 PROCEDURE — 36415 COLL VENOUS BLD VENIPUNCTURE: CPT | Performed by: INTERNAL MEDICINE

## 2020-08-26 PROCEDURE — 80048 BASIC METABOLIC PNL TOTAL CA: CPT | Performed by: INTERNAL MEDICINE

## 2020-08-26 PROCEDURE — 80061 LIPID PANEL: CPT | Performed by: INTERNAL MEDICINE

## 2020-08-26 PROCEDURE — 83036 HEMOGLOBIN GLYCOSYLATED A1C: CPT | Performed by: INTERNAL MEDICINE

## 2020-08-26 PROCEDURE — 80076 HEPATIC FUNCTION PANEL: CPT | Performed by: INTERNAL MEDICINE

## 2020-08-27 LAB
ALBUMIN SERPL-MCNC: 4.1 G/DL (ref 3.4–5)
ALP SERPL-CCNC: 111 U/L (ref 40–150)
ALT SERPL W P-5'-P-CCNC: 35 U/L (ref 0–70)
ANION GAP SERPL CALCULATED.3IONS-SCNC: 8 MMOL/L (ref 3–14)
AST SERPL W P-5'-P-CCNC: 24 U/L (ref 0–45)
BILIRUB DIRECT SERPL-MCNC: 0.2 MG/DL (ref 0–0.2)
BILIRUB SERPL-MCNC: 0.5 MG/DL (ref 0.2–1.3)
BUN SERPL-MCNC: 16 MG/DL (ref 7–30)
CALCIUM SERPL-MCNC: 9.4 MG/DL (ref 8.5–10.1)
CHLORIDE SERPL-SCNC: 105 MMOL/L (ref 94–109)
CHOLEST SERPL-MCNC: 133 MG/DL
CO2 SERPL-SCNC: 24 MMOL/L (ref 20–32)
CREAT SERPL-MCNC: 0.84 MG/DL (ref 0.66–1.25)
CREAT UR-MCNC: 102 MG/DL
GFR SERPL CREATININE-BSD FRML MDRD: 87 ML/MIN/{1.73_M2}
GLUCOSE SERPL-MCNC: 225 MG/DL (ref 70–99)
HDLC SERPL-MCNC: 57 MG/DL
LDLC SERPL CALC-MCNC: 56 MG/DL
MICROALBUMIN UR-MCNC: 229 MG/L
MICROALBUMIN/CREAT UR: 224.51 MG/G CR (ref 0–17)
NONHDLC SERPL-MCNC: 76 MG/DL
POTASSIUM SERPL-SCNC: 4.4 MMOL/L (ref 3.4–5.3)
PROT SERPL-MCNC: 7.9 G/DL (ref 6.8–8.8)
SODIUM SERPL-SCNC: 137 MMOL/L (ref 133–144)
TRIGL SERPL-MCNC: 98 MG/DL

## 2020-08-28 ENCOUNTER — TELEPHONE (OUTPATIENT)
Dept: FAMILY MEDICINE | Facility: CLINIC | Age: 75
End: 2020-08-28

## 2020-08-28 NOTE — TELEPHONE ENCOUNTER
Reason for Call:  Other prescription    Detailed comments: Patient has question about the medication ( diclofenac 1%) that was referred to him by the provider for a good over the counter solution     Phone Number Patient can be reached at: Cell number on file:    Telephone Information:   Mobile [phone]       Best Time: Anytime.    Can we leave a detailed message on this number? YES    Call taken on 8/28/2020 at 9:34 AM by Codi June

## 2020-08-28 NOTE — TELEPHONE ENCOUNTER
I spoke with Mr. Ayala and I encouraged him to still try Diclofenac 1% gel.    He is allergic to Acetaminophen.  Patient prefers to avoid oral NSAIDs.

## 2020-08-28 NOTE — TELEPHONE ENCOUNTER
Called and spoke with patient.    He picked up the OTC Diclofenac 1% gel, as instructed to trial per provider.  Patient stated he reviewed the package instructions and it states on box: Do not use on other parts of the body, like back and hip.  Patient said he was told to use this medication for his left hip pain. Should he ignore warning on box and proceed with using or should he not be using this at all.  Patient wanted to verify this first.  Writer was unable to locate this warning when reviewing medication information on Internet.     Routing to provider to review and advise.    Tonia Samano RN  [facility]

## 2020-09-03 ENCOUNTER — OFFICE VISIT (OUTPATIENT)
Dept: ORTHOPEDICS | Facility: CLINIC | Age: 75
End: 2020-09-03
Payer: COMMERCIAL

## 2020-09-03 VITALS
SYSTOLIC BLOOD PRESSURE: 120 MMHG | WEIGHT: 204 LBS | HEIGHT: 69 IN | DIASTOLIC BLOOD PRESSURE: 74 MMHG | BODY MASS INDEX: 30.21 KG/M2

## 2020-09-03 DIAGNOSIS — M25.552 ARTHRALGIA OF LEFT HIP: ICD-10-CM

## 2020-09-03 PROCEDURE — 99214 OFFICE O/P EST MOD 30 MIN: CPT | Performed by: FAMILY MEDICINE

## 2020-09-03 ASSESSMENT — MIFFLIN-ST. JEOR: SCORE: 1660.72

## 2020-09-03 NOTE — PROGRESS NOTES
"      [facility]  9/3/2020    John Ayala's chief complaint for this visit includes:  Chief Complaint   Patient presents with     Consult     left hip pain, no known injury, pain ongoing for a few years      PCP: Vineet Franco    Referring Provider:  Vineet Franco MD  [id number] [address]    /74   Ht 1.753 m (5' 9\")   Wt 92.5 kg (204 lb)   BMI 30.13 kg/m    Data Unavailable       Reason for visit:     What part of your body is injured / painful?  left hip    What caused the injury /pain? No inciting event     How long ago did your injury occur or pain begin? problem is longstanding    What are your most bothersome symptoms? Pain    How would you characterize your symptom?  aching    What makes your symptoms better? Rest    What makes your symptoms worse? Movement    Have you been previously seen for this problem? No    Medical History:    Any recent changes to your medical history? No    Any new medication prescribed since last visit? No    Have you had surgery on this body part before? No    Social History:    Occupation: Retired     Handedness: Right    Exercise: None    Review of Systems:    Do you have fever, chills, weight loss? No    Do you have any vision problems? No    Do you have any chest pain or edema? No    Do you have any shortness of breath or wheezing?  No    Do you have stomach problems? No    Do you have any urinary track issues? No    Do you have any numbness or focal weakness? No    Do you have diabetes? Yes,     Do you have problems with bleeding or clotting? No    Do you have an rashes or other skin lesions? No       CHIEF COMPLAINT:  Consult (left hip pain, no known injury, pain ongoing for a few years )       HISTORY OF PRESENT ILLNESS  Mr. Ayala is a pleasant 75 year old male who presents to clinic today with left hip pain.  Sea has had left hip pain for years.  No clear inciting event that he can recall.  He points to " the posterior aspect of his left hip.  Pain feels like it is deep inside.  He notices that when he is up on his feet for long periods of time his pain increases.  His pain gets better to some degree if he is carrying objects on this particular side, worse when he carries them on the contralateral side.  He denies any numbness or tingling that travels down his legs, no weakness.      Additional history: as documented    MEDICAL HISTORY  Patient Active Problem List   Diagnosis     Obstructive sleep apnea     Type 2 diabetes mellitus with microalbuminuria, with long-term current use of insulin (H)     Overweight (BMI 25.0-29.9)     Calcified granuloma of lung (H)     Pulmonary nodules     Fatty liver disease, nonalcoholic     Hypertension goal BP (blood pressure) < 140/90     Hyperlipidemia LDL goal <70     Positive occult stool blood test     Arthralgia of left hip       Current Outpatient Medications   Medication Sig Dispense Refill     ACE/ARB NOT PRESCRIBED, INTENTIONAL, Please choose reason not prescribed, below       aspirin 81 MG tablet Take by mouth daily       atorvastatin (LIPITOR) 40 MG tablet TAKE 1 TABLET BY MOUTH ONCE DAILY WITH  DINNER 90 tablet 3     blood glucose (NO BRAND SPECIFIED) lancets standard Use to test blood sugar two times daily or as directed. 1 Box 11     blood glucose monitoring (NO BRAND SPECIFIED) test strip Use to test blood sugars two times daily or as directed 100 strip 3     ciprofloxacin (CIPRO) 500 MG tablet Take 1 tablet (500 mg) by mouth 2 times daily 14 tablet 0     Coenzyme Q-10 60 MG CAPS Take 1 capsule by mouth daily       Continuous Blood Gluc Sensor (DEXCOM G6 SENSOR) MISC 1 each every 10 days 3 each 11     Continuous Blood Gluc Transmit (DEXCOM G6 TRANSMITTER) MISC 1 each every 3 months 1 each 3     insulin glargine (BASAGLAR KWIKPEN) 100 UNIT/ML pen Inject 25 Units Subcutaneous 2 times daily 45 mL 1     insulin pen needle 31G X 6 MM Use once daily with Lantus. 100 each 3  "    metFORMIN (GLUCOPHAGE) 1000 MG tablet Take 1 tablet (1,000 mg) by mouth 2 times daily (with meals) 180 tablet 1     Multiple Vitamin (MULTIVITAMINS PO) Take 1 tablet by mouth daily       UNABLE TO FIND MEDICATION NAME: diotrust, ageless body capsule       UNABLE TO FIND MEDICATION NAME: Arthrozine -       UNABLE TO FIND MEDICATION NAME: bio - ?  Immune system, - tosin root, organic oregano leave, organic powder, amla extract.         Allergies   Allergen Reactions     Tylenol [Acetaminophen] Shortness Of Breath, Hives and Swelling       No family history on file.    Additional medical/Social/Surgical histories reviewed in [facility] and updated as appropriate.        PHYSICAL EXAM    Vitals:    09/03/20 1329   BP: 120/74   Weight: 92.5 kg (204 lb)   Height: 1.753 m (5' 9\")     General  - normal appearance, in no obvious distress  HEENT  - conjunctivae not injected, moist mucous membranes  CV  - normal femoral pulse  Pulm  - normal respiratory pattern, non-labored  Musculoskeletal - left hip  - stance: Gait favors left side  - inspection: no swelling or effusion, normal bone and joint alignment, no obvious deformity  - palpation: tender over the lateral hip musculature  - ROM: normal and painless flexion, extension, IR, ER, adduction  - strength: 5/5 in all planes  - special tests:  (-) SOILA  (-) FADIR, although refers pain posteriorly  no pain with axial femoral load  Neuro  - no sensory or motor deficit, grossly normal coordination, normal muscle tone  Skin  - no ecchymosis, erythema, warmth, or induration, no obvious rash  Psych  - interactive, appropriate, normal mood and affect             ASSESSMENT & PLAN  Mr. Ayala is a 75 year old male who presents to clinic today with left posterior hip pain.    I reviewed his x-ray in the room with him, Sea does have osteophytosis throughout with mild to moderate osteoarthritic change.    I do think that Sea's pain pattern more raises the concern for a " posterior-lateral, tendinous hip issue, as opposed to an intra-articular issue, although it is difficult to completely rule this out.    I am referring Sea to physical therapy.  If he does not find any improvement whatsoever over the coming month to 6 weeks I would consider injecting his hip joint on a diagnostic and therapeutic basis.    It was a pleasure seeing Sea today.    Panda Keller DO, [facility]  Primary [facility] Sports Medicine      This note was constructed using Dragon dictation software, please excuse any minor errors in spelling, grammar, or syntax.

## 2020-09-03 NOTE — LETTER
"    9/3/2020         RE: John Ayala  [address]  [address]  [facility] [address]        Dear Colleague,    Thank you for referring your patient, John Ayala, to the [facility]. Please see a copy of my visit note below.          [facility]  9/3/2020    John Ayala's chief complaint for this visit includes:  Chief Complaint   Patient presents with     Consult     left hip pain, no known injury, pain ongoing for a few years      PCP: Vineet Franco    Referring Provider:  Vineet Franco MD  [id number] [place] [address]    /74   Ht 1.753 m (5' 9\")   Wt 92.5 kg (204 lb)   BMI 30.13 kg/m    Data Unavailable       Reason for visit:     What part of your body is injured / painful?  left hip    What caused the injury /pain? No inciting event     How long ago did your injury occur or pain begin? problem is longstanding    What are your most bothersome symptoms? Pain    How would you characterize your symptom?  aching    What makes your symptoms better? Rest    What makes your symptoms worse? Movement    Have you been previously seen for this problem? No    Medical History:    Any recent changes to your medical history? No    Any new medication prescribed since last visit? No    Have you had surgery on this body part before? No    Social History:    Occupation: Retired     Handedness: Right    Exercise: None    Review of Systems:    Do you have fever, chills, weight loss? No    Do you have any vision problems? No    Do you have any chest pain or edema? No    Do you have any shortness of breath or wheezing?  No    Do you have stomach problems? No    Do you have any urinary track issues? No    Do you have any numbness or focal weakness? No    Do you have diabetes? Yes,     Do you have problems with bleeding or clotting? No    Do you have an rashes or other skin lesions? No       CHIEF COMPLAINT:  Consult (left hip pain, no known " injury, pain ongoing for a few years )       HISTORY OF PRESENT ILLNESS  Mr. Ayala is a pleasant 75 year old male who presents to clinic today with left hip pain.  Sea has had left hip pain for years.  No clear inciting event that he can recall.  He points to the posterior aspect of his left hip.  Pain feels like it is deep inside.  He notices that when he is up on his feet for long periods of time his pain increases.  His pain gets better to some degree if he is carrying objects on this particular side, worse when he carries them on the contralateral side.  He denies any numbness or tingling that travels down his legs, no weakness.      Additional history: as documented    MEDICAL HISTORY  Patient Active Problem List   Diagnosis     Obstructive sleep apnea     Type 2 diabetes mellitus with microalbuminuria, with long-term current use of insulin (H)     Overweight (BMI 25.0-29.9)     Calcified granuloma of lung (H)     Pulmonary nodules     Fatty liver disease, nonalcoholic     Hypertension goal BP (blood pressure) < 140/90     Hyperlipidemia LDL goal <70     Positive occult stool blood test     Arthralgia of left hip       Current Outpatient Medications   Medication Sig Dispense Refill     ACE/ARB NOT PRESCRIBED, INTENTIONAL, Please choose reason not prescribed, below       aspirin 81 MG tablet Take by mouth daily       atorvastatin (LIPITOR) 40 MG tablet TAKE 1 TABLET BY MOUTH ONCE DAILY WITH  DINNER 90 tablet 3     blood glucose (NO BRAND SPECIFIED) lancets standard Use to test blood sugar two times daily or as directed. 1 Box 11     blood glucose monitoring (NO BRAND SPECIFIED) test strip Use to test blood sugars two times daily or as directed 100 strip 3     ciprofloxacin (CIPRO) 500 MG tablet Take 1 tablet (500 mg) by mouth 2 times daily 14 tablet 0     Coenzyme Q-10 60 MG CAPS Take 1 capsule by mouth daily       Continuous Blood Gluc Sensor (DEXCOM G6 SENSOR) MISC 1 each every 10 days 3 each 11      "Continuous Blood Gluc Transmit (DEXCOM G6 TRANSMITTER) MISC 1 each every 3 months 1 each 3     insulin glargine (BASAGLAR KWIKPEN) 100 UNIT/ML pen Inject 25 Units Subcutaneous 2 times daily 45 mL 1     insulin pen needle 31G X 6 MM Use once daily with Lantus. 100 each 3     metFORMIN (GLUCOPHAGE) 1000 MG tablet Take 1 tablet (1,000 mg) by mouth 2 times daily (with meals) 180 tablet 1     Multiple Vitamin (MULTIVITAMINS PO) Take 1 tablet by mouth daily       UNABLE TO FIND MEDICATION NAME: diotrust, ageless body capsule       UNABLE TO FIND MEDICATION NAME: Arthrozine -       UNABLE TO FIND MEDICATION NAME: bio - ?  Immune system, - tosin root, organic oregano leave, organic powder, amla extract.         Allergies   Allergen Reactions     Tylenol [Acetaminophen] Shortness Of Breath, Hives and Swelling       No family history on file.    Additional medical/Social/Surgical histories reviewed in [facility] and updated as appropriate.        PHYSICAL EXAM    Vitals:    09/03/20 1329   BP: 120/74   Weight: 92.5 kg (204 lb)   Height: 1.753 m (5' 9\")     General  - normal appearance, in no obvious distress  HEENT  - conjunctivae not injected, moist mucous membranes  CV  - normal femoral pulse  Pulm  - normal respiratory pattern, non-labored  Musculoskeletal - left hip  - stance: Gait favors left side  - inspection: no swelling or effusion, normal bone and joint alignment, no obvious deformity  - palpation: tender over the lateral hip musculature  - ROM: normal and painless flexion, extension, IR, ER, adduction  - strength: 5/5 in all planes  - special tests:  (-) SOILA  (-) FADIR, although refers pain posteriorly  no pain with axial femoral load  Neuro  - no sensory or motor deficit, grossly normal coordination, normal muscle tone  Skin  - no ecchymosis, erythema, warmth, or induration, no obvious rash  Psych  - interactive, appropriate, normal mood and affect             ASSESSMENT & PLAN  Mr. Ayala is a 75 year old male " who presents to clinic today with left posterior hip pain.    I reviewed his x-ray in the room with him, Sea does have osteophytosis throughout with mild to moderate osteoarthritic change.    I do think that Sea's pain pattern more raises the concern for a posterior-lateral, tendinous hip issue, as opposed to an intra-articular issue, although it is difficult to completely rule this out.    I am referring Sea to physical therapy.  If he does not find any improvement whatsoever over the coming month to 6 weeks I would consider injecting his hip joint on a diagnostic and therapeutic basis.    It was a pleasure seeing Sea today.    Panda Keller DO, [facility]  [facility] Medicine      This note was constructed using Dragon dictation software, please excuse any minor errors in spelling, grammar, or syntax.      Again, thank you for allowing me to participate in the care of your patient.        Sincerely,        Panda Keller DO

## 2020-09-08 ENCOUNTER — THERAPY VISIT (OUTPATIENT)
Dept: PHYSICAL THERAPY | Facility: CLINIC | Age: 75
End: 2020-09-08
Attending: FAMILY MEDICINE
Payer: COMMERCIAL

## 2020-09-08 DIAGNOSIS — M25.552 HIP PAIN, LEFT: Primary | ICD-10-CM

## 2020-09-08 DIAGNOSIS — M25.552 ARTHRALGIA OF LEFT HIP: ICD-10-CM

## 2020-09-08 PROCEDURE — 97110 THERAPEUTIC EXERCISES: CPT | Mod: GP | Performed by: PHYSICAL THERAPIST

## 2020-09-08 PROCEDURE — 97161 PT EVAL LOW COMPLEX 20 MIN: CPT | Mod: GP | Performed by: PHYSICAL THERAPIST

## 2020-09-08 ASSESSMENT — ACTIVITIES OF DAILY LIVING (ADL)
PUTTING_ON_SOCKS_AND_SHOES: MODERATE DIFFICULTY
HEAVY_WORK: MODERATE DIFFICULTY
WALKING_UP_STEEP_HILLS: SLIGHT DIFFICULTY
GETTING_INTO_AND_OUT_OF_A_BATHTUB: MODERATE DIFFICULTY
HOS_ADL_COUNT: 17
WALKING_15_MINUTES_OR_GREATER: EXTREME DIFFICULTY
GOING_UP_1_FLIGHT_OF_STAIRS: SLIGHT DIFFICULTY
TWISTING/PIVOTING_ON_INVOLVED_LEG: SLIGHT DIFFICULTY
DEEP_SQUATTING: MODERATE DIFFICULTY
LIGHT_TO_MODERATE_WORK: SLIGHT DIFFICULTY
HOS_ADL_SCORE(%): 61.76
SITTING_FOR_15_MINUTES: NO DIFFICULTY AT ALL
HOS_ADL_ITEM_SCORE_TOTAL: 42
STANDING_FOR_15_MINUTES: MODERATE DIFFICULTY
GETTING_INTO_AND_OUT_OF_AN_AVERAGE_CAR: SLIGHT DIFFICULTY
WALKING_DOWN_STEEP_HILLS: SLIGHT DIFFICULTY
STEPPING_UP_AND_DOWN_CURBS: SLIGHT DIFFICULTY
GOING_DOWN_1_FLIGHT_OF_STAIRS: SLIGHT DIFFICULTY
HOS_ADL_HIGHEST_POTENTIAL_SCORE: 68
HOW_WOULD_YOU_RATE_YOUR_CURRENT_LEVEL_OF_FUNCTION_DURING_YOUR_USUAL_ACTIVITIES_OF_DAILY_LIVING_FROM_0_TO_100_WITH_100_BEING_YOUR_LEVEL_OF_FUNCTION_PRIOR_TO_YOUR_HIP_PROBLEM_AND_0_BEING_THE_INABILITY_TO_PERFORM_ANY_OF_YOUR_USUAL_DAILY_ACTIVITIES?: 63
WALKING_APPROXIMATELY_10_MINUTES: MODERATE DIFFICULTY
ROLLING_OVER_IN_BED: SLIGHT DIFFICULTY
WALKING_INITIALLY: MODERATE DIFFICULTY
RECREATIONAL_ACTIVITIES: MODERATE DIFFICULTY

## 2020-09-08 NOTE — PROGRESS NOTES
[facility] Initial Evaluation  Subjective:  Physical Therapy Initial Evaluation   9/8/2020   Precautions/Restrictions/MD instructions: PT eval and treat   Therapist Impression:   Pt is a 74 y/o male, with chronic history of left hip pain/OA . Pt presents with pain, decreased ROM/mobility, poor balance and decreased strength consistent with hip OA. These impairments limit their ability to walk, and go up and down stairs. Skilled PT services necessary in order to reduce impairments and improve independent function.    Subjective:   Chief Complaint: left hip pain, OA noted in xrays. Not able to walk prolonged periods.  DOI/onset: 9/1/2020  DOS: none   Location: L hip/ posterior  Quality: dull at times, and sharp others  Frequency: intermittent  Radiates: none to note  Pain scale: Rest 0/10 Activity 8/10    Sleeping: not affected    Exacerbated by: walking; stairs  Relieved by: pain  Progression: worse   Previous Treatment: none  Effect of prior treatment: n/a   PMH and/or surgical history: none   Imaging: Xray (OA)     Occupation: retired  Job duties/hobbies: walking   Current HEP/exercise regimen: would like to get back to walking painfree  Patient's goals: walking painfree   Medications:  insulin, statin, metformin  General health as reported by patient: fair/good  Return to MD: as needed   Red Flags: Diabetic       HPI                    Objective:  HIP:  Sitting posture: lean to right (offloading L hip)      SL Balance:   R: <5 sec (L hip drop)  L: <5 sec (R hip drop)    Gait: lacking hip extension bilaterally, hip drop on right (slight)      PROM: (* indicates patient's pain)   L  R   Flexion 90* 95   Extension 5 5   Abduction 40 40   IR (supine 90-90) 0** 0   ER (supine 90-90) 30* 35     Strength:   L R   HIP     Flex 4-/5* 4/5   Ext 4/5* 4/5   Add (0-45-90 degrees: supine) nt nt   Abd 3+/5* 4-/5         Palpation: very tender to palpation over glute max and glute med on  left        System    Physical Exam    General     ROS    Assessment/Plan:    Patient is a 75 year old male with left side hip complaints.    Patient has the following significant findings with corresponding treatment plan.                Diagnosis 1:  Left hip pain/ hip arthritis   Pain -  US, manual therapy, self management, education, directional preference exercise and home program  Decreased ROM/flexibility - manual therapy and therapeutic exercise  Decreased joint mobility - manual therapy and therapeutic exercise  Decreased strength - therapeutic exercise and therapeutic activities  Impaired balance - neuro re-education and therapeutic activities  Decreased proprioception - neuro re-education and therapeutic activities  Inflammation - cold therapy and self management/home program  Impaired gait - gait training  Impaired muscle performance - neuro re-education  Decreased function - therapeutic activities  Impaired posture - neuro re-education  Instability -  Therapeutic Activity  Therapeutic Exercise    Therapy Evaluation Codes:     Cumulative Therapy Evaluation is: Low complexity.    Previous and current functional limitations:  (See Goal Flow Sheet for this information)    Short term and Long term goals: (See Goal Flow Sheet for this information)     Communication ability:  Patient appears to be able to clearly communicate and understand verbal and written communication and follow directions correctly.  Treatment Explanation - The following has been discussed with the patient:   RX ordered/plan of care  Anticipated outcomes  Possible risks and side effects  This patient would benefit from PT intervention to resume normal activities.   Rehab potential is good.    Frequency:  1 X week, once daily  Duration:  for 6 weeks  Discharge Plan:  Achieve all LTG.  Independent in home treatment program.  Reach maximal therapeutic benefit.    Please refer to the daily flowsheet for treatment today, total treatment time  and time spent performing 1:1 timed codes.

## 2020-09-17 ENCOUNTER — THERAPY VISIT (OUTPATIENT)
Dept: PHYSICAL THERAPY | Facility: CLINIC | Age: 75
End: 2020-09-17
Payer: COMMERCIAL

## 2020-09-17 DIAGNOSIS — M25.552 ARTHRALGIA OF LEFT HIP: Primary | ICD-10-CM

## 2020-09-17 DIAGNOSIS — M25.552 HIP PAIN, LEFT: ICD-10-CM

## 2020-09-17 PROCEDURE — 97110 THERAPEUTIC EXERCISES: CPT | Mod: GP | Performed by: PHYSICAL THERAPIST

## 2020-09-17 PROCEDURE — 97112 NEUROMUSCULAR REEDUCATION: CPT | Mod: GP | Performed by: PHYSICAL THERAPIST

## 2020-09-24 ENCOUNTER — THERAPY VISIT (OUTPATIENT)
Dept: PHYSICAL THERAPY | Facility: CLINIC | Age: 75
End: 2020-09-24
Payer: COMMERCIAL

## 2020-09-24 DIAGNOSIS — M25.552 ARTHRALGIA OF LEFT HIP: Primary | ICD-10-CM

## 2020-09-24 PROCEDURE — 97112 NEUROMUSCULAR REEDUCATION: CPT | Mod: GP | Performed by: PHYSICAL THERAPIST

## 2020-09-24 PROCEDURE — 97110 THERAPEUTIC EXERCISES: CPT | Mod: GP | Performed by: PHYSICAL THERAPIST

## 2020-09-24 ASSESSMENT — ACTIVITIES OF DAILY LIVING (ADL)
STANDING_FOR_15_MINUTES: SLIGHT DIFFICULTY
ROLLING_OVER_IN_BED: NO DIFFICULTY AT ALL
HEAVY_WORK: SLIGHT DIFFICULTY
WALKING_APPROXIMATELY_10_MINUTES: NO DIFFICULTY AT ALL
HOW_WOULD_YOU_RATE_YOUR_CURRENT_LEVEL_OF_FUNCTION_DURING_YOUR_USUAL_ACTIVITIES_OF_DAILY_LIVING_FROM_0_TO_100_WITH_100_BEING_YOUR_LEVEL_OF_FUNCTION_PRIOR_TO_YOUR_HIP_PROBLEM_AND_0_BEING_THE_INABILITY_TO_PERFORM_ANY_OF_YOUR_USUAL_DAILY_ACTIVITIES?: 78
SITTING_FOR_15_MINUTES: NO DIFFICULTY AT ALL
HOS_ADL_ITEM_SCORE_TOTAL: 62
HOS_ADL_HIGHEST_POTENTIAL_SCORE: 68
GOING_UP_1_FLIGHT_OF_STAIRS: NO DIFFICULTY AT ALL
HOS_ADL_SCORE(%): 91.18
LIGHT_TO_MODERATE_WORK: NO DIFFICULTY AT ALL
DEEP_SQUATTING: SLIGHT DIFFICULTY
GETTING_INTO_AND_OUT_OF_AN_AVERAGE_CAR: NO DIFFICULTY AT ALL
WALKING_DOWN_STEEP_HILLS: NO DIFFICULTY AT ALL
TWISTING/PIVOTING_ON_INVOLVED_LEG: NO DIFFICULTY AT ALL
RECREATIONAL_ACTIVITIES: SLIGHT DIFFICULTY
STEPPING_UP_AND_DOWN_CURBS: NO DIFFICULTY AT ALL
PUTTING_ON_SOCKS_AND_SHOES: SLIGHT DIFFICULTY
GETTING_INTO_AND_OUT_OF_A_BATHTUB: NO DIFFICULTY AT ALL
WALKING_INITIALLY: NO DIFFICULTY AT ALL
WALKING_UP_STEEP_HILLS: NO DIFFICULTY AT ALL
WALKING_15_MINUTES_OR_GREATER: MODERATE DIFFICULTY
HOS_ADL_COUNT: 17
GOING_DOWN_1_FLIGHT_OF_STAIRS: NO DIFFICULTY AT ALL

## 2020-10-01 ENCOUNTER — THERAPY VISIT (OUTPATIENT)
Dept: PHYSICAL THERAPY | Facility: CLINIC | Age: 75
End: 2020-10-01
Payer: COMMERCIAL

## 2020-10-01 DIAGNOSIS — M25.552 ARTHRALGIA OF LEFT HIP: Primary | ICD-10-CM

## 2020-10-01 PROCEDURE — 97112 NEUROMUSCULAR REEDUCATION: CPT | Mod: GP | Performed by: PHYSICAL THERAPIST

## 2020-10-01 PROCEDURE — 97110 THERAPEUTIC EXERCISES: CPT | Mod: GP | Performed by: PHYSICAL THERAPIST

## 2020-10-01 ASSESSMENT — ACTIVITIES OF DAILY LIVING (ADL)
LIGHT_TO_MODERATE_WORK: NO DIFFICULTY AT ALL
HEAVY_WORK: NO DIFFICULTY AT ALL
WALKING_DOWN_STEEP_HILLS: NO DIFFICULTY AT ALL
WALKING_15_MINUTES_OR_GREATER: NO DIFFICULTY AT ALL
TWISTING/PIVOTING_ON_INVOLVED_LEG: NO DIFFICULTY AT ALL
PUTTING_ON_SOCKS_AND_SHOES: SLIGHT DIFFICULTY
WALKING_INITIALLY: NO DIFFICULTY AT ALL
HOS_ADL_HIGHEST_POTENTIAL_SCORE: 68
HOS_ADL_COUNT: 17
STEPPING_UP_AND_DOWN_CURBS: NO DIFFICULTY AT ALL
STANDING_FOR_15_MINUTES: NO DIFFICULTY AT ALL
SITTING_FOR_15_MINUTES: NO DIFFICULTY AT ALL
DEEP_SQUATTING: SLIGHT DIFFICULTY
GETTING_INTO_AND_OUT_OF_AN_AVERAGE_CAR: NO DIFFICULTY AT ALL
HOW_WOULD_YOU_RATE_YOUR_CURRENT_LEVEL_OF_FUNCTION_DURING_YOUR_USUAL_ACTIVITIES_OF_DAILY_LIVING_FROM_0_TO_100_WITH_100_BEING_YOUR_LEVEL_OF_FUNCTION_PRIOR_TO_YOUR_HIP_PROBLEM_AND_0_BEING_THE_INABILITY_TO_PERFORM_ANY_OF_YOUR_USUAL_DAILY_ACTIVITIES?: 85
ROLLING_OVER_IN_BED: NO DIFFICULTY AT ALL
HOS_ADL_SCORE(%): 97.06
RECREATIONAL_ACTIVITIES: SLIGHT DIFFICULTY
GOING_DOWN_1_FLIGHT_OF_STAIRS: NO DIFFICULTY AT ALL
WALKING_UP_STEEP_HILLS: NO DIFFICULTY AT ALL
WALKING_APPROXIMATELY_10_MINUTES: NO DIFFICULTY AT ALL
GETTING_INTO_AND_OUT_OF_A_BATHTUB: NO DIFFICULTY AT ALL
GOING_UP_1_FLIGHT_OF_STAIRS: NO DIFFICULTY AT ALL
HOS_ADL_ITEM_SCORE_TOTAL: 66

## 2020-10-12 ENCOUNTER — VIRTUAL VISIT (OUTPATIENT)
Dept: FAMILY MEDICINE | Facility: CLINIC | Age: 75
End: 2020-10-12
Payer: COMMERCIAL

## 2020-10-12 DIAGNOSIS — E11.9 INSULIN-REQUIRING OR DEPENDENT TYPE II DIABETES MELLITUS (H): Primary | ICD-10-CM

## 2020-10-12 DIAGNOSIS — Z79.4 INSULIN-REQUIRING OR DEPENDENT TYPE II DIABETES MELLITUS (H): Primary | ICD-10-CM

## 2020-10-12 PROCEDURE — 99213 OFFICE O/P EST LOW 20 MIN: CPT | Mod: 95 | Performed by: INTERNAL MEDICINE

## 2020-10-12 NOTE — PROGRESS NOTES
"John Ayala is a 75 year old male who is being evaluated via a billable telephone visit.      The patient has been notified of following:     \"This telephone visit will be conducted via a call between you and your physician/provider. We have found that certain health care needs can be provided without the need for a physical exam.  This service lets us provide the care you need with a short phone conversation.  If a prescription is necessary we can send it directly to your pharmacy.  If lab work is needed we can place an order for that and you can then stop by our lab to have the test done at a later time.    Telephone visits are billed at different rates depending on your insurance coverage. During this emergency period, for some insurers they may be billed the same as an in-person visit.  Please reach out to your insurance provider with any questions.    If during the course of the call the physician/provider feels a telephone visit is not appropriate, you will not be charged for this service.\"    Patient has given verbal consent for Telephone visit?  Yes    What phone number would you like to be contacted at? [phone]    How would you like to obtain your AVS? Rubi     [facility] Internal Medicine Progress Note             Interval History:     Type 2 diabetes follow-up  How often are you checking your blood sugar? Two times daily  Blood sugar testing frequency justification:  Uncontrolled diabetes and Adjustment of medication(s)  What time of day are you checking your blood sugars (select all that apply)?  morning and evening.  Have you had any blood sugars above 200?  Yes  Have you had any blood sugars below 70?  No    What symptoms do you notice when your blood sugar is low?  Shaky    What concerns do you have today about your diabetes? None and Blood sugar is often over 200.     Do you have any of these symptoms? (Select all that apply)  No numbness or tingling in feet.  No redness, " sores or blisters on feet.  No complaints of excessive thirst.  No reports of blurry vision.  No significant changes to weight.    Have you had a diabetic eye exam in the last 12 months? No      Recent A1c: 9.2% last 8/26/2020     Aspirin: yes     Statins: Atorvastatin 40 mg.     Proteinuria: yes (224.51 mg/g Cr last 8/26/2020).     Hypertension: No     CAD: No     CHRONIC KIDNEY DISEASE: No     CVA: No     PAD: No     Neuropathy: No     Retinopathy: No    Other concerns: YES. Cost of Basaglar is approximately $365/month. Patient is requesting a different insulin (Novolin 70/30).    Patient does not take any oral medications due to low C-peptide test last 5/16/2017.                 Significant Problems:   Patient Active Problem List   Diagnosis     Obstructive sleep apnea     Insulin-requiring or dependent type II diabetes mellitus (H)     Overweight (BMI 25.0-29.9)     Calcified granuloma of lung (H)     Pulmonary nodules     Fatty liver disease, nonalcoholic     Hypertension goal BP (blood pressure) < 140/90     Hyperlipidemia LDL goal <70     Positive occult stool blood test     Arthralgia of left hip              Review of Systems:   CONSTITUTIONAL: NEGATIVE for fever, chills, change in weight  INTEGUMENTARY/SKIN: NEGATIVE for worrisome rashes, moles or lesions  EYES: NEGATIVE for vision changes or irritation  ENT/MOUTH: NEGATIVE for ear, mouth and throat problems  RESP: NEGATIVE for significant cough or SOB  CV: NEGATIVE for chest pain, palpitations or peripheral edema  GI: NEGATIVE for nausea, abdominal pain, heartburn, or change in bowel habits  : NEGATIVE for frequency, dysuria, or hematuria  MUSCULOSKELETAL:POSITIVE  for left hip pain that improved with physical therapy at [place], [place].  NEURO: NEGATIVE for weakness, dizziness or paresthesias  ENDOCRINE: NEGATIVE for temperature intolerance, skin/hair changes  HEME: NEGATIVE for bleeding problems  PSYCHIATRIC: NEGATIVE for changes in mood or affect                Physical Exam:   Vital signs not obtained due to nature of visit.    Remainder exam not performed due to nature of visit.          Data:     Ref Range & Units 3yr ago      C Peptide 0.9 - 6.9 ng/mL 1.6    Resulting [facility]         Specimen Collected: 05/16/17  2:54 PM Last Resulted: 05/18/17 11:54 AM          Ref Range & Units 1mo ago      Sodium 133 - 144 mmol/L 137     Potassium 3.4 - 5.3 mmol/L 4.4     Chloride 94 - 109 mmol/L 105     Carbon Dioxide 20 - 32 mmol/L 24     Anion Gap 3 - 14 mmol/L 8     Glucose 70 - 99 mg/dL 225High     Comment: Fasting specimen    Urea Nitrogen 7 - 30 mg/dL 16     Creatinine 0.66 - 1.25 mg/dL 0.84     GFR Estimate >60 mL/min/ 87    Comment: Non  GFR Calc   Starting 12/18/2018, serum creatinine based estimated GFR (eGFR) will be   calculated using the Chronic Kidney Disease Epidemiology Collaboration   (CKD-EPI) equation.     GFR Estimate If Black >60 mL/min/ >90    Comment:  GFR Calc   Starting 12/18/2018, serum creatinine based estimated GFR (eGFR) will be   calculated using the Chronic Kidney Disease Epidemiology Collaboration   (CKD-EPI) equation.     Calcium 8.5 - 10.1 mg/dL 9.4    Resulting [facility]         Specimen Collected: 08/26/20  1:39 PM Last Resulted: 08/27/20 11:10 AM        Ref Range & Units 1mo ago      Hemoglobin A1C 0 - 5.6 % 9.2High     Comment: Results confirmed by repeat test   Normal <5.7% Prediabetes 5.7-6.4%  Diabetes 6.5% or higher - adopted from ADA   consensus guidelines.    Resulting [facility]         Specimen Collected: 08/26/20  1:39 PM Last Resulted: 08/26/20  2:30 PM                  Assessment and Plan:   1. Insulin-requiring or dependent type II diabetes mellitus (H)  Due to cost issues, discontinue Basaglar.   Patient informed to first use Novolin 70/30 at a lower dose of 15 units BID since its  short-acting component may lead to hypoglycemia.  I reminded Mr. Ayala to adjust his insulin accordingly.  Due to risk of hypoglycemia, frequency of glucose monitoring increased to 4x/day.  He is due for labs by November 26, 2020.  - insulin NPH-Regular (NOVOLIN 70/30 FLEXPEN) susp; Inject 25 Units Subcutaneous 2 times daily (with meals)  Dispense: 15 mL; Refill: 5  - blood glucose (NO BRAND SPECIFIED) test strip; Use to test blood sugars four times daily.  Dispense: 120 strip; Refill: 11  - Hemoglobin A1c; Standing  - Glucose; Standing  - Creatinine; Standing  - Albumin Random Urine Quantitative with Creat Ratio; Standing       Disposition:  Follow-up in 3 months (labs and virtual visit).    Vineet Franco MD  Internal Medicine  [facility] Team        Phone call duration:  12 minutes  Start: 11:55 AM  End: 12:07 PM

## 2020-10-15 ENCOUNTER — TELEPHONE (OUTPATIENT)
Dept: FAMILY MEDICINE | Facility: CLINIC | Age: 75
End: 2020-10-15

## 2020-10-15 NOTE — TELEPHONE ENCOUNTER
Reason for Call:  Other Patient would like to give [facility] a copy of his flu shot records.     Detailed comments: Patient does not need anything in return, would just like [facility] to have, placed on 2nd floor [facility].     Phone Number Patient can be reached at: Cell number on file:    Telephone Information:   Mobile [phone]       Best Time: ANY    Can we leave a detailed message on this number? YES    Call taken on 10/15/2020 at 4:59 PM by Sally Hudson

## 2020-11-26 DIAGNOSIS — E11.9 INSULIN-REQUIRING OR DEPENDENT TYPE II DIABETES MELLITUS (H): ICD-10-CM

## 2020-11-26 DIAGNOSIS — Z79.4 INSULIN-REQUIRING OR DEPENDENT TYPE II DIABETES MELLITUS (H): ICD-10-CM

## 2020-11-26 LAB — HBA1C MFR BLD: 9 % (ref 0–5.6)

## 2020-11-26 PROCEDURE — 82565 ASSAY OF CREATININE: CPT | Performed by: INTERNAL MEDICINE

## 2020-11-26 PROCEDURE — 36415 COLL VENOUS BLD VENIPUNCTURE: CPT | Performed by: INTERNAL MEDICINE

## 2020-11-26 PROCEDURE — 82043 UR ALBUMIN QUANTITATIVE: CPT | Performed by: INTERNAL MEDICINE

## 2020-11-26 PROCEDURE — 82947 ASSAY GLUCOSE BLOOD QUANT: CPT | Performed by: INTERNAL MEDICINE

## 2020-11-26 PROCEDURE — 83036 HEMOGLOBIN GLYCOSYLATED A1C: CPT | Performed by: INTERNAL MEDICINE

## 2020-11-27 LAB
CREAT SERPL-MCNC: 0.88 MG/DL (ref 0.66–1.25)
CREAT UR-MCNC: 283 MG/DL
GFR SERPL CREATININE-BSD FRML MDRD: 85 ML/MIN/{1.73_M2}
GLUCOSE SERPL-MCNC: 136 MG/DL (ref 70–99)
MICROALBUMIN UR-MCNC: 698 MG/L
MICROALBUMIN/CREAT UR: 246.64 MG/G CR (ref 0–17)

## 2020-12-01 ENCOUNTER — VIRTUAL VISIT (OUTPATIENT)
Dept: FAMILY MEDICINE | Facility: CLINIC | Age: 75
End: 2020-12-01
Payer: COMMERCIAL

## 2020-12-01 DIAGNOSIS — Z79.4 INSULIN-REQUIRING OR DEPENDENT TYPE II DIABETES MELLITUS (H): Primary | ICD-10-CM

## 2020-12-01 DIAGNOSIS — J98.4 CALCIFIED GRANULOMA OF LUNG: ICD-10-CM

## 2020-12-01 DIAGNOSIS — E11.9 INSULIN-REQUIRING OR DEPENDENT TYPE II DIABETES MELLITUS (H): Primary | ICD-10-CM

## 2020-12-01 PROCEDURE — 99214 OFFICE O/P EST MOD 30 MIN: CPT | Mod: 95 | Performed by: INTERNAL MEDICINE

## 2020-12-01 RX ORDER — PROCHLORPERAZINE 25 MG/1
SUPPOSITORY RECTAL
COMMUNITY

## 2020-12-01 NOTE — PROGRESS NOTES
"John Ayala is a 75 year old male who is being evaluated via a billable telephone visit.      The patient has been notified of following:     \"This telephone visit will be conducted via a call between you and your physician/provider. We have found that certain health care needs can be provided without the need for a physical exam.  This service lets us provide the care you need with a short phone conversation.  If a prescription is necessary we can send it directly to your pharmacy.  If lab work is needed we can place an order for that and you can then stop by our lab to have the test done at a later time.    Telephone visits are billed at different rates depending on your insurance coverage. During this emergency period, for some insurers they may be billed the same as an in-person visit.  Please reach out to your insurance provider with any questions.    If during the course of the call the physician/provider feels a telephone visit is not appropriate, you will not be charged for this service.\"    Patient has given verbal consent for Telephone visit?  Yes    What phone number would you like to be contacted at? [phone]    How would you like to obtain your AVS? Rubi      [facility] Internal Medicine Progress Note           Assessment and Plan:   1. Insulin-requiring or dependent type II diabetes mellitus (H)  Hemoglobin A1c last 11/26/2020 is worse. It coincided with reduced exercise due to gym closures (secondary to COVID-19 pandemic).   Patient reassured about his combined intermediate/short-acting insulin. He was advised to start 10 units BID, then increase by one units at a time until subsequent pre-meal glucose is less than 130.    2. Calcified granuloma of lung (H)  Patient is overdue for a low-dose CT of chest, but he prefers to skip his upcoming test next year.             Interval History:   John Ayala is a 75 year old male who presents via phone visit today for the " following health issues:     Diabetes follow-up  How often are you checking your blood sugar? Four or more times daily  Blood sugar testing frequency justification:  Adjustment of medication(s)  What time of day are you checking your blood sugars (select all that apply)?  Before and after meals, At bedtime and early evening  Have you had any blood sugars above 200?  Yes morning  Have you had any blood sugars below 70?  No    What symptoms do you notice when your blood sugar is low?  Shaky, Dizzy, Weak and Confusion    What concerns do you have today about your diabetes? Other: A1c is too high, medication is not helping per patient, Insulin works per patient.      Do you have any of these symptoms? (Select all that apply)  No numbness or tingling in feet.  No redness, sores or blisters on feet.  No complaints of excessive thirst.  No reports of blurry vision.  No significant changes to weight.    Have you had a diabetic eye exam in the last 12 months? Yes-  Location: March 2020     I have 4 questions for you. I received contradictory letters, one from [employer] about lack of coverage for [employer]. Next questions is, I've been holding off on the prescribed dose of Novolin 70/30 25 units twice a days due to concerns about hypoglycemia. I've been taking AM dose 25 units every AM and 12 units every AM. Is there a waiting list for COVID19 vaccine? When I switched over to [employer], I will be covered for Medicare Part D. Insulin will be covered, but Dexcom6 is not affordable, but it will be picked up by health insurance by January 1, 2021. I am still interested in continuous glucose monitoring (CGM).        BP Readings from Last 2 Encounters:   09/03/20 120/74   01/21/20 118/72     Hemoglobin A1C (%)   Date Value   11/26/2020 9.0 (H)   08/26/2020 9.2 (H)     LDL Cholesterol Calculated (mg/dL)   Date Value   08/26/2020 56   07/09/2019 141 (H)                          Significant Problems:   Patient Active  Problem List   Diagnosis     Obstructive sleep apnea     Insulin-requiring or dependent type II diabetes mellitus (H)     Overweight (BMI 25.0-29.9)     Calcified granuloma of lung (H)     Pulmonary nodules     Fatty liver disease, nonalcoholic     Hypertension goal BP (blood pressure) < 140/90     Hyperlipidemia LDL goal <70     Positive occult stool blood test     Arthralgia of left hip              Review of Systems:   CONSTITUTIONAL: NEGATIVE for fever, chills, change in weight  INTEGUMENTARY/SKIN: NEGATIVE for worrisome rashes, moles or lesions  EYES: NEGATIVE for vision changes or irritation  ENT/MOUTH: NEGATIVE for ear, mouth and throat problems  RESP:POSITIVE for history of lung granuloma.  CV: NEGATIVE for chest pain, palpitations or peripheral edema  GI: NEGATIVE for nausea, abdominal pain, heartburn, or change in bowel habits  : NEGATIVE for frequency, dysuria, or hematuria  MUSCULOSKELETAL: NEGATIVE for significant arthralgias or myalgia  NEURO: NEGATIVE for weakness, dizziness or paresthesias  ENDOCRINE: NEGATIVE for temperature intolerance, skin/hair changes  HEME: NEGATIVE for bleeding problems  PSYCHIATRIC: NEGATIVE for changes in mood or affect            Medications:     Current Outpatient Medications   Medication Sig     ACE/ARB NOT PRESCRIBED, INTENTIONAL, Please choose reason not prescribed, below     aspirin 81 MG tablet Take by mouth daily     atorvastatin (LIPITOR) 40 MG tablet TAKE 1 TABLET BY MOUTH ONCE DAILY WITH  DINNER     blood glucose (NO BRAND SPECIFIED) lancets standard Use to test blood sugar two times daily or as directed.     blood glucose (NO BRAND SPECIFIED) test strip Use to test blood sugars four times daily.     Coenzyme Q-10 60 MG CAPS Take 1 capsule by mouth daily     Continuous Blood Gluc  (DEXCOM G6 ) ESTELLA Use to read blood sugars as per 's instructions.     Continuous Blood Gluc Sensor (DEXCOM G6 SENSOR) MISC Change every 10 days.      Continuous Blood Gluc Transmit (DEXCOM G6 TRANSMITTER) MISC Change every 3 months.     insulin NPH-Regular (NOVOLIN 70/30 FLEXPEN) susp Inject 25 Units Subcutaneous 2 times daily (with meals)     insulin pen needle 31G X 6 MM Use once daily with Lantus.     metFORMIN (GLUCOPHAGE) 1000 MG tablet Take 1 tablet (1,000 mg) by mouth 2 times daily (with meals)     Multiple Vitamin (MULTIVITAMINS PO) Take 1 tablet by mouth daily     UNABLE TO FIND MEDICATION NAME: diotrust, ageless body capsule     UNABLE TO FIND MEDICATION NAME: Arthrozine -     UNABLE TO FIND MEDICATION NAME: bio - ?  Immune system, - tosin root, organic oregano leave, organic powder, amla extract.     No current facility-administered medications for this visit.              Physical Exam:   Vital signs not obtained due to nature of visit.    Remainder of exam not performed due to nature of visit.          Data:   All laboratory data reviewed  All cardiac studies reviewed by me.  All imaging studies reviewed by me.     Disposition:  Follow-up in 6 months.    Vineet Franco MD  Internal Medicine  [facility] Team    Phone call duration: 21 minutes.  Start: 2:00 PM  End: 2:21 PM

## 2020-12-10 NOTE — PROGRESS NOTES
Patient did not return for further treatment and no additional progress was noted.  Please refer to the progress note and goal flowsheet completed on 10/01/20 for discharge information.

## 2020-12-14 ENCOUNTER — HEALTH MAINTENANCE LETTER (OUTPATIENT)
Age: 75
End: 2020-12-14

## 2021-06-12 ENCOUNTER — HEALTH MAINTENANCE LETTER (OUTPATIENT)
Age: 76
End: 2021-06-12

## 2021-10-02 ENCOUNTER — HEALTH MAINTENANCE LETTER (OUTPATIENT)
Age: 76
End: 2021-10-02

## 2022-01-22 ENCOUNTER — HEALTH MAINTENANCE LETTER (OUTPATIENT)
Age: 77
End: 2022-01-22

## 2022-07-22 NOTE — TELEPHONE ENCOUNTER
Patient calling back with the pharmacy which is, [id number] - [place], [place] - [address]   No

## 2022-09-04 ENCOUNTER — HEALTH MAINTENANCE LETTER (OUTPATIENT)
Age: 77
End: 2022-09-04

## 2023-04-29 ENCOUNTER — HEALTH MAINTENANCE LETTER (OUTPATIENT)
Age: 78
End: 2023-04-29

## 2023-09-30 ENCOUNTER — HEALTH MAINTENANCE LETTER (OUTPATIENT)
Age: 78
End: 2023-09-30

## 2024-04-27 ENCOUNTER — HEALTH MAINTENANCE LETTER (OUTPATIENT)
Age: 79
End: 2024-04-27